# Patient Record
Sex: FEMALE | Race: WHITE | Employment: OTHER | URBAN - METROPOLITAN AREA
[De-identification: names, ages, dates, MRNs, and addresses within clinical notes are randomized per-mention and may not be internally consistent; named-entity substitution may affect disease eponyms.]

---

## 2017-06-19 ENCOUNTER — OFFICE VISIT (OUTPATIENT)
Dept: SURGERY | Age: 77
End: 2017-06-19

## 2017-06-19 VITALS
OXYGEN SATURATION: 97 % | HEIGHT: 61 IN | TEMPERATURE: 97.9 F | WEIGHT: 143.6 LBS | HEART RATE: 64 BPM | SYSTOLIC BLOOD PRESSURE: 145 MMHG | RESPIRATION RATE: 18 BRPM | BODY MASS INDEX: 27.11 KG/M2 | DIASTOLIC BLOOD PRESSURE: 96 MMHG

## 2017-06-19 DIAGNOSIS — K21.9 HIATAL HERNIA WITH GERD: Primary | ICD-10-CM

## 2017-06-19 DIAGNOSIS — K44.9 HIATAL HERNIA WITH GERD: Primary | ICD-10-CM

## 2017-06-19 NOTE — PROGRESS NOTES
Chief Complaint   Patient presents with    Abdominal Pain     hiatal hernia     Pt has hiatal hernia states that it has not given her any issues does have acid reflux. States her PCP wanted her to have it checked out. Pt states that she does experience some abd pain with meals.

## 2017-06-19 NOTE — MR AVS SNAPSHOT
Visit Information Date & Time Provider Department Dept. Phone Encounter #  
 6/19/2017  1:40 PM Vito Stephenson MD [facility] [id number] Upcoming Health Maintenance Date Due DTaP/Tdap/Td series (1 - Tdap) 7/26/1960 ZOSTER VACCINE AGE 60> 7/26/1999 GLAUCOMA SCREENING Q2Y 7/26/2004 OSTEOPOROSIS SCREENING (DEXA) 7/26/2004 Pneumococcal 65+ Low/Medium Risk (1 of 2 - PCV13) 7/26/2004 MEDICARE YEARLY EXAM 7/26/2004 INFLUENZA AGE 9 TO ADULT 8/1/2017 Allergies as of 6/19/2017  Review Complete On: 6/19/2017 By: Yancy Larry LPN No Known Allergies Current Immunizations  Never Reviewed No immunizations on file. Not reviewed this visit Vitals BP Pulse Temp Resp Height(growth percentile) Weight(growth percentile) (!) 145/96 64 97.9 °F (36.6 °C) (Oral) 18 5' 1\" (1.549 m) 143 lb 9.6 oz (65.1 kg) SpO2 BMI OB Status 97% 27.13 kg/m2 Postmenopausal     
  
BMI and BSA Data Body Mass Index Body Surface Area  
 27.13 kg/m 2 1.67 m 2 Preferred Pharmacy Pharmacy Name Phone [address], [address] [phone] Your Updated Medication List  
  
   
This list is accurate as of: 6/19/17  5:04 PM.  Always use your most recent med list.  
  
  
  
  
 aspirin 81 mg tablet Take  by mouth daily. atenolol-chlorthalidone 50-25 mg per tablet Commonly known as:  Olive Tom Take 1 Tab by mouth daily. benzocaine 20 % Kit  
by Mucous Membrane route. BIOTIN  
by Does Not Apply route daily. CITRACAL PO Take  by mouth two (2) times a day. EVISTA 60 mg tablet Generic drug:  raloxifene Take  by mouth daily. FIBERCON PO Take  by mouth two (2) times a day. FLUCONAZOLE (BULK)  
by Does Not Apply route. KLOR-CON M20 20 mEq tablet Generic drug:  potassium chloride Take 20 mEq by mouth two (2) times a day. LODRANE PO Take  by mouth daily. Indications: 2 pills once a day MAGTRATE PO Take  by mouth daily. metFORMIN 500 mg tablet Commonly known as:  GLUCOPHAGE Take 500 mg by mouth daily (with breakfast). NexIUM 40 mg capsule Generic drug:  esomeprazole Take 40 mg by mouth two (2) times a day. OTHER  
daily. Indications: fluticasone nasal spray OTHER(NON-FORMULARY)  
two (2) times a day. Indications: benzonotate VITAMIN D2 PO Take  by mouth two (2) times a day. Introducing [facility]! [facility] introduces [employer] patient portal. Now you can access parts of your medical record, email your doctor's office, and request medication refills online. 1. In your internet browser, go to [email or web address] 2. Click on the First Time User? Click Here link in the Sign In box. You will see the New Member Sign Up page. 3. Enter your [employer] Access Code exactly as it appears below. You will not need to use this code after youve completed the sign-up process. If you do not sign up before the expiration date, you must request a new code. · [employer] Access Code: [email or web address] Expires: 9/17/2017  1:48 PM 
 
4. Enter the last four digits of your Social Security Number (xxxx) and Date of Birth (mm/dd/yyyy) as indicated and click Submit. You will be taken to the next sign-up page. 5. Create a [employer] ID. This will be your [employer] login ID and cannot be changed, so think of one that is secure and easy to remember. 6. Create a [employer] password. You can change your password at any time. 7. Enter your Password Reset Question and Answer. This can be used at a later time if you forget your password. 8. Enter your e-mail address. You will receive e-mail notification when new information is available in [address] 9. Click Sign Up. You can now view and download portions of your medical record. 10. Click the Download Summary menu link to download a portable copy of your medical information. If you have questions, please visit the Frequently Asked Questions section of the [employer] website. Remember, [employer] is NOT to be used for urgent needs. For medical emergencies, dial 911. Now available from your iPhone and Android! Please provide this summary of care documentation to your next provider. Your primary care clinician is listed as Venancio Reynolds. If you have any questions after today's visit, please call [phone].

## 2017-06-20 NOTE — PROGRESS NOTES
Surgery Consult:  Hiatal hernia  Requesting physician:  Dr. Isabella Ozuna    Subjective:   Patient 67 y.o.  female presents for surgical evaluation of hiatal hernia. Patient has been having intermittent post-prandial bloating and epigastric pain with dyspepsia. Patient also complains of intermittent dyspnea with activity and sometimes post-prandial.  Patient also reports occasional solid dysphagia. No nausea or vomiting. No change in bowel habits. No diarrhea or constipation. No chronic cough. No history of aspiration pneumonia or hoarseness. Patient with chronic history of GERD and has been on Nexium for 20 years. Symptoms are well controlled with Nexium. Patient had UGI on 7/31/13 and it showed large sliding type hiatal hernia with proximal half of the stomach inverted above the diaphragm; no reflux. Last EGD was on 6/18/13 and patient noted to have large hiatal hernia with erosions Dusty Sandhoff lesion) and esophageal stricture which was dilated. Esophageal manometry has not been done yet. Past Medical & Surgical History:  Past Medical History:   Diagnosis Date    Anemia 6/18/2013    Cancer ([address]     CLL    Epigastric pain 6/18/2013    Family history of colon cancer 6/18/2013    sister    GERD (gastroesophageal reflux disease)     Hypertension       Past Surgical History:   Procedure Laterality Date    HX CHOLECYSTECTOMY      HX KNEE ARTHROSCOPY      LEFT    HX VASCULAR ACCESS      SERGE CATH LL CHEST       Social History:  Social History     Social History    Marital status:      Spouse name: N/A    Number of children: N/A    Years of education: N/A     Occupational History    Not on file.      Social History Main Topics    Smoking status: Never Smoker    Smokeless tobacco: Not on file    Alcohol use No    Drug use: No    Sexual activity: Not on file     Other Topics Concern    Not on file     Social History Narrative        Family History:  Family History   Problem Relation Age of Onset    Heart Disease Mother     Arthritis-rheumatoid Mother         Medications:  Current Outpatient Prescriptions   Medication Sig    metFORMIN (GLUCOPHAGE) 500 mg tablet Take 500 mg by mouth daily (with breakfast).  potassium chloride (KLOR-CON M20) 20 mEq tablet Take 20 mEq by mouth two (2) times a day.  atenolol-chlorthalidone (TENORETIC) 50-25 mg per tablet Take 1 Tab by mouth daily.  esomeprazole (NEXIUM) 40 mg capsule Take 40 mg by mouth two (2) times a day.  CALCIUM POLYCARBOPHIL (FIBERCON PO) Take  by mouth two (2) times a day.  CALCIUM CITRATE (CITRACAL PO) Take  by mouth two (2) times a day.  ERGOCALCIFEROL (VITAMIN D PO) Take  by mouth two (2) times a day.  benzocaine 20 % Kit by Mucous Membrane route.  OTHER,NON-FORMULARY, two (2) times a day. Indications: benzonotate    FLUCONAZOLE, BULK, by Does Not Apply route.  OTHER daily. Indications: fluticasone nasal spray    MAGNESIUM GLUCONATE (MAGTRATE PO) Take  by mouth daily.  P-EPHED HCL/BROMPHENIRAMIN (LODRANE PO) Take  by mouth daily. Indications: 2 pills once a day    aspirin 81 mg tablet Take  by mouth daily.  raloxifene (EVISTA) 60 mg tablet Take  by mouth daily.  BIOTIN by Does Not Apply route daily. No current facility-administered medications for this visit. Allergies:  No Known Allergies    Review of Systems  A comprehensive review of systems was negative except for that written in the HPI. Objective:     Exam:    Visit Vitals    BP (!) 145/96    Pulse 64    Temp 97.9 °F (36.6 °C) (Oral)    Resp 18    Ht 5' 1\" (1.549 m)    Wt 65.1 kg (143 lb 9.6 oz)    SpO2 97%    BMI 27.13 kg/m2     General appearance: alert, cooperative, no distress, appears stated age  Eyes: negative  Lungs: clear to auscultation bilaterally  Heart: regular rate and rhythm  Abdomen: soft, non-distended. Mild epigastric tenderness. No rebound or guarding.   Extremities: extremities normal, atraumatic, no cyanosis or edema. CATINA. Skin: Skin color, texture, turgor normal. No rashes or lesions. Neurologic: Grossly normal      Assessment:     Symptomatic hiatal hernia  Hx of Baljinder ulcer  GERD    Plan:     Recommend Davinci hiatal hernia repair with fundoplication   Risks, benefit, and alternative to surgery was discussed with the patient. The risks of surgery include but not limited to infection, bleeding, intraabdominal organ injury, vague nerve injury, recurrence, dysphagia following surgery, possible conversion to open, and the risks of general anesthetic. At this time, patient would like to hold off on surgery. All questions answered. Instructed to continue PPI for now. If patient decides to go ahead with surgery at later time, will need esophageal manometry.

## 2018-07-21 ENCOUNTER — HOSPITAL ENCOUNTER (EMERGENCY)
Age: 78
Discharge: HOME | End: 2018-07-21
Attending: EMERGENCY MEDICINE
Payer: MEDICARE

## 2018-07-21 ENCOUNTER — APPOINTMENT (OUTPATIENT)
Dept: CT IMAGING | Age: 78
End: 2018-07-21
Attending: EMERGENCY MEDICINE
Payer: MEDICARE

## 2018-07-21 VITALS
RESPIRATION RATE: 16 BRPM | WEIGHT: 138.45 LBS | HEART RATE: 53 BPM | OXYGEN SATURATION: 95 % | SYSTOLIC BLOOD PRESSURE: 202 MMHG | TEMPERATURE: 98.2 F | HEIGHT: 61 IN | DIASTOLIC BLOOD PRESSURE: 85 MMHG | BODY MASS INDEX: 26.14 KG/M2

## 2018-07-21 DIAGNOSIS — R10.13 ABDOMINAL PAIN, EPIGASTRIC: Primary | ICD-10-CM

## 2018-07-21 DIAGNOSIS — R10.13 EPIGASTRIC PAIN: ICD-10-CM

## 2018-07-21 DIAGNOSIS — K44.9 HIATAL HERNIA: ICD-10-CM

## 2018-07-21 PROCEDURE — 96375 TX/PRO/DX INJ NEW DRUG ADDON: CPT

## 2018-07-21 PROCEDURE — 96374 THER/PROPH/DIAG INJ IV PUSH: CPT

## 2018-07-21 PROCEDURE — 99284 EMERGENCY DEPT VISIT MOD MDM: CPT

## 2018-07-21 PROCEDURE — 74011636320 HC RX REV CODE- 636/320: Performed by: EMERGENCY MEDICINE

## 2018-07-21 PROCEDURE — 74011250636 HC RX REV CODE- 250/636: Performed by: EMERGENCY MEDICINE

## 2018-07-21 PROCEDURE — 74177 CT ABD & PELVIS W/CONTRAST: CPT

## 2018-07-21 RX ORDER — SODIUM CHLORIDE 9 MG/ML
50 INJECTION, SOLUTION INTRAVENOUS
Status: COMPLETED | OUTPATIENT
Start: 2018-07-21 | End: 2018-07-21

## 2018-07-21 RX ORDER — ONDANSETRON 2 MG/ML
4 INJECTION INTRAMUSCULAR; INTRAVENOUS
Status: COMPLETED | OUTPATIENT
Start: 2018-07-21 | End: 2018-07-21

## 2018-07-21 RX ORDER — MORPHINE SULFATE 4 MG/ML
4 INJECTION INTRAVENOUS
Status: COMPLETED | OUTPATIENT
Start: 2018-07-21 | End: 2018-07-21

## 2018-07-21 RX ORDER — METRONIDAZOLE 500 MG/1
500 TABLET ORAL 3 TIMES DAILY
Qty: 30 TAB | Refills: 0 | Status: ON HOLD | OUTPATIENT
Start: 2018-07-21 | End: 2018-07-26

## 2018-07-21 RX ORDER — SODIUM CHLORIDE 0.9 % (FLUSH) 0.9 %
10 SYRINGE (ML) INJECTION
Status: COMPLETED | OUTPATIENT
Start: 2018-07-21 | End: 2018-07-21

## 2018-07-21 RX ORDER — CIPROFLOXACIN 500 MG/1
500 TABLET ORAL 2 TIMES DAILY
Qty: 20 TAB | Refills: 0 | Status: ON HOLD | OUTPATIENT
Start: 2018-07-21 | End: 2018-07-26

## 2018-07-21 RX ORDER — HYDROCODONE BITARTRATE AND ACETAMINOPHEN 7.5; 325 MG/1; MG/1
1 TABLET ORAL
Qty: 10 TAB | Refills: 0 | Status: ON HOLD | OUTPATIENT
Start: 2018-07-21 | End: 2018-07-26

## 2018-07-21 RX ADMIN — ONDANSETRON 4 MG: 2 INJECTION INTRAMUSCULAR; INTRAVENOUS at 12:35

## 2018-07-21 RX ADMIN — Medication 10 ML: at 10:59

## 2018-07-21 RX ADMIN — IOPAMIDOL 100 ML: 755 INJECTION, SOLUTION INTRAVENOUS at 10:59

## 2018-07-21 RX ADMIN — SODIUM CHLORIDE 50 ML/HR: 900 INJECTION, SOLUTION INTRAVENOUS at 10:59

## 2018-07-21 RX ADMIN — MORPHINE SULFATE 4 MG: 4 INJECTION INTRAVENOUS at 12:36

## 2018-07-21 NOTE — ED NOTES
Bedside shift change report given to this RN (oncoming nurse) by Rosalio Schumacher RN (offgoing nurse). Report included the following information SBAR and ED Summary.

## 2018-07-21 NOTE — DISCHARGE INSTRUCTIONS
Hiatal Hernia: Care Instructions  Your Care Instructions  A hiatal hernia occurs when part of the stomach bulges into the chest cavity. A hiatal hernia may allow stomach acid and juices to back up into the esophagus (acid reflux). This can cause a feeling of burning, warmth, heat, or pain behind the breastbone. This feeling may often occur after you eat, soon after you lie down, or when you bend forward, and it may come and go. You also may have a sour taste in your mouth. These symptoms are commonly known as heartburn or reflux. But not all hiatal hernias cause symptoms. Follow-up care is a key part of your treatment and safety. Be sure to make and go to all appointments, and call your doctor if you are having problems. It's also a good idea to know your test results and keep a list of the medicines you take. How can you care for yourself at home? · Take your medicines exactly as prescribed. Call your doctor if you think you are having a problem with your medicine. · Do not take aspirin or other nonsteroidal anti-inflammatory drugs (NSAIDs), such as ibuprofen (Advil, Motrin) or naproxen (Aleve), unless your doctor says it is okay. Ask your doctor what you can take for pain. · Your doctor may recommend over-the-counter medicine. For mild or occasional indigestion, antacids such as Tums, Gaviscon, Maalox, or Mylanta may help. Your doctor also may recommend over-the-counter acid reducers, such as famotidine (Pepcid AC), cimetidine (Tagamet HB), ranitidine (Zantac 75 and Zantac 150), or omeprazole (Prilosec). Read and follow all instructions on the label. If you use these medicines often, talk with your doctor. · Change your eating habits. ¨ It's best to eat several small meals instead of two or three large meals. ¨ After you eat, wait 2 to 3 hours before you lie down. Late-night snacks aren't a good idea. ¨ Chocolate, mint, and alcohol can make heartburn worse.  They relax the valve between the esophagus and the stomach. ¨ Spicy foods, foods that have a lot of acid (like tomatoes and oranges), and coffee can make heartburn symptoms worse in some people. If your symptoms are worse after you eat a certain food, you may want to stop eating that food to see if your symptoms get better. · Do not smoke or chew tobacco.  · If you get heartburn at night, raise the head of your bed 6 to 8 inches by putting the frame on blocks or placing a foam wedge under the head of your mattress. (Adding extra pillows does not work.)  · Do not wear tight clothing around your middle. · Lose weight if you need to. Losing just 5 to 10 pounds can help. When should you call for help? Call your doctor now or seek immediate medical care if:    · You have new or worse belly pain.     · You are vomiting.    Watch closely for changes in your health, and be sure to contact your doctor if:    · You have new or worse symptoms of indigestion.     · You have trouble or pain swallowing.     · You are losing weight.     · You do not get better as expected. Where can you learn more? Go to http://kiarra-wendy.info/. Enter Z090 in the search box to learn more about \"Hiatal Hernia: Care Instructions. \"  Current as of: May 12, 2017  Content Version: 11.7  © 2390-7306 [employer]. Care instructions adapted under license by [employer] (which disclaims liability or warranty for this information). If you have questions about a medical condition or this instruction, always ask your healthcare professional. [place] [address] any warranty or liability for your use of this information.

## 2018-07-21 NOTE — ED NOTES
Dr. Katelynn Mi has reviewed discharge instructions with the patient. The patient verbalized understanding. Pt discharged home via wheelchair with family, discharge papers/prescriptions in hand.

## 2018-07-21 NOTE — CONSULTS
Surgery        Asked to see patient regarding epigastric pain and a CT scan showing: \"IMPRESSION:     1. Extremely large hiatal hernia, containing the entire fundus and part of the  gastric body. The size of this hernia has increased since the prior study in  2009. This degree of gastric herniation introduces risk of gastric volvulus. Correlate with clinical symptoms. 2. Mild hepatic steatosis. 3. Severe diverticulosis without diverticulitis confirmed, although there is  minimal fluid around the sigmoid colon. Correlate with clinical symptoms and  follow-up as appropriate. \"    WBC 9.6    VSS in fact patient is hypertensive with Sys BP greater than 200    Reports epigastric pain radiating around abd and into chest.  Has now been medicated for this    PE A&Ox3 NAD        Nl resp effort        RRR hypertensive        Abd soft,+BS, tender upper abdomen, no guarding or rebound    A/P no acute process of etiology for complaints identified. ? Related to large hiatal hernia which have corrected  Suggest F/U with Surgery: Dr Kimberley Peterson or Lula Kerr for [facility] hernia evaluation and possible robotic assisted repair. On Nexium    Diverticulosis, ?low grade diverticulitis  May be reasonable to treat with antibx    HTN. (has not taken meds today.)    Pain control and PO challenge before discharge. John Palacios MD, [facility]

## 2018-07-21 NOTE — ED PROVIDER NOTES
EMERGENCY DEPARTMENT HISTORY AND PHYSICAL EXAM 
 
 
Date: 7/21/2018 Patient Name: Kevin Dwyer History of Presenting Illness Chief Complaint Patient presents with  Abdominal Pain Patient states that she was referred from [facility] for a possible SBO History Provided By: Patient HPI: Kevin Dwyer, 65 y.o. female with PMHx significant for hiatal hernia, HTN, GERD, anemia, and s/p cholecystectomy presents ambulatory to the ED with cc of constant mid to upper abdominal pain that is now radiating to her back since 5 AM today that she rates a 7/10. Pt also reports some mild associated nausea. Her last BM was yesterday. Pt was seen at [facility] and referred to the ED for an XR due to her hiatal hernia. There are no other complaints, changes, or physical findings at this time. PCP: Keyana Cleveland MD 
 
Current Outpatient Prescriptions Medication Sig Dispense Refill  
 HYDROcodone-acetaminophen (LORTAB 7.5-325) 7.5-325 mg per tablet Take 1 Tab by mouth every eight (8) hours as needed for Pain. Max Daily Amount: 3 Tabs. Indications: Pain 10 Tab 0  
 metFORMIN (GLUCOPHAGE) 500 mg tablet Take 500 mg by mouth daily (with breakfast).  potassium chloride (KLOR-CON M20) 20 mEq tablet Take 20 mEq by mouth two (2) times a day.  atenolol-chlorthalidone (TENORETIC) 50-25 mg per tablet Take 1 Tab by mouth daily.  esomeprazole (NEXIUM) 40 mg capsule Take 40 mg by mouth two (2) times a day.  CALCIUM POLYCARBOPHIL (FIBERCON PO) Take  by mouth two (2) times a day.  CALCIUM CITRATE (CITRACAL PO) Take  by mouth two (2) times a day.  ERGOCALCIFEROL (VITAMIN D PO) Take  by mouth two (2) times a day.  benzocaine 20 % Kit by Mucous Membrane route.  OTHER,NON-FORMULARY, two (2) times a day. Indications: benzonotate  FLUCONAZOLE, BULK, by Does Not Apply route.  OTHER daily. Indications: fluticasone nasal spray  MAGNESIUM GLUCONATE (MAGTRATE PO) Take  by mouth daily.  P-EPHED HCL/BROMPHENIRAMIN (LODRANE PO) Take  by mouth daily. Indications: 2 pills once a day  aspirin 81 mg tablet Take  by mouth daily.  raloxifene (EVISTA) 60 mg tablet Take  by mouth daily.  BIOTIN by Does Not Apply route daily. Past History Past Medical History: 
Past Medical History:  
Diagnosis Date  Anemia 6/18/2013  Cancer ([address] CLL  Epigastric pain 6/18/2013  Family history of colon cancer 6/18/2013  
 sister  GERD (gastroesophageal reflux disease)  Hypertension Past Surgical History: 
Past Surgical History:  
Procedure Laterality Date  HX CHOLECYSTECTOMY  HX KNEE ARTHROSCOPY    
 LEFT  
 HX VASCULAR ACCESS SERGE CATH LL CHEST Family History: 
Family History Problem Relation Age of Onset  Heart Disease Mother  Arthritis-rheumatoid Mother Social History: 
Social History Substance Use Topics  Smoking status: Never Smoker  Smokeless tobacco: None  Alcohol use No  
 
 
Allergies: 
No Known Allergies Review of Systems Review of Systems Constitutional: Negative. Negative for activity change, appetite change, chills, fatigue, fever and unexpected weight change. HENT: Negative. Negative for congestion, hearing loss, rhinorrhea, sneezing and voice change. Eyes: Negative. Negative for pain and visual disturbance. Respiratory: Negative. Negative for apnea, cough, choking, chest tightness and shortness of breath. Cardiovascular: Negative. Negative for chest pain and palpitations. Gastrointestinal: Positive for abdominal pain and nausea. Negative for abdominal distention, blood in stool, diarrhea and vomiting. Genitourinary: Negative. Negative for difficulty urinating, flank pain, frequency and urgency. No discharge Musculoskeletal: Negative. Negative for arthralgias, back pain, myalgias and neck stiffness. Skin: Negative.   Negative for color change and rash. Neurological: Negative. Negative for dizziness, seizures, syncope, speech difficulty, weakness, numbness and headaches. Hematological: Negative for adenopathy. Psychiatric/Behavioral: Negative. Negative for agitation, behavioral problems, dysphoric mood and suicidal ideas. The patient is not nervous/anxious. Physical Exam  
Physical Exam  
Constitutional: She is oriented to person, place, and time. She appears well-developed and well-nourished. No distress. HENT:  
Head: Normocephalic and atraumatic. Mouth/Throat: Oropharynx is clear and moist. No oropharyngeal exudate. Eyes: Conjunctivae and EOM are normal. Pupils are equal, round, and reactive to light. Right eye exhibits no discharge. Left eye exhibits no discharge. Neck: Normal range of motion. Neck supple. Cardiovascular: Normal rate, regular rhythm and intact distal pulses. Exam reveals no gallop and no friction rub. No murmur heard. Pulmonary/Chest: Effort normal and breath sounds normal. No respiratory distress. She has no wheezes. She has no rales. She exhibits no tenderness. Abdominal: Soft. Bowel sounds are normal. She exhibits no distension and no mass. There is tenderness (mild) in the epigastric area. There is no rebound and no guarding. Musculoskeletal: Normal range of motion. She exhibits no edema. Lymphadenopathy:  
  She has no cervical adenopathy. Neurological: She is alert and oriented to person, place, and time. No cranial nerve deficit. Coordination normal.  
Skin: Skin is warm and dry. No rash noted. No erythema. Psychiatric: She has a normal mood and affect. Nursing note and vitals reviewed. Diagnostic Study Results Radiologic Studies -  
 
CT Results  (Last 48 hours) 07/21/18 1117  CT ABD PELV W CONT Final result Impression:  IMPRESSION:  
   
1. Extremely large hiatal hernia, containing the entire fundus and part of the  
gastric body.  The size of this hernia has increased since the prior study in  
2009. This degree of gastric herniation introduces risk of gastric volvulus. Correlate with clinical symptoms. 2. Mild hepatic steatosis. 3. Severe diverticulosis without diverticulitis confirmed, although there is  
minimal fluid around the sigmoid colon. Correlate with clinical symptoms and  
follow-up as appropriate. 4. Other incidental and postoperative changes. Narrative:  EXAM:  CT ABD PELV W CONT INDICATION: Abdominal pain  , extending across the upper abdomen and radiating  
to the back. History of cholecystectomy in the past. History of chronic  
lymphocytic leukemia. COMPARISON: 8/21/2009. CONTRAST:  100 mL of Isovue-370. TECHNIQUE:   
Following the uneventful intravenous administration of contrast, thin axial  
images were obtained through the abdomen and pelvis. Coronal and sagittal  
reconstructions were generated. Oral contrast was not administered. CT dose  
reduction was achieved through use of a standardized protocol tailored for this  
examination and automatic exposure control for dose modulation. FINDINGS:   
LUNG BASES: Large hiatal hernia has increased in size since the prior study,  
containing the entire fundus and part of the gastric body. Redundancy of the  
gastric wall is thought to be on that basis. INCIDENTALLY IMAGED HEART AND MEDIASTINUM: Unremarkable. LIVER: No mass or biliary dilatation. The liver is mildly hypodense. GALLBLADDER: Surgically absent. SPLEEN: No mass. PANCREAS: No mass or ductal dilatation. ADRENALS: Unremarkable. KIDNEYS: No mass, calculus, or hydronephrosis. Tiny hypodensity in the left  
renal cortex too small to characterize but likely representing a cyst. Prominent  
right extrarenal pelvis without obstruction confirmed. STOMACH: Unremarkable. SMALL BOWEL: No dilatation or wall thickening. COLON: No dilatation or wall thickening.  Innumerable diverticula most severe in  
the sigmoid colon, where there is pericolonic fluid but no definite wall  
thickening or inflammation of the colon is confirmed. APPENDIX: Not seen, but no acute appendiceal abnormality is suspected. PERITONEUM: Minimal free fluid in the pelvis surrounding the sigmoid colon RETROPERITONEUM: No lymphadenopathy or aortic aneurysm. REPRODUCTIVE ORGANS: The uterus contains calcifications suggesting degenerating  
leiomyoma. URINARY BLADDER: No mass or calculus. BONES: No destructive bone lesion. Lumbosacral degenerative disc disease with 6  
mm anterolisthesis at L4-5. Generalized mild osteopenia. ADDITIONAL COMMENTS: N/A Medical Decision Making I am the first provider for this patient. I reviewed the vital signs, available nursing notes, past medical history, past surgical history, family history and social history. Vital Signs-Reviewed the patient's vital signs. Patient Vitals for the past 12 hrs: 
 Temp Pulse Resp BP SpO2  
07/21/18 1315 - - - (!) 202/85 95 %  
07/21/18 1300 - - - 190/90 96 %  
07/21/18 1245 - - - 172/87 (!) 89 %  
07/21/18 1230 - - - (!) 218/167 96 %  
07/21/18 1215 - - - (!) 212/101 95 %  
07/21/18 1200 - - - (!) 202/112 96 %  
07/21/18 1154 - - - (!) 228/82 -  
07/21/18 1130 - - - (!) 223/86 95 %  
07/21/18 1115 - - - (!) 222/74 -  
07/21/18 1045 - - - (!) 213/84 98 %  
07/21/18 1030 - - - (!) 218/85 98 %  
07/21/18 1024 - - - (!) 210/114 98 %  
07/21/18 1017 - (!) 53 16 (!) 228/89 98 %  
07/21/18 1009 98.2 °F (36.8 °C) - - - -  
 
 
Pulse Oximetry Analysis - 99% on room air Cardiac Monitor:  
Rate: 52 bpm 
Rhythm: Sinus Bradycardia 228/89 Records Reviewed: Nursing Notes, Old Medical Records, Previous Radiology Studies and Previous Laboratory Studies Provider Notes (Medical Decision Making): DDx: hiatal hernia, bowel obstruction, gastritis ED Course:  
Initial assessment performed.  The patients presenting problems have been discussed, and they are in agreement with the care plan formulated and outlined with them. I have encouraged them to ask questions as they arise throughout their visit. 10:30 AM 
Reviewed pt's labs and imaging from [facility].  
 
CONSULT NOTE: 
12:30 PM 
Chirag Mercedes MD spoke with Jose M Su MD 
Specialty: General Surgery Discussed patient's hx, disposition, and available diagnostic and imaging results. Reviewed care plans. Consultant agrees with plans as outlined. He will evaluate the pt. Disposition: 
DISCHARGE NOTE 
2:00 PM 
The patient has been re-evaluated and is ready for discharge. Reviewed available results with patient. Counseled patient on diagnosis and care plan. Patient has expressed understanding, and all questions have been answered. Patient agrees with plan and agrees to follow up as recommended, or return to the ED if their symptoms worsen. Discharge instructions have been provided and explained to the patient, along with reasons to return to the ED. PLAN: 
1. Current Discharge Medication List  
  
START taking these medications Details HYDROcodone-acetaminophen (LORTAB 7.5-325) 7.5-325 mg per tablet Take 1 Tab by mouth every eight (8) hours as needed for Pain. Max Daily Amount: 3 Tabs. Indications: Pain 
Qty: 10 Tab, Refills: 0 Associated Diagnoses: Hiatal hernia 2. Follow-up Information Follow up With Details Comments Contact Info Andrés Olvera MD Call in 2 days As needed, If symptoms worsen [address] [facility] 
[phone] Marquis Kincaid MD Call in 2 days  [address] 
[phone] Return to ED if worse Diagnosis Clinical Impression: 1. Abdominal pain, epigastric 2. Hiatal hernia 3. Epigastric pain Attestations: This note is prepared by Satinder Solo, acting as Sc[facility]. Mary Cruz 78 Darlene Shaikh MD: The scribe's documentation has been prepared under my direction and personally reviewed by me in its entirety. I confirm that the note above accurately reflects all work, treatment, procedures, and medical decision making performed by me.

## 2018-07-24 ENCOUNTER — OFFICE VISIT (OUTPATIENT)
Dept: SURGERY | Age: 78
End: 2018-07-24

## 2018-07-24 ENCOUNTER — TELEPHONE (OUTPATIENT)
Dept: SURGERY | Age: 78
End: 2018-07-24

## 2018-07-24 VITALS
TEMPERATURE: 97.9 F | HEIGHT: 61 IN | DIASTOLIC BLOOD PRESSURE: 82 MMHG | BODY MASS INDEX: 25.86 KG/M2 | HEART RATE: 78 BPM | OXYGEN SATURATION: 96 % | SYSTOLIC BLOOD PRESSURE: 155 MMHG | WEIGHT: 137 LBS

## 2018-07-24 DIAGNOSIS — K21.9 HIATAL HERNIA WITH GERD: Primary | ICD-10-CM

## 2018-07-24 DIAGNOSIS — K44.9 HIATAL HERNIA WITH GERD: Primary | ICD-10-CM

## 2018-07-24 NOTE — MR AVS SNAPSHOT
[address], [address] [facility], [address] 
[phone] Patient: Cosme Rey MRN: [id number] Visit Information Date & Time Provider Department Dept. Phone Encounter #  
 2018  9:40 AM Lisa Estrada MD [facility] [address] [id number] Your Appointments 2018  2:20 PM  
POST OP with Lisa Estrada MD  
[facility] Dr. Laci Rebolledo [facility] ([address]) Appt Note: po-Paul paraesophageal hernia  18  
 [address], [address] [facility], [address] [phone]  
[address] [facility], [address] [facility] [address] [phone] Upcoming Health Maintenance Date Due DTaP/Tdap/Td series (1 - Tdap) 1960 ZOSTER VACCINE AGE 60> 1999 GLAUCOMA SCREENING Q2Y 2004 Bone Densitometry (Dexa) Screening 2004 Pneumococcal 65+ Low/Medium Risk (1 of 2 - PCV13) 2004 MEDICARE YEARLY EXAM 3/14/2018 Influenza Age 5 to Adult 2018 Allergies as of 2018  Review Complete On: 2018 By: Leila Rooney No Known Allergies Current Immunizations  Never Reviewed No immunizations on file. Not reviewed this visit Vitals BP Pulse Temp Height(growth percentile) Weight(growth percentile) SpO2  
 155/82 (BP 1 Location: Left arm, BP Patient Position: Sitting) 78 97.9 °F (36.6 °C) (Oral) 5' 1\" (1.549 m) 137 lb (62.1 kg) 96% BMI OB Status Smoking Status 25.89 kg/m2 Postmenopausal Never Smoker BMI and BSA Data Body Mass Index Body Surface Area  
 25.89 kg/m 2 1.63 m 2 Preferred Pharmacy Pharmacy Name Phone [address], [address] [phone] Your Updated Medication List  
  
   
This list is accurate as of 18 11:14 AM.  Always use your most recent med list.  
  
  
  
  
 aspirin 81 mg tablet Take  by mouth daily. atenolol-chlorthalidone 50-25 mg per tablet Commonly known as:  Morales Peg Take 1 Tab by mouth daily. benzocaine 20 % Kit  
by Mucous Membrane route. BIOTIN  
by Does Not Apply route daily. ciprofloxacin HCl 500 mg tablet Commonly known as:  CIPRO Take 1 Tab by mouth two (2) times a day for 10 days. CITRACAL PO Take  by mouth two (2) times a day. EVISTA 60 mg tablet Generic drug:  raloxifene Take  by mouth daily. FIBERCON PO Take  by mouth two (2) times a day. FLUCONAZOLE (BULK)  
by Does Not Apply route. HYDROcodone-acetaminophen 7.5-325 mg per tablet Commonly known as:  LORTAB 7.5-325 Take 1 Tab by mouth every eight (8) hours as needed for Pain. Max Daily Amount: 3 Tabs. Indications: Pain KLOR-CON M20 20 mEq tablet Generic drug:  potassium chloride Take 20 mEq by mouth two (2) times a day. LODRANE PO Take  by mouth daily. Indications: 2 pills once a day MAGTRATE PO Take  by mouth daily. metFORMIN 500 mg tablet Commonly known as:  GLUCOPHAGE Take 500 mg by mouth daily (with breakfast). metroNIDAZOLE 500 mg tablet Commonly known as:  FLAGYL Take 1 Tab by mouth three (3) times daily for 10 days. NexIUM 40 mg capsule Generic drug:  esomeprazole Take 40 mg by mouth two (2) times a day. OTHER  
daily. Indications: fluticasone nasal spray OTHER(NON-FORMULARY)  
two (2) times a day. Indications: benzonotate VITAMIN D2 PO Take  by mouth two (2) times a day. To-Do List   
 07/31/2018 10:00 AM  
  Appointment with DEANNA LYON ROOM P3 at [address] ([phone]) Introducing [facility]! [employer] introduces Board[employer] patient portal. Now you can access parts of your medical record, email your doctor's office, and request medication refills online.    
 
1. In your internet browser, go to [email or web address] 2. Click on the First Time User? Click Here link in the Sign In box. You will see the New Member Sign Up page. 3. Enter your Fairlay Access Code exactly as it appears below. You will not need to use this code after youve completed the sign-up process. If you do not sign up before the expiration date, you must request a new code. · Fairlay Access Code: [email or web address] Expires: 10/19/2018 10:10 AM 
 
4. Enter the last four digits of your Social Security Number (xxxx) and Date of Birth (mm/dd/yyyy) as indicated and click Submit. You will be taken to the next sign-up page. 5. Create a Ferevot ID. This will be your Fairlay login ID and cannot be changed, so think of one that is secure and easy to remember. 6. Create a Fairlay password. You can change your password at any time. 7. Enter your Password Reset Question and Answer. This can be used at a later time if you forget your password. 8. Enter your e-mail address. You will receive e-mail notification when new information is available in [address] 9. Click Sign Up. You can now view and download portions of your medical record. 10. Click the Download Summary menu link to download a portable copy of your medical information. If you have questions, please visit the Frequently Asked Questions section of the Fairlay website. Remember, Fairlay is NOT to be used for urgent needs. For medical emergencies, dial 911. Now available from your iPhone and Android! Please provide this summary of care documentation to your next provider. Your primary care clinician is listed as Galen Martines. If you have any questions after today's visit, please call [phone].

## 2018-07-24 NOTE — TELEPHONE ENCOUNTER
Returned call to daughter of patient Instructed for patient to continue the antibiotics until complete.

## 2018-07-24 NOTE — PROGRESS NOTES
Richa Oh is a 65 y.o. female     Chief Complaint   Patient presents with    Hiatal Hernia     eval of hernia ref by ED [facility]       Visit Vitals    /82 (BP 1 Location: Left arm, BP Patient Position: Sitting)    Pulse 78    Temp 97.9 °F (36.6 °C) (Oral)    Ht 5' 1\" (1.549 m)    Wt 137 lb (62.1 kg)    SpO2 96%    BMI 25.89 kg/m2       Health Maintenance Due   Topic Date Due    DTaP/Tdap/Td series (1 - Tdap) 07/26/1960    ZOSTER VACCINE AGE 60>  05/26/1999    GLAUCOMA SCREENING Q2Y  07/26/2004    Bone Densitometry (Dexa) Screening  07/26/2004    Pneumococcal 65+ Low/Medium Risk (1 of 2 - PCV13) 07/26/2004    MEDICARE YEARLY EXAM  03/14/2018       1. Have you been to the ER, urgent care clinic since your last visit? Hospitalized since your last visit? No    2. Have you seen or consulted any other health care providers outside of the [address] [place] [address] since your last visit? Include any pap smears or colon screening.  No

## 2018-07-24 NOTE — TELEPHONE ENCOUNTER
Patient's daughter would like to know if her mother needs to continue the antibiotic that was prescribed by the er.

## 2018-07-25 NOTE — PROGRESS NOTES
Surgery H&P    Subjective:   Patient 65 y.o.  female was seen in my office on 6/19/17 for symptomatic hiatal hernia. Possible Davinci hiatal hernia repair was discussed but decided to hold off. However, patient's symptoms are getting worse. Patient was seen in ER on 7/21/18 with upper abdominal pain radiating to the back with nausea. No emesis. CT scan in the ER showed extremely large hiatal hernia containing entire fundus and part of the gastric body. The size of the hernia has increased since the last study. This degree of gastric herniation introduces risk of gastric volvulus. Mild hepatic steatosis. Severe diverticulosis without diverticulitis. Patient has been having intermittent post-prandial bloating and epigastric pain with dyspepsia. Patient also complains of intermittent dyspnea with activity and sometimes post-prandial.  Patient also reports occasional solid dysphagia. No nausea or vomiting. No change in bowel habits. No diarrhea or constipation. No chronic cough. No history of aspiration pneumonia or hoarseness. Patient with chronic history of GERD and has been on Nexium for 20 years. Symptoms are well controlled with Nexium. Esophageal manometry has not been done yet. Past Medical & Surgical History:  Past Medical History:   Diagnosis Date    Anemia 6/18/2013    Cancer ([address]     CLL    Epigastric pain 6/18/2013    Family history of colon cancer 6/18/2013    sister    GERD (gastroesophageal reflux disease)     Hypertension       Past Surgical History:   Procedure Laterality Date    HX CHOLECYSTECTOMY      HX KNEE ARTHROSCOPY      LEFT    HX VASCULAR ACCESS      SERGE CATH LL CHEST       Social History:  Social History     Social History    Marital status:      Spouse name: N/A    Number of children: N/A    Years of education: N/A     Occupational History    Not on file.      Social History Main Topics    Smoking status: Never Smoker    Smokeless tobacco: Never Used    Alcohol use No    Drug use: No    Sexual activity: Not on file     Other Topics Concern    Not on file     Social History Narrative        Family History:  Family History   Problem Relation Age of Onset    Heart Disease Mother     Arthritis-rheumatoid Mother         Medications:  Current Outpatient Prescriptions   Medication Sig    HYDROcodone-acetaminophen (LORTAB 7.5-325) 7.5-325 mg per tablet Take 1 Tab by mouth every eight (8) hours as needed for Pain. Max Daily Amount: 3 Tabs. Indications: Pain    ciprofloxacin HCl (CIPRO) 500 mg tablet Take 1 Tab by mouth two (2) times a day for 10 days.  metroNIDAZOLE (FLAGYL) 500 mg tablet Take 1 Tab by mouth three (3) times daily for 10 days.  metFORMIN (GLUCOPHAGE) 500 mg tablet Take 500 mg by mouth daily (with breakfast).  potassium chloride (KLOR-CON M20) 20 mEq tablet Take 20 mEq by mouth two (2) times a day.  atenolol-chlorthalidone (TENORETIC) 50-25 mg per tablet Take 1 Tab by mouth daily.  esomeprazole (NEXIUM) 40 mg capsule Take 40 mg by mouth two (2) times a day.  CALCIUM POLYCARBOPHIL (FIBERCON PO) Take  by mouth two (2) times a day.  CALCIUM CITRATE (CITRACAL PO) Take  by mouth two (2) times a day.  ERGOCALCIFEROL (VITAMIN D PO) Take  by mouth two (2) times a day.  benzocaine 20 % Kit by Mucous Membrane route.  OTHER,NON-FORMULARY, two (2) times a day. Indications: benzonotate    FLUCONAZOLE, BULK, by Does Not Apply route.  OTHER daily. Indications: fluticasone nasal spray    MAGNESIUM GLUCONATE (MAGTRATE PO) Take  by mouth daily.  P-EPHED HCL/BROMPHENIRAMIN (LODRANE PO) Take  by mouth daily. Indications: 2 pills once a day    aspirin 81 mg tablet Take  by mouth daily.  raloxifene (EVISTA) 60 mg tablet Take  by mouth daily.  BIOTIN by Does Not Apply route daily. No current facility-administered medications for this visit.         Allergies:  No Known Allergies    Review of Systems  A comprehensive review of systems was negative except for that written in the HPI. Objective:     Exam:    Visit Vitals    /82 (BP 1 Location: Left arm, BP Patient Position: Sitting)    Pulse 78    Temp 97.9 °F (36.6 °C) (Oral)    Ht 5' 1\" (1.549 m)    Wt 62.1 kg (137 lb)    SpO2 96%    BMI 25.89 kg/m2     General appearance: alert, cooperative, no distress, appears stated age  Eyes: negative  Lungs: clear to auscultation bilaterally  Heart: regular rate and rhythm  Abdomen: soft, non-distended. Mild epigastric tenderness. No rebound or guarding. Extremities: extremities normal, atraumatic, no cyanosis or edema. CATINA. Skin: Skin color, texture, turgor normal. No rashes or lesions. Neurologic: Grossly normal      Assessment:     Symptomatic paraesophageal hernia  Hx of Baljinder ulcer  GERD    Plan:     Esophageal manometry. Davinci paraesophageal hernia repair with fundoplication   Risks, benefit, and alternative to surgery was discussed with the patient. The risks of surgery include but not limited to infection, bleeding, intraabdominal organ injury, vague nerve injury, recurrence, dysphagia following surgery, possible conversion to open, and the risks of general anesthetic. Patient agreeable to surgery. All questions answered.

## 2018-07-26 ENCOUNTER — HOSPITAL ENCOUNTER (OUTPATIENT)
Age: 78
Setting detail: OUTPATIENT SURGERY
Discharge: HOME | End: 2018-07-26
Attending: SPECIALIST | Admitting: SPECIALIST
Payer: MEDICARE

## 2018-07-26 VITALS
SYSTOLIC BLOOD PRESSURE: 129 MMHG | WEIGHT: 137 LBS | HEIGHT: 61 IN | HEART RATE: 65 BPM | RESPIRATION RATE: 14 BRPM | OXYGEN SATURATION: 98 % | DIASTOLIC BLOOD PRESSURE: 89 MMHG | BODY MASS INDEX: 25.86 KG/M2

## 2018-07-26 PROCEDURE — 74011000250 HC RX REV CODE- 250: Performed by: SPECIALIST

## 2018-07-26 PROCEDURE — 76040000007: Performed by: SPECIALIST

## 2018-07-26 RX ORDER — LIDOCAINE HYDROCHLORIDE 20 MG/ML
JELLY TOPICAL ONCE
Status: COMPLETED | OUTPATIENT
Start: 2018-07-26 | End: 2018-07-26

## 2018-07-26 RX ADMIN — LIDOCAINE HYDROCHLORIDE 5 ML: 20 JELLY TOPICAL at 10:07

## 2018-07-26 NOTE — IP AVS SNAPSHOT
[address] [facility] 
[phone] Patient: Patsy Dixon MRN: [id number] :1940 A check denis indicates which time of day the medication should be taken. My Medications ASK your doctor about these medications Instructions Each Dose to Equal  
 Morning Noon Evening Bedtime  
 aspirin 81 mg tablet Your last dose was: Your next dose is: Take  by mouth daily. atenolol-chlorthalidone 50-25 mg per tablet Commonly known as:  Dora Joseph Your last dose was: Your next dose is: Take 1 Tab by mouth daily. 1 Tab  
    
   
   
   
  
 benzocaine 20 % Kit Your last dose was: Your next dose is:    
   
   
 by Mucous Membrane route. BIOTIN Your last dose was: Your next dose is:    
   
   
 by Does Not Apply route daily. CITRACAL PO Your last dose was: Your next dose is: Take  by mouth two (2) times a day. EVISTA 60 mg tablet Generic drug:  raloxifene Your last dose was: Your next dose is: Take  by mouth daily. FIBERCON PO Your last dose was: Your next dose is: Take  by mouth two (2) times a day. FLUCONAZOLE (BULK) Your last dose was: Your next dose is:    
   
   
 by Does Not Apply route. KLOR-CON M20 20 mEq tablet Generic drug:  potassium chloride Your last dose was: Your next dose is: Take 20 mEq by mouth two (2) times a day. 20 mEq LODRANE PO Your last dose was: Your next dose is: Take  by mouth daily. Indications: 2 pills once a day  MAGTRATE PO  
   
 Your last dose was: Your next dose is: Take  by mouth daily. metFORMIN 500 mg tablet Commonly known as:  GLUCOPHAGE Your last dose was: Your next dose is: Take 500 mg by mouth daily (with breakfast). 500 mg NexIUM 40 mg capsule Generic drug:  esomeprazole Your last dose was: Your next dose is: Take 40 mg by mouth two (2) times a day. 40 mg  
    
   
   
   
  
 OTHER Your last dose was: Your next dose is:    
   
   
 daily. Indications: fluticasone nasal spray OTHER(NON-FORMULARY) Your last dose was: Your next dose is:    
   
   
 two (2) times a day. Indications: benzonotate VITAMIN D2 PO Your last dose was: Your next dose is: Take  by mouth two (2) times a day.

## 2018-07-26 NOTE — IP AVS SNAPSHOT
[address] [place] [address] 
[phone] Patient: Merlin Sarks MRN: [id number] :1940 About your hospitalization You were admitted on:  2018 You last received care in the:  [facility] ENDOSCOPY You were discharged on:  2018 Why you were hospitalized Your primary diagnosis was:  Not on File Follow-up Information None Your Scheduled Appointments 2018 10:00 AM EDT  
PREADMISSION TEST with [facility] PAT ROOM P3 [facility] Preadmit Testing (RI OR PRE ASSESSMENT) [address] [facility]  
[phone]  NISSEN FUNDOPLICATION ROBOTIC ASSISTED with Antione Fofana MD  
[facility] SURGERY (RI OR PRE ASSESSMENT) [address] [facility]  
[phone] 2018  2:20 PM EDT  
POST OP with Antione Fofana MD  
[facility] Dr. Laci Rebolledo [facility] ([facility] [place] [address], [address] [facility]  
[phone] Discharge Orders None A check denis indicates which time of day the medication should be taken. My Medications ASK your doctor about these medications Instructions Each Dose to Equal  
 Morning Noon Evening Bedtime  
 aspirin 81 mg tablet Your last dose was: Your next dose is: Take  by mouth daily. atenolol-chlorthalidone 50-25 mg per tablet Commonly known as:  [place] Second Your last dose was: Your next dose is: Take 1 Tab by mouth daily. 1 Tab  
    
   
   
   
  
 benzocaine 20 % Kit Your last dose was: Your next dose is:    
   
   
 by Mucous Membrane route. BIOTIN Your last dose was: Your next dose is:    
   
   
 by Does Not Apply route daily. CITRACAL PO Your last dose was: Your next dose is: Take  by mouth two (2) times a day. EVISTA 60 mg tablet Generic drug:  raloxifene Your last dose was: Your next dose is: Take  by mouth daily. FIBERCON PO Your last dose was: Your next dose is: Take  by mouth two (2) times a day. FLUCONAZOLE (BULK) Your last dose was: Your next dose is:    
   
   
 by Does Not Apply route. KLOR-CON M20 20 mEq tablet Generic drug:  potassium chloride Your last dose was: Your next dose is: Take 20 mEq by mouth two (2) times a day. 20 mEq LODRANE PO Your last dose was: Your next dose is: Take  by mouth daily. Indications: 2 pills once a day MAGTRATE PO Your last dose was: Your next dose is: Take  by mouth daily. metFORMIN 500 mg tablet Commonly known as:  GLUCOPHAGE Your last dose was: Your next dose is: Take 500 mg by mouth daily (with breakfast). 500 mg NexIUM 40 mg capsule Generic drug:  esomeprazole Your last dose was: Your next dose is: Take 40 mg by mouth two (2) times a day. 40 mg  
    
   
   
   
  
 OTHER Your last dose was: Your next dose is:    
   
   
 daily. Indications: fluticasone nasal spray OTHER(NON-FORMULARY) Your last dose was: Your next dose is:    
   
   
 two (2) times a day. Indications: benzonotate VITAMIN D2 PO Your last dose was: Your next dose is: Take  by mouth two (2) times a day. Discharge Instructions Claudene Montane [id number] 1940 MANOMETRY DISCHARGE INSTRUCTION You may resume your regular diet as tolerated. You may resume your normal daily activities. If you develop a sore throat- throat lozenges or warm salt water gargles will help. Call your Physician if you have any complications or questions. Petra [employer] Activation Thank you for requesting access to Buzzoo. Please follow the instructions below to securely access and download your online medical record. Buzzoo allows you to send messages to your doctor, view your test results, renew your prescriptions, schedule appointments, and more. How Do I Sign Up? 1. In your internet browser, go to www."[email or web address] 
2. Click on the First Time User? Click Here link in the Sign In box. You will be redirect to the New Member Sign Up page. 3. Enter your Buzzoo Access Code exactly as it appears below. You will not need to use this code after youve completed the sign-up process. If you do not sign up before the expiration date, you must request a new code. Buzzoo Access Code: [email or web address] Expires: 10/19/2018 10:10 AM (This is the date your Buzzoo access code will ) 4. Enter the last four digits of your Social Security Number (xxxx) and Date of Birth (mm/dd/yyyy) as indicated and click Submit. You will be taken to the next sign-up page. 5. Create a Buzzoo ID. This will be your Buzzoo login ID and cannot be changed, so think of one that is secure and easy to remember. 6. Create a Buzzoo password. You can change your password at any time. 7. Enter your Password Reset Question and Answer. This can be used at a later time if you forget your password. 8. Enter your e-mail address. You will receive e-mail notification when new information is available in [address] 9. Click Sign Up. You can now view and download portions of your medical record. 10. Click the Download Summary menu link to download a portable copy of your medical information. Additional Information If you have questions, please visit the Frequently Asked Questions section of the [employer] website at [email or web address] Remember, [employer] is NOT to be used for urgent needs. For medical emergencies, dial 911. Introducing [facility]! [employer] introduces Partschannel patient portal. Now you can access parts of your medical record, email your doctor's office, and request medication refills online. 1. In your internet browser, go to [email or web address] 2. Click on the First Time User? Click Here link in the Sign In box. You will see the New Member Sign Up page. 3. Enter your Partschannel Access Code exactly as it appears below. You will not need to use this code after youve completed the sign-up process. If you do not sign up before the expiration date, you must request a new code. · Partschannel Access Code: [email or web address] Expires: 10/19/2018 10:10 AM 
 
4. Enter the last four digits of your Social Security Number (xxxx) and Date of Birth (mm/dd/yyyy) as indicated and click Submit. You will be taken to the next sign-up page. 5. Create a Partschannel ID. This will be your Partschannel login ID and cannot be changed, so think of one that is secure and easy to remember. 6. Create a Partschannel password. You can change your password at any time. 7. Enter your Password Reset Question and Answer. This can be used at a later time if you forget your password. 8. Enter your e-mail address. You will receive e-mail notification when new information is available in [address] 9. Click Sign Up. You can now view and download portions of your medical record. 10. Click the Download Summary menu link to download a portable copy of your medical information. If you have questions, please visit the Frequently Asked Questions section of the Part[employer] website. Remember, GeoMet[employer]t is NOT to be used for urgent needs. For medical emergencies, dial 911. Now available from your iPhone and Android! Introducing Hany Valle As a Carlota Corteraer patient, I wanted to make you aware of our electronic visit tool called Hany Valle. [facility] [employer] allows you to connect within minutes with a medical provider 24 hours a day, seven days a week via a mobile device or tablet or logging into a secure website from your computer. You can access Hany Valle from anywhere in the [place]. A virtual visit might be right for you when you have a simple condition and feel like you just dont want to get out of bed, or cant get away from work for an appointment, when your regular Carlota Bolster provider is not available (evenings, weekends or holidays), or when youre out of town and need minor care. Electronic visits cost only $49 and if the [facility] provider determines a prescription is needed to treat your condition, one can be electronically transmitted to a nearby pharmacy*. Please take a moment to enroll today if you have not already done so. The enrollment process is free and takes just a few minutes. To enroll, please download the [facility] mary to your tablet or phone, or visit [email or web address] to enroll on your computer. And, as an [address] [place] [address] patient with a [employer] account, the results of your visits will be scanned into your electronic medical record and your primary care provider will be able to view the scanned results. We urge you to continue to see your regular [facility] provider for your ongoing medical care. And while your primary care provider may not be the one available when you seek a Hany Valle virtual visit, the peace of mind you get from getting a real diagnosis real time can be priceless. For more information on Hany Valle, view our Frequently Asked Questions (FAQs) at [email or web address]. Sincerely, 
 
Valentino Milks, MD 
Chief Medical Officer [facility] Nehal Pantoja *:  certain medications cannot be prescribed via Hany Valle Providers Seen During Your Hospitalization Provider Specialty Primary office phone Didi Stone MD Gastroenterology [phone] Your Primary Care Physician (PCP) Primary Care Physician Office Phone Office Hoseax Ulises Ge [phone] You are allergic to the following Allergen Reactions Penicillins Rash Unsure of reaction, has been 20yrs since given and cannot remember extent of allergy, but has been avoiding this drug class Recent Documentation Height Weight Breastfeeding? BMI OB Status Smoking Status 1.549 m 62.1 kg No 25.89 kg/m2 Postmenopausal Never Smoker Emergency Contacts Name Discharge Info Relation Home Work Mobile [address] [5] Child [2] [phone] Patient Belongings The following personal items are in your possession at time of discharge: 
  Dental Appliances: Lowers, At home  Visual Aid: Glasses (reading) Please provide this summary of care documentation to your next provider. Signatures-by signing, you are acknowledging that this After Visit Summary has been reviewed with you and you have received a copy. Patient Signature:  ____________________________________________________________ Date:  ____________________________________________________________  
  
Dori Delcid Provider Signature:  ____________________________________________________________ Date:  ____________________________________________________________

## 2018-07-26 NOTE — PROGRESS NOTES
5cc viscous lidocaine inhaled into left nare per MD orders. Probe inserted into  left nare with moderate difficulty. Pt tolerated procedure well.

## 2018-07-26 NOTE — DISCHARGE INSTRUCTIONS
Ela Sanchez  [id number]  1940      MANOMETRY DISCHARGE INSTRUCTION    You may resume your regular diet as tolerated. You may resume your normal daily activities. If you develop a sore throat- throat lozenges or warm salt water gargles will help. Call your Physician if you have any complications or questions. [employer] Activation    Thank you for requesting access to [employer]. Please follow the instructions below to securely access and download your online medical record. [employer] allows you to send messages to your doctor, view your test results, renew your prescriptions, schedule appointments, and more. How Do I Sign Up? 1. In your internet browser, go to [email or web address]  2. Click on the First Time User? Click Here link in the Sign In box. You will be redirect to the New Member Sign Up page. 3. Enter your Black Ocean Access Code exactly as it appears below. You will not need to use this code after youve completed the sign-up process. If you do not sign up before the expiration date, you must request a new code. [employer] Access Code: [email or web address]  Expires: 10/19/2018 10:10 AM (This is the date your Black [employer] access code will )    4. Enter the last four digits of your Social Security Number (xxxx) and Date of Birth (mm/dd/yyyy) as indicated and click Submit. You will be taken to the next sign-up page. 5. Create a Black Ocean ID. This will be your [employer] Ocean login ID and cannot be changed, so think of one that is secure and easy to remember. 6. Create a Black Ocean password. You can change your password at any time. 7. Enter your Password Reset Question and Answer. This can be used at a later time if you forget your password. 8. Enter your e-mail address. You will receive e-mail notification when new information is available in [address] 9. Click Sign Up. You can now view and download portions of your medical record.   10. Click the Download Summary menu link to download a portable copy of your medical information. Additional Information    If you have questions, please visit the Frequently Asked Questions section of the [facility] website at [email or web address] Remember, [facility] is NOT to be used for urgent needs. For medical emergencies, dial 911.

## 2018-07-30 NOTE — OP NOTES
[address]  OPERATIVE REPORT    Marci Lin  MR#: [id number]  : 1940  ACCOUNT #: [de-identified]   DATE OF SERVICE: 2018    SPECIMENS REMOVED:  None. ANESTHESIA:  Topical.    ESTIMATED BLOOD LOSS:  None. COMPLICATIONS: None. IMPLANTS: None. PREOPERATIVE DIAGNOSIS:  dysphagia    POSTOPERATIVE DIAGNOSES:  1. Abnormal study. 2.  All impedance boluses failed to clear completely. 3.  Type 3 achalasia. 4.  Large hiatal hernia. PROCEDURE PERFORMED: esophageal manometry, impedance manometry    SURGEON:  emerald Wallis    ASSISTANT:  Monty YANCEY    DESCRIPTION OF PROCEDURE:  High-resolution esophageal manometry was performed by the nursing staff with subsequent interpretation by Dr. Kayla Hodge. The lower esophageal sphincter is at 34 cm and for a distance of 2.3 cm distally. A large 7.1 cm hiatal hernia is present. Lower esophageal sphincter pressures are as follows:  Respiratory minimum 12,  respiratory mean 21.1, residual after swallowing 16.5 (normal less than 15). Upper esophageal sphincter pressures are not reported here. There is not a reliable test for measurement of or interpretation of clinical upper esophageal sphincter disorders. Wet swallows were administered. By standard criteria, all are peristaltic. The mean wave amplitude is 108.1 mmHg, which is normal.  The wave duration is normal at 4.3 seconds. The velocity is normal at 4.2 cm per second. High resolution scoring is as follows:  DCI normal 1859.1, contractile front velocity normal at 4.3, intrabolus pressure at LES normal -1.2, intrabolus pressure average maximum body of esophagus elevated at 19.5 (normal less than 17). [place] scoring is as follows:  Distal latency 3.3, percent failed 0, percent pan esophageal pressurization 0, percent premature 100, percent rapid 0, percent large break 0, percent small break 0.     Impedance manometry was performed with a flavored electrolyte solution. All impedance boluses failed to clear completely. This is largely due to retention in the mid esophagus. COMMENT:  Type 3 achalasia is a preliminary diagnosis on this patient. This manometry needs further review. Stanley scoring is:  1. 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 This is borderline EGJ outflow obstruction. 2.  No normal peristalsis in the body, and spastic esophagus present with premature contractions of 100% (distal latency less than 4.5). These two together make achalasia subtype 3, even in the presence of what appears to be a peristaltic contraction by normal manometry. Further review is pending.       Otoniel Miramontes MD       RFK / HN  D: 07/30/2018 18:13     T: 07/30/2018 18:43  JOB #: [id number]  CC: Ana Tran MD  CC: Aissatou Mata MD

## 2018-07-31 ENCOUNTER — HOSPITAL ENCOUNTER (OUTPATIENT)
Dept: PREADMISSION TESTING | Age: 78
Discharge: HOME | End: 2018-07-31
Attending: SURGERY
Payer: MEDICARE

## 2018-07-31 VITALS
HEART RATE: 68 BPM | WEIGHT: 138.89 LBS | TEMPERATURE: 97.8 F | DIASTOLIC BLOOD PRESSURE: 79 MMHG | BODY MASS INDEX: 26.22 KG/M2 | OXYGEN SATURATION: 100 % | SYSTOLIC BLOOD PRESSURE: 157 MMHG | HEIGHT: 61 IN | RESPIRATION RATE: 20 BRPM

## 2018-07-31 LAB
ANION GAP SERPL CALC-SCNC: 7 MMOL/L (ref 5–15)
APPEARANCE UR: CLEAR
ATRIAL RATE: 69 BPM
BACTERIA URNS QL MICRO: NEGATIVE /HPF
BASOPHILS # BLD: 0 K/UL (ref 0–0.1)
BASOPHILS NFR BLD: 1 % (ref 0–1)
BILIRUB UR QL: NEGATIVE
BUN SERPL-MCNC: 6 MG/DL (ref 6–20)
BUN/CREAT SERPL: 6 (ref 12–20)
CALCIUM SERPL-MCNC: 9.7 MG/DL (ref 8.5–10.1)
CALCULATED P AXIS, ECG09: 73 DEGREES
CALCULATED R AXIS, ECG10: -46 DEGREES
CALCULATED T AXIS, ECG11: 61 DEGREES
CHLORIDE SERPL-SCNC: 103 MMOL/L (ref 97–108)
CO2 SERPL-SCNC: 28 MMOL/L (ref 21–32)
COLOR UR: NORMAL
CREAT SERPL-MCNC: 1.07 MG/DL (ref 0.55–1.02)
DIAGNOSIS, 93000: NORMAL
DIFFERENTIAL METHOD BLD: ABNORMAL
EOSINOPHIL # BLD: 0.2 K/UL (ref 0–0.4)
EOSINOPHIL NFR BLD: 4 % (ref 0–7)
EPITH CASTS URNS QL MICRO: NORMAL /LPF
ERYTHROCYTE [DISTWIDTH] IN BLOOD BY AUTOMATED COUNT: 13.2 % (ref 11.5–14.5)
GLUCOSE SERPL-MCNC: 120 MG/DL (ref 65–100)
GLUCOSE UR STRIP.AUTO-MCNC: NEGATIVE MG/DL
HCT VFR BLD AUTO: 41.7 % (ref 35–47)
HGB BLD-MCNC: 14.3 G/DL (ref 11.5–16)
HGB UR QL STRIP: NEGATIVE
IMM GRANULOCYTES # BLD: 0.1 K/UL (ref 0–0.04)
IMM GRANULOCYTES NFR BLD AUTO: 1 % (ref 0–0.5)
KETONES UR QL STRIP.AUTO: NEGATIVE MG/DL
LEUKOCYTE ESTERASE UR QL STRIP.AUTO: NEGATIVE
LYMPHOCYTES # BLD: 1.4 K/UL (ref 0.8–3.5)
LYMPHOCYTES NFR BLD: 27 % (ref 12–49)
MCH RBC QN AUTO: 30.5 PG (ref 26–34)
MCHC RBC AUTO-ENTMCNC: 34.3 G/DL (ref 30–36.5)
MCV RBC AUTO: 88.9 FL (ref 80–99)
MONOCYTES # BLD: 0.3 K/UL (ref 0–1)
MONOCYTES NFR BLD: 6 % (ref 5–13)
NEUTS SEG # BLD: 3.1 K/UL (ref 1.8–8)
NEUTS SEG NFR BLD: 61 % (ref 32–75)
NITRITE UR QL STRIP.AUTO: NEGATIVE
NRBC # BLD: 0 K/UL (ref 0–0.01)
NRBC BLD-RTO: 0 PER 100 WBC
P-R INTERVAL, ECG05: 198 MS
PH UR STRIP: 7 [PH] (ref 5–8)
PLATELET # BLD AUTO: 172 K/UL (ref 150–400)
PMV BLD AUTO: 9.6 FL (ref 8.9–12.9)
POTASSIUM SERPL-SCNC: 4.2 MMOL/L (ref 3.5–5.1)
PROT UR STRIP-MCNC: NEGATIVE MG/DL
Q-T INTERVAL, ECG07: 414 MS
QRS DURATION, ECG06: 86 MS
QTC CALCULATION (BEZET), ECG08: 443 MS
RBC # BLD AUTO: 4.69 M/UL (ref 3.8–5.2)
RBC #/AREA URNS HPF: NORMAL /HPF (ref 0–5)
SODIUM SERPL-SCNC: 138 MMOL/L (ref 136–145)
SP GR UR REFRACTOMETRY: 1.01 (ref 1–1.03)
UA: UC IF INDICATED,UAUC: NORMAL
UROBILINOGEN UR QL STRIP.AUTO: 0.2 EU/DL (ref 0.2–1)
VENTRICULAR RATE, ECG03: 69 BPM
WBC # BLD AUTO: 5 K/UL (ref 3.6–11)
WBC URNS QL MICRO: NORMAL /HPF (ref 0–4)

## 2018-07-31 PROCEDURE — 85025 COMPLETE CBC W/AUTO DIFF WBC: CPT | Performed by: SURGERY

## 2018-07-31 PROCEDURE — 81001 URINALYSIS AUTO W/SCOPE: CPT | Performed by: SURGERY

## 2018-07-31 PROCEDURE — 36415 COLL VENOUS BLD VENIPUNCTURE: CPT | Performed by: SURGERY

## 2018-07-31 PROCEDURE — 80048 BASIC METABOLIC PNL TOTAL CA: CPT | Performed by: SURGERY

## 2018-07-31 PROCEDURE — 93005 ELECTROCARDIOGRAM TRACING: CPT

## 2018-07-31 RX ORDER — OLMESARTAN MEDOXOMIL 20 MG/1
20 TABLET ORAL
COMMUNITY
End: 2021-05-10

## 2018-07-31 RX ORDER — RISEDRONATE SODIUM 35 MG/1
35 TABLET, FILM COATED ORAL
COMMUNITY

## 2018-07-31 RX ORDER — LANOLIN ALCOHOL/MO/W.PET/CERES
500 CREAM (GRAM) TOPICAL
COMMUNITY

## 2018-07-31 RX ORDER — MINERAL OIL
180 ENEMA (ML) RECTAL
COMMUNITY

## 2018-07-31 RX ORDER — ATORVASTATIN CALCIUM 10 MG/1
10 TABLET, FILM COATED ORAL
COMMUNITY

## 2018-07-31 RX ORDER — DOXEPIN HYDROCHLORIDE 25 MG/1
25 CAPSULE ORAL
COMMUNITY
End: 2021-06-25

## 2018-07-31 RX ORDER — ATENOLOL 50 MG/1
50 TABLET ORAL
COMMUNITY
End: 2021-06-27

## 2018-07-31 NOTE — PERIOP NOTES
[facility]  Preoperative Instructions        Surgery Date 08/09/18          Time of Arrival 0700  Contact # [phone] cell    1. On the day of your surgery, please report to the Surgical Services Registration Desk and sign in at your designated time. The Surgery Center is located to the right of the Emergency Room. 2. You must have someone with you to drive you home. You should not drive a car for 24 hours following surgery. Please make arrangements for a friend or family member to stay with you for the first 24 hours after your surgery. 3. Do not have anything to eat or drink (including water, gum, mints, coffee, juice) after midnight ?08/08/18   . ? This may not apply to medications prescribed by your physician. ?(Please note below the special instructions with medications to take the morning of your procedure.)    4. We recommend you do not drink any alcoholic beverages for 24 hours before and after your surgery. 5. Contact your surgeons office for instructions on the following medications: non-steroidal anti-inflammatory drugs (i.e. Advil, Aleve), vitamins, and supplements. (Some surgeons will want you to stop these medications prior to surgery and others may allow you to take them)  **If you are currently taking Plavix, Coumadin, Aspirin and/or other blood-thinning agents, contact your surgeon for instructions. ** Your surgeon will partner with the physician prescribing these medications to determine if it is safe to stop or if you need to continue taking. Please do not stop taking these medications without instructions from your surgeon    6. Wear comfortable clothes. Wear glasses instead of contacts. Do not bring any money or jewelry. Please bring picture ID, insurance card, and any prearranged co-payment or hospital payment. Do not wear make-up, particularly mascara the morning of your surgery.   Do not wear nail polish, particularly if you are having foot /hand surgery. Wear your hair loose or down, no ponytails, buns, dez pins or clips. All body piercings must be removed. Please shower with antibacterial soap for three consecutive days before and on the morning of surgery, but do not apply any lotions, powders or deodorants after the shower on the day of surgery. Please use a fresh towels after each shower. Please sleep in clean clothes and change bed linens the night before surgery. Please do not shave for 48 hours prior to surgery. Shaving of the face is acceptable. 7. You should understand that if you do not follow these instructions your surgery may be cancelled. If your physical condition changes (I.e. fever, cold or flu) please contact your surgeon as soon as possible. 8. It is important that you be on time. If a situation occurs where you may be late, please call [phone] (OR Holding Area). 9. If you have any questions and or problems, please call [phone] (Pre-admission Testing). 10. Your surgery time may be subject to change. You will receive a phone call the evening prior if your time changes. 11.  If having outpatient surgery, you must have someone to drive you here, stay with you during the duration of your stay, and to drive you home at time of discharge. 12.   In an effort to improve the efficiency, privacy, and safety for all of our Pre-op patients visitors are not allowed in the Holding area. Once you arrive and are registered your family/visitors will be asked to remain in the waiting room. The Pre-op staff will get you from the Surgical Waiting Area and will explain to you and your family/visitors that the Pre-op phase is beginning. The staff will answer any questions and provide instructions for tracking of the patient, by use of the existing tracking number and color-coded status board in the waiting room.   At this time the staff will also ask for your designated spokesperson information in the event that the physician or staff need to provide an update or obtain any pertinent information. The designated spokesperson will be notified if the physician needs to speak to family during the pre-operative phase. If at any time your family/visitors has questions or concerns they may approach the volunteer desk in the waiting area for assistance. Special Instructions:Practice with incentive spirometry/please bring incentive spirometry back to the hospital for use    MEDICATIONS  [address] S[place] Setting      I understand a pre-operative phone call will be made to verify my surgery time. In the event that I am not available, I give permission for a message to be left on my answering service and/or with another person?   yes          ___________________      __________   _________    (Signature of Patient)             (Witness)                (Date and Time)

## 2018-07-31 NOTE — PERIOP NOTES
Incentive Spirometer        Using the incentive spirometer helps expand the small air sacs of your lungs, helps you breathe deeply, and helps improve your lung function. Use your incentive spirometer twice a day (10 breaths each time) prior to surgery. How to Use Your Incentive Spirometer:  1. Hold the incentive spirometer in an upright position. 2. Breathe out as usual.   3. Place the mouthpiece in your mouth and seal your lips tightly around it. 4. Take a deep breath. Breathe in slowly and as deeply as possible. Keep the blue flow rate guide between the arrows. 5. Hold your breath as long as possible. Then exhale slowly and allow the piston to fall to the bottom of the column. 6. Rest for a few seconds and repeat steps one through five at least 10 times. PAT Tidal Volume____1500______________  x___2_____________  Date____0731/18___________________    Leonard Tyson THE INCENTIVE SPIROMETER WITH YOU TO THE HOSPITAL ON THE DAY OF YOUR SURGERY. Opportunity given to ask and answer questions as well as to observe return demonstration.     Patient signature_____________________________    Witness____________________________

## 2018-08-02 RX ORDER — SODIUM CHLORIDE, SODIUM LACTATE, POTASSIUM CHLORIDE, CALCIUM CHLORIDE 600; 310; 30; 20 MG/100ML; MG/100ML; MG/100ML; MG/100ML
25 INJECTION, SOLUTION INTRAVENOUS CONTINUOUS
Status: CANCELLED | OUTPATIENT
Start: 2018-08-09

## 2018-08-09 ENCOUNTER — ANESTHESIA (OUTPATIENT)
Dept: SURGERY | Age: 78
End: 2018-08-09
Payer: MEDICARE

## 2018-08-09 ENCOUNTER — HOSPITAL ENCOUNTER (OUTPATIENT)
Age: 78
Setting detail: OBSERVATION
Discharge: HOME | End: 2018-08-11
Attending: SURGERY | Admitting: SURGERY
Payer: MEDICARE

## 2018-08-09 ENCOUNTER — ANESTHESIA EVENT (OUTPATIENT)
Dept: SURGERY | Age: 78
End: 2018-08-09
Payer: MEDICARE

## 2018-08-09 DIAGNOSIS — K21.9 HIATAL HERNIA WITH GERD: Primary | ICD-10-CM

## 2018-08-09 DIAGNOSIS — K44.9 HIATAL HERNIA WITH GERD: Primary | ICD-10-CM

## 2018-08-09 LAB
GLUCOSE BLD STRIP.AUTO-MCNC: 136 MG/DL (ref 65–100)
GLUCOSE BLD STRIP.AUTO-MCNC: 147 MG/DL (ref 65–100)
GLUCOSE BLD STRIP.AUTO-MCNC: 161 MG/DL (ref 65–100)
GLUCOSE BLD STRIP.AUTO-MCNC: 81 MG/DL (ref 65–100)
SERVICE CMNT-IMP: ABNORMAL
SERVICE CMNT-IMP: NORMAL

## 2018-08-09 PROCEDURE — 77030011640 HC PAD GRND REM COVD -A: Performed by: SURGERY

## 2018-08-09 PROCEDURE — 77030018846 HC SOL IRR STRL H20 ICUM -A: Performed by: SURGERY

## 2018-08-09 PROCEDURE — 77030016151 HC PROTCTR LNS DFOG COVD -B: Performed by: SURGERY

## 2018-08-09 PROCEDURE — 77030026438 HC STYL ET INTUB CARD -A: Performed by: ANESTHESIOLOGY

## 2018-08-09 PROCEDURE — 99218 HC RM OBSERVATION: CPT

## 2018-08-09 PROCEDURE — 77030020782 HC GWN BAIR PAWS FLX 3M -B

## 2018-08-09 PROCEDURE — C1781 MESH (IMPLANTABLE): HCPCS | Performed by: SURGERY

## 2018-08-09 PROCEDURE — 74011250637 HC RX REV CODE- 250/637: Performed by: SURGERY

## 2018-08-09 PROCEDURE — 77030035279 HC SEAL VSL ENDOWR XI INTU -I2: Performed by: SURGERY

## 2018-08-09 PROCEDURE — 74011000250 HC RX REV CODE- 250

## 2018-08-09 PROCEDURE — 77030008684 HC TU ET CUF COVD -B: Performed by: ANESTHESIOLOGY

## 2018-08-09 PROCEDURE — 77030010351 HC TRCR ENDOSC VSTP COVD -B: Performed by: SURGERY

## 2018-08-09 PROCEDURE — 74011250636 HC RX REV CODE- 250/636: Performed by: ANESTHESIOLOGY

## 2018-08-09 PROCEDURE — 76010000877 HC OR TIME 2.5 TO 3HR INTENSV - TIER 2: Performed by: SURGERY

## 2018-08-09 PROCEDURE — 74011000250 HC RX REV CODE- 250: Performed by: SURGERY

## 2018-08-09 PROCEDURE — 77030003581 HC NDL INSUF VERES COVD -B: Performed by: SURGERY

## 2018-08-09 PROCEDURE — 77030008771 HC TU NG SALEM SUMP -A: Performed by: ANESTHESIOLOGY

## 2018-08-09 PROCEDURE — 74011250636 HC RX REV CODE- 250/636: Performed by: SURGERY

## 2018-08-09 PROCEDURE — 77030018390 HC SPNG HEMSTAT2 J&J -B: Performed by: SURGERY

## 2018-08-09 PROCEDURE — 77030002912 HC SUT ETHBND J&J -A: Performed by: SURGERY

## 2018-08-09 PROCEDURE — 77030039266 HC ADH SKN EXOFIN S2SG -A: Performed by: SURGERY

## 2018-08-09 PROCEDURE — 76060000036 HC ANESTHESIA 2.5 TO 3 HR: Performed by: SURGERY

## 2018-08-09 PROCEDURE — 77030032497 HC WRP SHLDR WO BGS SOLM -B

## 2018-08-09 PROCEDURE — 77030002933 HC SUT MCRYL J&J -A: Performed by: SURGERY

## 2018-08-09 PROCEDURE — 77030035277 HC OBTRTR BLDELSS DISP INTU -B: Performed by: SURGERY

## 2018-08-09 PROCEDURE — 77030035048 HC TRCR ENDOSC OPTCL COVD -B: Performed by: SURGERY

## 2018-08-09 PROCEDURE — 74011250636 HC RX REV CODE- 250/636

## 2018-08-09 PROCEDURE — 77030020703 HC SEAL CANN DISP INTU -B: Performed by: SURGERY

## 2018-08-09 PROCEDURE — 77030032490 HC SLV COMPR SCD KNE COVD -B: Performed by: SURGERY

## 2018-08-09 PROCEDURE — 77030013079 HC BLNKT BAIR HGGR 3M -A: Performed by: ANESTHESIOLOGY

## 2018-08-09 PROCEDURE — 76210000000 HC OR PH I REC 2 TO 2.5 HR: Performed by: SURGERY

## 2018-08-09 PROCEDURE — 82962 GLUCOSE BLOOD TEST: CPT

## 2018-08-09 PROCEDURE — 77030002996 HC SUT SLK J&J -A: Performed by: SURGERY

## 2018-08-09 DEVICE — BIO-A TISSUE REINFORCEMENT 7CMX10CM
Type: IMPLANTABLE DEVICE | Site: ESOPHAGUS | Status: FUNCTIONAL
Brand: GORE BIO-A TISSUE REINFORCEMENT

## 2018-08-09 RX ORDER — INSULIN LISPRO 100 [IU]/ML
INJECTION, SOLUTION INTRAVENOUS; SUBCUTANEOUS
Status: DISCONTINUED | OUTPATIENT
Start: 2018-08-09 | End: 2018-08-11 | Stop reason: HOSPADM

## 2018-08-09 RX ORDER — MIDAZOLAM HYDROCHLORIDE 1 MG/ML
0.5 INJECTION, SOLUTION INTRAMUSCULAR; INTRAVENOUS
Status: DISCONTINUED | OUTPATIENT
Start: 2018-08-09 | End: 2018-08-09 | Stop reason: HOSPADM

## 2018-08-09 RX ORDER — HYDROCODONE BITARTRATE AND ACETAMINOPHEN 5; 325 MG/1; MG/1
1 TABLET ORAL
Qty: 40 TAB | Refills: 0 | Status: SHIPPED | OUTPATIENT
Start: 2018-08-09 | End: 2021-06-25

## 2018-08-09 RX ORDER — SODIUM CHLORIDE 0.9 % (FLUSH) 0.9 %
5-10 SYRINGE (ML) INJECTION AS NEEDED
Status: DISCONTINUED | OUTPATIENT
Start: 2018-08-09 | End: 2018-08-11 | Stop reason: HOSPADM

## 2018-08-09 RX ORDER — ACETAMINOPHEN 10 MG/ML
1000 INJECTION, SOLUTION INTRAVENOUS EVERY 6 HOURS
Status: COMPLETED | OUTPATIENT
Start: 2018-08-09 | End: 2018-08-10

## 2018-08-09 RX ORDER — BUPIVACAINE HYDROCHLORIDE AND EPINEPHRINE 2.5; 5 MG/ML; UG/ML
INJECTION, SOLUTION EPIDURAL; INFILTRATION; INTRACAUDAL; PERINEURAL AS NEEDED
Status: DISCONTINUED | OUTPATIENT
Start: 2018-08-09 | End: 2018-08-09 | Stop reason: HOSPADM

## 2018-08-09 RX ORDER — HYDROMORPHONE HYDROCHLORIDE 1 MG/ML
0.5 INJECTION, SOLUTION INTRAMUSCULAR; INTRAVENOUS; SUBCUTANEOUS
Status: DISCONTINUED | OUTPATIENT
Start: 2018-08-09 | End: 2018-08-09 | Stop reason: HOSPADM

## 2018-08-09 RX ORDER — SODIUM CHLORIDE, SODIUM LACTATE, POTASSIUM CHLORIDE, CALCIUM CHLORIDE 600; 310; 30; 20 MG/100ML; MG/100ML; MG/100ML; MG/100ML
100 INJECTION, SOLUTION INTRAVENOUS CONTINUOUS
Status: DISCONTINUED | OUTPATIENT
Start: 2018-08-09 | End: 2018-08-09 | Stop reason: HOSPADM

## 2018-08-09 RX ORDER — SODIUM CHLORIDE 0.9 % (FLUSH) 0.9 %
5-10 SYRINGE (ML) INJECTION EVERY 8 HOURS
Status: DISCONTINUED | OUTPATIENT
Start: 2018-08-09 | End: 2018-08-11 | Stop reason: HOSPADM

## 2018-08-09 RX ORDER — HYDRALAZINE HYDROCHLORIDE 20 MG/ML
20 INJECTION INTRAMUSCULAR; INTRAVENOUS
Status: DISCONTINUED | OUTPATIENT
Start: 2018-08-09 | End: 2018-08-11 | Stop reason: HOSPADM

## 2018-08-09 RX ORDER — OXYCODONE HYDROCHLORIDE 5 MG/1
5 TABLET ORAL
Status: DISCONTINUED | OUTPATIENT
Start: 2018-08-09 | End: 2018-08-11 | Stop reason: HOSPADM

## 2018-08-09 RX ORDER — GLYCOPYRROLATE 0.2 MG/ML
INJECTION INTRAMUSCULAR; INTRAVENOUS AS NEEDED
Status: DISCONTINUED | OUTPATIENT
Start: 2018-08-09 | End: 2018-08-09 | Stop reason: HOSPADM

## 2018-08-09 RX ORDER — LOSARTAN POTASSIUM 50 MG/1
50 TABLET ORAL
Status: DISCONTINUED | OUTPATIENT
Start: 2018-08-09 | End: 2018-08-11 | Stop reason: HOSPADM

## 2018-08-09 RX ORDER — FENTANYL CITRATE 50 UG/ML
25 INJECTION, SOLUTION INTRAMUSCULAR; INTRAVENOUS
Status: DISCONTINUED | OUTPATIENT
Start: 2018-08-09 | End: 2018-08-09 | Stop reason: HOSPADM

## 2018-08-09 RX ORDER — POTASSIUM CHLORIDE AND SODIUM CHLORIDE 450; 150 MG/100ML; MG/100ML
INJECTION, SOLUTION INTRAVENOUS CONTINUOUS
Status: DISCONTINUED | OUTPATIENT
Start: 2018-08-09 | End: 2018-08-11 | Stop reason: HOSPADM

## 2018-08-09 RX ORDER — ATENOLOL 50 MG/1
50 TABLET ORAL
Status: DISCONTINUED | OUTPATIENT
Start: 2018-08-10 | End: 2018-08-11 | Stop reason: HOSPADM

## 2018-08-09 RX ORDER — DIPHENHYDRAMINE HYDROCHLORIDE 50 MG/ML
12.5 INJECTION, SOLUTION INTRAMUSCULAR; INTRAVENOUS AS NEEDED
Status: DISCONTINUED | OUTPATIENT
Start: 2018-08-09 | End: 2018-08-09 | Stop reason: HOSPADM

## 2018-08-09 RX ORDER — LIDOCAINE HYDROCHLORIDE 10 MG/ML
0.1 INJECTION, SOLUTION EPIDURAL; INFILTRATION; INTRACAUDAL; PERINEURAL AS NEEDED
Status: DISCONTINUED | OUTPATIENT
Start: 2018-08-09 | End: 2018-08-09 | Stop reason: HOSPADM

## 2018-08-09 RX ORDER — NEOSTIGMINE METHYLSULFATE 1 MG/ML
INJECTION INTRAVENOUS AS NEEDED
Status: DISCONTINUED | OUTPATIENT
Start: 2018-08-09 | End: 2018-08-09 | Stop reason: HOSPADM

## 2018-08-09 RX ORDER — ROCURONIUM BROMIDE 10 MG/ML
INJECTION, SOLUTION INTRAVENOUS AS NEEDED
Status: DISCONTINUED | OUTPATIENT
Start: 2018-08-09 | End: 2018-08-09 | Stop reason: HOSPADM

## 2018-08-09 RX ORDER — ONDANSETRON 2 MG/ML
4 INJECTION INTRAMUSCULAR; INTRAVENOUS
Status: DISCONTINUED | OUTPATIENT
Start: 2018-08-09 | End: 2018-08-11 | Stop reason: HOSPADM

## 2018-08-09 RX ORDER — DEXAMETHASONE SODIUM PHOSPHATE 4 MG/ML
INJECTION, SOLUTION INTRA-ARTICULAR; INTRALESIONAL; INTRAMUSCULAR; INTRAVENOUS; SOFT TISSUE AS NEEDED
Status: DISCONTINUED | OUTPATIENT
Start: 2018-08-09 | End: 2018-08-09 | Stop reason: HOSPADM

## 2018-08-09 RX ORDER — ATENOLOL 50 MG/1
50 TABLET ORAL
Status: COMPLETED | OUTPATIENT
Start: 2018-08-09 | End: 2018-08-09

## 2018-08-09 RX ORDER — PROPOFOL 10 MG/ML
INJECTION, EMULSION INTRAVENOUS AS NEEDED
Status: DISCONTINUED | OUTPATIENT
Start: 2018-08-09 | End: 2018-08-09 | Stop reason: HOSPADM

## 2018-08-09 RX ORDER — LIDOCAINE HYDROCHLORIDE 20 MG/ML
INJECTION, SOLUTION EPIDURAL; INFILTRATION; INTRACAUDAL; PERINEURAL AS NEEDED
Status: DISCONTINUED | OUTPATIENT
Start: 2018-08-09 | End: 2018-08-09 | Stop reason: HOSPADM

## 2018-08-09 RX ORDER — METFORMIN HYDROCHLORIDE 500 MG/1
500 TABLET ORAL
Status: DISCONTINUED | OUTPATIENT
Start: 2018-08-10 | End: 2018-08-11 | Stop reason: HOSPADM

## 2018-08-09 RX ORDER — SODIUM CHLORIDE, SODIUM LACTATE, POTASSIUM CHLORIDE, CALCIUM CHLORIDE 600; 310; 30; 20 MG/100ML; MG/100ML; MG/100ML; MG/100ML
25 INJECTION, SOLUTION INTRAVENOUS CONTINUOUS
Status: DISCONTINUED | OUTPATIENT
Start: 2018-08-09 | End: 2018-08-09 | Stop reason: HOSPADM

## 2018-08-09 RX ORDER — ONDANSETRON 2 MG/ML
INJECTION INTRAMUSCULAR; INTRAVENOUS AS NEEDED
Status: DISCONTINUED | OUTPATIENT
Start: 2018-08-09 | End: 2018-08-09 | Stop reason: HOSPADM

## 2018-08-09 RX ORDER — MAGNESIUM SULFATE 100 %
4 CRYSTALS MISCELLANEOUS AS NEEDED
Status: DISCONTINUED | OUTPATIENT
Start: 2018-08-09 | End: 2018-08-11 | Stop reason: HOSPADM

## 2018-08-09 RX ORDER — DIPHENHYDRAMINE HYDROCHLORIDE 50 MG/ML
12.5 INJECTION, SOLUTION INTRAMUSCULAR; INTRAVENOUS
Status: ACTIVE | OUTPATIENT
Start: 2018-08-09 | End: 2018-08-10

## 2018-08-09 RX ORDER — MIDAZOLAM HYDROCHLORIDE 1 MG/ML
1 INJECTION, SOLUTION INTRAMUSCULAR; INTRAVENOUS AS NEEDED
Status: DISCONTINUED | OUTPATIENT
Start: 2018-08-09 | End: 2018-08-09 | Stop reason: HOSPADM

## 2018-08-09 RX ORDER — ACETAMINOPHEN 10 MG/ML
INJECTION, SOLUTION INTRAVENOUS AS NEEDED
Status: DISCONTINUED | OUTPATIENT
Start: 2018-08-09 | End: 2018-08-09 | Stop reason: HOSPADM

## 2018-08-09 RX ORDER — PANTOPRAZOLE SODIUM 40 MG/1
40 TABLET, DELAYED RELEASE ORAL
Status: DISCONTINUED | OUTPATIENT
Start: 2018-08-10 | End: 2018-08-11 | Stop reason: HOSPADM

## 2018-08-09 RX ORDER — HYDROMORPHONE HYDROCHLORIDE 2 MG/ML
INJECTION, SOLUTION INTRAMUSCULAR; INTRAVENOUS; SUBCUTANEOUS AS NEEDED
Status: DISCONTINUED | OUTPATIENT
Start: 2018-08-09 | End: 2018-08-09 | Stop reason: HOSPADM

## 2018-08-09 RX ORDER — ONDANSETRON 2 MG/ML
4 INJECTION INTRAMUSCULAR; INTRAVENOUS AS NEEDED
Status: DISCONTINUED | OUTPATIENT
Start: 2018-08-09 | End: 2018-08-09 | Stop reason: HOSPADM

## 2018-08-09 RX ORDER — DEXTROSE 50 % IN WATER (D50W) INTRAVENOUS SYRINGE
12.5-25 AS NEEDED
Status: DISCONTINUED | OUTPATIENT
Start: 2018-08-09 | End: 2018-08-11 | Stop reason: HOSPADM

## 2018-08-09 RX ORDER — PHENYLEPHRINE HCL IN 0.9% NACL 0.4MG/10ML
SYRINGE (ML) INTRAVENOUS AS NEEDED
Status: DISCONTINUED | OUTPATIENT
Start: 2018-08-09 | End: 2018-08-09 | Stop reason: HOSPADM

## 2018-08-09 RX ORDER — HYDROCODONE BITARTRATE AND ACETAMINOPHEN 5; 325 MG/1; MG/1
1 TABLET ORAL AS NEEDED
Status: DISCONTINUED | OUTPATIENT
Start: 2018-08-09 | End: 2018-08-09 | Stop reason: HOSPADM

## 2018-08-09 RX ORDER — CLINDAMYCIN PHOSPHATE 600 MG/50ML
600 INJECTION INTRAVENOUS EVERY 8 HOURS
Status: COMPLETED | OUTPATIENT
Start: 2018-08-09 | End: 2018-08-10

## 2018-08-09 RX ORDER — FENTANYL CITRATE 50 UG/ML
INJECTION, SOLUTION INTRAMUSCULAR; INTRAVENOUS AS NEEDED
Status: DISCONTINUED | OUTPATIENT
Start: 2018-08-09 | End: 2018-08-09 | Stop reason: HOSPADM

## 2018-08-09 RX ORDER — MORPHINE SULFATE 2 MG/ML
2 INJECTION, SOLUTION INTRAMUSCULAR; INTRAVENOUS
Status: DISCONTINUED | OUTPATIENT
Start: 2018-08-09 | End: 2018-08-11 | Stop reason: HOSPADM

## 2018-08-09 RX ORDER — FENTANYL CITRATE 50 UG/ML
50 INJECTION, SOLUTION INTRAMUSCULAR; INTRAVENOUS AS NEEDED
Status: DISCONTINUED | OUTPATIENT
Start: 2018-08-09 | End: 2018-08-09 | Stop reason: HOSPADM

## 2018-08-09 RX ORDER — CLINDAMYCIN PHOSPHATE 900 MG/50ML
900 INJECTION INTRAVENOUS
Status: COMPLETED | OUTPATIENT
Start: 2018-08-09 | End: 2018-08-09

## 2018-08-09 RX ADMIN — PROPOFOL 100 MG: 10 INJECTION, EMULSION INTRAVENOUS at 10:24

## 2018-08-09 RX ADMIN — FENTANYL CITRATE 25 MCG: 50 INJECTION, SOLUTION INTRAMUSCULAR; INTRAVENOUS at 14:50

## 2018-08-09 RX ADMIN — ACETAMINOPHEN 1000 MG: 10 INJECTION, SOLUTION INTRAVENOUS at 22:57

## 2018-08-09 RX ADMIN — NEOSTIGMINE METHYLSULFATE 3 MG: 1 INJECTION INTRAVENOUS at 12:39

## 2018-08-09 RX ADMIN — ROCURONIUM BROMIDE 10 MG: 10 INJECTION, SOLUTION INTRAVENOUS at 10:37

## 2018-08-09 RX ADMIN — Medication 80 MCG: at 11:15

## 2018-08-09 RX ADMIN — Medication 10 ML: at 22:57

## 2018-08-09 RX ADMIN — Medication 40 MCG: at 12:19

## 2018-08-09 RX ADMIN — ROCURONIUM BROMIDE 10 MG: 10 INJECTION, SOLUTION INTRAVENOUS at 11:47

## 2018-08-09 RX ADMIN — LIDOCAINE HYDROCHLORIDE 80 MG: 20 INJECTION, SOLUTION EPIDURAL; INFILTRATION; INTRACAUDAL; PERINEURAL at 10:24

## 2018-08-09 RX ADMIN — OXYCODONE HYDROCHLORIDE 5 MG: 5 TABLET ORAL at 16:38

## 2018-08-09 RX ADMIN — DEXAMETHASONE SODIUM PHOSPHATE 6 MG: 4 INJECTION, SOLUTION INTRA-ARTICULAR; INTRALESIONAL; INTRAMUSCULAR; INTRAVENOUS; SOFT TISSUE at 10:43

## 2018-08-09 RX ADMIN — ATENOLOL 50 MG: 50 TABLET ORAL at 18:43

## 2018-08-09 RX ADMIN — HYDROMORPHONE HYDROCHLORIDE 0.2 MG: 2 INJECTION, SOLUTION INTRAMUSCULAR; INTRAVENOUS; SUBCUTANEOUS at 11:51

## 2018-08-09 RX ADMIN — Medication 40 MCG: at 12:03

## 2018-08-09 RX ADMIN — ACETAMINOPHEN 1000 MG: 10 INJECTION, SOLUTION INTRAVENOUS at 16:38

## 2018-08-09 RX ADMIN — LOSARTAN POTASSIUM 50 MG: 50 TABLET ORAL at 20:20

## 2018-08-09 RX ADMIN — CLINDAMYCIN PHOSPHATE 600 MG: 12 INJECTION, SOLUTION INTRAVENOUS at 18:43

## 2018-08-09 RX ADMIN — FENTANYL CITRATE 50 MCG: 50 INJECTION, SOLUTION INTRAMUSCULAR; INTRAVENOUS at 10:15

## 2018-08-09 RX ADMIN — HYDRALAZINE HYDROCHLORIDE 20 MG: 20 INJECTION INTRAMUSCULAR; INTRAVENOUS at 20:02

## 2018-08-09 RX ADMIN — CLINDAMYCIN PHOSPHATE 900 MG: 18 INJECTION, SOLUTION INTRAVENOUS at 10:36

## 2018-08-09 RX ADMIN — ROCURONIUM BROMIDE 40 MG: 10 INJECTION, SOLUTION INTRAVENOUS at 10:24

## 2018-08-09 RX ADMIN — SODIUM CHLORIDE, SODIUM LACTATE, POTASSIUM CHLORIDE, AND CALCIUM CHLORIDE 25 ML/HR: 600; 310; 30; 20 INJECTION, SOLUTION INTRAVENOUS at 08:42

## 2018-08-09 RX ADMIN — Medication 10 ML: at 16:39

## 2018-08-09 RX ADMIN — SODIUM CHLORIDE AND POTASSIUM CHLORIDE: 4.5; 1.49 INJECTION, SOLUTION INTRAVENOUS at 16:38

## 2018-08-09 RX ADMIN — ACETAMINOPHEN 1000 MG: 10 INJECTION, SOLUTION INTRAVENOUS at 10:50

## 2018-08-09 RX ADMIN — ONDANSETRON 4 MG: 2 INJECTION INTRAMUSCULAR; INTRAVENOUS at 10:43

## 2018-08-09 RX ADMIN — FENTANYL CITRATE 50 MCG: 50 INJECTION, SOLUTION INTRAMUSCULAR; INTRAVENOUS at 10:20

## 2018-08-09 RX ADMIN — FENTANYL CITRATE 25 MCG: 50 INJECTION, SOLUTION INTRAMUSCULAR; INTRAVENOUS at 13:33

## 2018-08-09 RX ADMIN — SODIUM CHLORIDE, SODIUM LACTATE, POTASSIUM CHLORIDE, AND CALCIUM CHLORIDE: 600; 310; 30; 20 INJECTION, SOLUTION INTRAVENOUS at 11:23

## 2018-08-09 RX ADMIN — GLYCOPYRROLATE 0.4 MG: 0.2 INJECTION INTRAMUSCULAR; INTRAVENOUS at 12:39

## 2018-08-09 RX ADMIN — FENTANYL CITRATE 25 MCG: 50 INJECTION, SOLUTION INTRAMUSCULAR; INTRAVENOUS at 14:01

## 2018-08-09 NOTE — ANESTHESIA POSTPROCEDURE EVALUATION
Post-Anesthesia Evaluation and Assessment    Patient: Raúl Law MRN: [id number]  SSN: [id number]    YOB: 1940  Age: 77 y.o. Sex: female       Cardiovascular Function/Vital Signs  Visit Vitals    /80    Pulse (!) 48    Temp 36.6 °C (97.8 °F)    Resp 10    Ht 5' 1\" (1.549 m)    Wt 62.3 kg (137 lb 5.6 oz)    SpO2 96%    BMI 25.95 kg/m2       Patient is status post general anesthesia for Procedure(s):  DAVINCI MARISABEL-ESOPHAGEAL HERNIA REPAIR WITH FUNDOPLICATION. Nausea/Vomiting: None    Postoperative hydration reviewed and adequate. Pain:  Pain Scale 1: Numeric (0 - 10) (08/09/18 1401)  Pain Intensity 1: 4 (08/09/18 1401)   Managed    Neurological Status:   Neuro (WDL): Within Defined Limits (08/09/18 1305)   At baseline    Mental Status and Level of Consciousness: Arousable    Pulmonary Status:   O2 Device: Nasal cannula (08/09/18 1330)   Adequate oxygenation and airway patent    Complications related to anesthesia: None    Post-anesthesia assessment completed.  No concerns    Signed By: Carin Heard MD     August 9, 2018

## 2018-08-09 NOTE — H&P
Surgery H&P     Subjective:   Patient 65 y.o.  female was seen in my office on 6/19/17 for symptomatic hiatal hernia. Possible Davinci hiatal hernia repair was discussed but decided to hold off. However, patient's symptoms are getting worse. Patient was seen in ER on 7/21/18 with upper abdominal pain radiating to the back with nausea. No emesis. CT scan in the ER showed extremely large hiatal hernia containing entire fundus and part of the gastric body. The size of the hernia has increased since the last study. This degree of gastric herniation introduces risk of gastric volvulus. Mild hepatic steatosis. Severe diverticulosis without diverticulitis. Patient has been having intermittent post-prandial bloating and epigastric pain with dyspepsia. Patient also complains of intermittent dyspnea with activity and sometimes post-prandial.  Patient also reports occasional solid dysphagia. No nausea or vomiting. No change in bowel habits. No diarrhea or constipation. No chronic cough. No history of aspiration pneumonia or hoarseness. Patient with chronic history of GERD and has been on Nexium for 20 years. Symptoms are well controlled with Nexium.  Esophageal manometry has not been done yet.     Past Medical & Surgical History:       Past Medical History:   Diagnosis Date    Anemia 6/18/2013    Cancer ([address]       CLL    Epigastric pain 6/18/2013    Family history of colon cancer 6/18/2013     sister    GERD (gastroesophageal reflux disease)      Hypertension              Past Surgical History:   Procedure Laterality Date    HX CHOLECYSTECTOMY        HX KNEE ARTHROSCOPY         LEFT    HX VASCULAR ACCESS         SERGE CATH LL CHEST         Social History:  Social History            Social History    Marital status:        Spouse name: N/A    Number of children: N/A    Years of education: N/A          Occupational History    Not on file.           Social History Main Topics    Smoking status: Never Smoker    Smokeless tobacco: Never Used    Alcohol use No    Drug use: No    Sexual activity: Not on file           Other Topics Concern    Not on file      Social History Narrative         Family History:        Family History   Problem Relation Age of Onset    Heart Disease Mother      Arthritis-rheumatoid Mother           Medications:       Current Outpatient Prescriptions   Medication Sig    HYDROcodone-acetaminophen (LORTAB 7.5-325) 7.5-325 mg per tablet Take 1 Tab by mouth every eight (8) hours as needed for Pain. Max Daily Amount: 3 Tabs. Indications: Pain    ciprofloxacin HCl (CIPRO) 500 mg tablet Take 1 Tab by mouth two (2) times a day for 10 days.  metroNIDAZOLE (FLAGYL) 500 mg tablet Take 1 Tab by mouth three (3) times daily for 10 days.  metFORMIN (GLUCOPHAGE) 500 mg tablet Take 500 mg by mouth daily (with breakfast).  potassium chloride (KLOR-CON M20) 20 mEq tablet Take 20 mEq by mouth two (2) times a day.  atenolol-chlorthalidone (TENORETIC) 50-25 mg per tablet Take 1 Tab by mouth daily.  esomeprazole (NEXIUM) 40 mg capsule Take 40 mg by mouth two (2) times a day.  CALCIUM POLYCARBOPHIL (FIBERCON PO) Take  by mouth two (2) times a day.  CALCIUM CITRATE (CITRACAL PO) Take  by mouth two (2) times a day.  ERGOCALCIFEROL (VITAMIN D PO) Take  by mouth two (2) times a day.  benzocaine 20 % Kit by Mucous Membrane route.  OTHER,NON-FORMULARY, two (2) times a day. Indications: benzonotate    FLUCONAZOLE, BULK, by Does Not Apply route.  OTHER daily. Indications: fluticasone nasal spray    MAGNESIUM GLUCONATE (MAGTRATE PO) Take  by mouth daily.  P-EPHED HCL/BROMPHENIRAMIN (LODRANE PO) Take  by mouth daily. Indications: 2 pills once a day    aspirin 81 mg tablet Take  by mouth daily.  raloxifene (EVISTA) 60 mg tablet Take  by mouth daily.     BIOTIN by Does Not Apply route daily.      No current facility-administered medications for this visit.          Allergies:  No Known Allergies     Review of Systems  A comprehensive review of systems was negative except for that written in the HPI.     Objective:      Exam:          Visit Vitals    /82 (BP 1 Location: Left arm, BP Patient Position: Sitting)    Pulse 78    Temp 97.9 °F (36.6 °C) (Oral)    Ht 5' 1\" (1.549 m)    Wt 62.1 kg (137 lb)    SpO2 96%    BMI 25.89 kg/m2      General appearance: alert, cooperative, no distress, appears stated age  Eyes: negative  Lungs: clear to auscultation bilaterally  Heart: regular rate and rhythm  Abdomen: soft, non-distended. Mild epigastric tenderness. No rebound or guarding. Extremities: extremities normal, atraumatic, no cyanosis or edema. CATINA. Skin: Skin color, texture, turgor normal. No rashes or lesions. Neurologic: Grossly normal        Assessment:      Symptomatic paraesophageal hernia  Hx of Baljinder ulcer  GERD     Plan:      Esophageal manometry. Davinci paraesophageal hernia repair with fundoplication   Risks, benefit, and alternative to surgery was discussed with the patient. The risks of surgery include but not limited to infection, bleeding, intraabdominal organ injury, vague nerve injury, recurrence, dysphagia following surgery, possible conversion to open, and the risks of general anesthetic. Patient agreeable to surgery. All questions answered.     Addendum:  Esophageal manometry result reviewed. EGJ outflow obstruction maybe due to her large paraesophageal hernia. Given manometry findings, plan for paraesophageal hernia repair without fundoplication.

## 2018-08-09 NOTE — DISCHARGE INSTRUCTIONS
Patient Discharge Instructions    Tesfaye Mas / [id number] : 1940    Admitted 2018 Discharged: 2018         What to do at [address]: full liquid diet x 2 days, then soft diet x 1 week, then regular diet. Recommended activity: no heavy lifting > 20 lbs x 2 weeks; no driving while taking narcotics for pain. May take shower, but no bath x 2 weeks. Please take over the counter stool softener or miralax while taking narcotics for pain. If you experience trouble swallowing, a lot of drainage, develop redness around the wound, or a fever over 101 F occurs please call the office. Narcotics and anesthesia sometimes cause nausea and vomiting. If persistent please call the office. Do not hesitate to call with questions or concerns. Follow-up with Dr. Andrey Phoenix in 2 weeks. Please call [phone] for an appointment. Information obtained by :  I understand that if any problems occur once I am at home I am to contact my physician. I understand and acknowledge receipt of the instructions indicated above.                                                                                                                                                Physician's or R.N.'s Signature                                                                  Date/Time                                                                                                                                              Patient or Representative Signature                                                          Date/Time        DISCHARGE SUMMARY from Nurse    PATIENT INSTRUCTIONS:    After general anesthesia or intravenous sedation, for 24 hours or while taking prescription Narcotics:  · Limit your activities  · Do not drive and operate hazardous machinery  · Do not make important personal or business decisions  · Do  not drink alcoholic beverages  · If you have not urinated within 8 hours after discharge, please contact your surgeon on call. Report the following to your surgeon:  · Excessive pain, swelling, redness or odor of or around the surgical area  · Temperature over 100.5  · Nausea and vomiting lasting longer than 4 hours or if unable to take medications  · Any signs of decreased circulation or nerve impairment to extremity: change in color, persistent  numbness, tingling, coldness or increase pain  · Any questions    What to do at Home:    *  Please give a list of your current medications to your Primary Care Provider. *  Please update this list whenever your medications are discontinued, doses are      changed, or new medications (including over-the-counter products) are added. *  Please carry medication information at all times in case of emergency situations. These are general instructions for a healthy lifestyle:    No smoking/ No tobacco products/ Avoid exposure to second hand smoke  Surgeon General's Warning:  Quitting smoking now greatly reduces serious risk to your health. Obesity, smoking, and sedentary lifestyle greatly increases your risk for illness    A healthy diet, regular physical exercise & weight monitoring are important for maintaining a healthy lifestyle    You may be retaining fluid if you have a history of heart failure or if you experience any of the following symptoms:  Weight gain of 3 pounds or more overnight or 5 pounds in a week, increased swelling in our hands or feet or shortness of breath while lying flat in bed. Please call your doctor as soon as you notice any of these symptoms; do not wait until your next office visit. Recognize signs and symptoms of STROKE:    F-face looks uneven    A-arms unable to move or move unevenly    S-speech slurred or non-existent    T-time-call 911 as soon as signs and symptoms begin-DO NOT go       Back to bed or wait to see if you get better-TIME IS BRAIN.     Warning Signs of HEART ATTACK     Call 911 if you have these symptoms:   Chest discomfort. Most heart attacks involve discomfort in the center of the chest that lasts more than a few minutes, or that goes away and comes back. It can feel like uncomfortable pressure, squeezing, fullness, or pain.  Discomfort in other areas of the upper body. Symptoms can include pain or discomfort in one or both arms, the back, neck, jaw, or stomach.  Shortness of breath with or without chest discomfort.  Other signs may include breaking out in a cold sweat, nausea, or lightheadedness. Don't wait more than five minutes to call 911 - MINUTES MATTER! Fast action can save your life. Calling 911 is almost always the fastest way to get lifesaving treatment. Emergency Medical Services staff can begin treatment when they arrive -- up to an hour sooner than if someone gets to the hospital by car. The discharge information has been reviewed with the patient and caregiver. The patient and caregiver verbalized understanding. Discharge medications reviewed with the patient and caregiver and appropriate educational materials and side effects teaching were provided. ___________________________________________________________________________________________________________________________________      A common side effect of anesthesia following surgery is nausea and/or vomiting. In order to decrease symptoms, it is wise to avoid foods that are high in fat, greasy foods, milk products, and spicy foods for the first 24 hours. Acceptable foods for the first 24 hours following surgery include but are not limited to:     soup   broth    toast    crackers    applesauce    bananas    mashed potatoes,   soft or scrambled eggs   oatmeal    jello    It is important to eat when taking your pain medication. This will help to prevent nausea. If possible, please try to time your meals with your medications. It is very important to stay hydrated following surgery.  Sip fluids frequently while awake. Avoid acidic drinks such as citrus juices and soda for 24 hours. Carbonated beverages may cause bloating and gas. Acceptable fluids include:    - water (flavor packets may add variety)  - coffee or tea (in moderation)  - Gatorade  - Janes-aid  - apple juice  - cranberry juice    You are encouraged to cough and deep breathe every hour when awake. This will help to prevent respiratory complications following anesthesia. You may want to hug a pillow when coughing and sneezing to add additional support to the surgical area and to decrease discomfort if you had abdominal or chest surgery. If you are discharged home with support stockings, you may remove them after 24 hours. Support stockings are used to help prevent blood clots in the legs following surgery. Please take time to review all of your Home Care Instructions and Medication Information sheets provided in your discharge packet. If you have any questions, please contact your surgeons office. Thank you. How to Care for Your Wound After Its Treated With  DERMABOND* Topical Skin Adhesive  DERMABOND* Topical Skin Adhesive (2-octyl cyanoacrylate) is a sterile, liquid skin adhesive  that holds wound edges together. The film will usually remain in place for 5 to 10 days, then  naturally fall off your skin. The following will answer some of your questions and provide instructions for proper care for your  wound while it is healing:    CHECK WOUND APPEARANCE   Some swelling, redness, and pain are common with all wounds and normally will go away as the  wound heals. If swelling, redness, or pain increases or if the wound feels warm to the touch,  contact a doctor. Also contact a doctor if the wound edges reopen or separate. REPLACE BANDAGES   If your wound is bandaged, keep the bandage dry.    Replace the dressing daily until the adhesive film has fallen off or if the  bandage should become wet, unless otherwise instructed by your  physician.  When changing the dressing, do not place tape directly over the  DERMABOND adhesive film, because removing the tape later may also  remove the film. AVOID TOPICAL MEDICATIONS   Do not apply liquid or ointment medications or any other product to your wound while the  DERMABOND adhesive film is in place. These may loosen the film before your wound is healed. KEEP WOUND DRY AND PROTECTED   You may occasionally and briefly wet your wound in the shower or bath. Do not soak or scrub  your wound, do not swim, and avoid periods of heavy perspiration until the DERMABOND  adhesive has naturally fallen off. After showering or bathing, gently blot your wound dry with a  soft towel. If a protective dressing is being used, apply a fresh, dry bandage, being sure to keep  the tape off the DERMABOND adhesive film.  Apply a clean, dry bandage over the wound if necessary to protect it.  Protect your wound from injury until the skin has had sufficient time to heal.   Do not scratch, rub, or pick at the DERMABOND adhesive film. This may loosen the film before  your wound is healed.  Protect the wound from prolonged exposure to sunlight or tanning lamps while the film is in  place. If you have any questions or concerns about this product, please consult your doctor. *Trademark ©[employer] 2002    TO PREVENT AN INFECTION      1. [address] YOUR HANDS     To prevent infection, good handwashing is the most important thing you or your caregiver can do.  Wash your hands with soap and water or use the hand  we gave you before you touch any wounds. 2. SHOWER     Use the antibacterial soap we gave you when you take a shower.  Shower with this soap until your wounds are healed.  To reach all areas of your body, you may need someone to help you.  Dont forget to clean your belly button with every shower.     3.  USE CLEAN SHEETS     Use freshly cleaned sheets on your bed after surgery.  To keep the surgery site clean, do not allow pets to sleep with you while your wound is still healing. 4. STOP SMOKING     Stop smoking, or at least cut back on smoking     Smoking slows your healing. 5.  CONTROL YOUR BLOOD SUGAR     High blood sugars slow wound healing.  If you are diabetic, control your blood sugar levels before and after your surgery. Narcotic-Analgesic/Acetaminophen (Percocet, Norco, Lorcet HD, Lortab 10/325) - (By mouth)   Why this medicine is used:   Relieves pain. Contact a nurse or doctor right away if you have:  · Extreme weakness, shallow breathing, slow heartbeat  · Severe confusion, lightheadedness, dizziness, fainting  · Yellow skin or eyes, dark urine or pale stools  · Severe constipation, severe stomach pain, nausea, vomiting, loss of appetite  · Sweating or cold, clammy skin     Common side effects:  · Mild constipation, nausea, vomiting  · Sleepiness, tiredness  · Itching, rash  © 2017 [address] Information is for End User's use only and may not be sold, redistributed or otherwise used for commercial purposes.

## 2018-08-09 NOTE — PERIOP NOTES
TRANSFER - OUT REPORT:    Verbal report given to Miguel Ybarra (name) on Jhonny Means  being transferred to 2135(unit) for routine post - op       Report consisted of patients Situation, Background, Assessment and   Recommendations(SBAR). Information from the following report(s) SBAR, Kardex, OR Summary, Intake/Output, MAR and Recent Results was reviewed with the receiving nurse. Opportunity for questions and clarification was provided.       Patient transported with:   O2 @ 2 liters  Tech

## 2018-08-09 NOTE — OP NOTES
Patient ID:   Name: Nivia Bloomer Record Number: [id number]   YOB: 1940    Date of Surgery: 8/9/2018      Preoperative Diagnosis:     1. Paraesophageal hernia  2. GERD  3. Esophageal dysmotility    Postoperative Diagnosis:    Same as above    Procedures:  Davinci paraesophageal hernia repair with Bio-A    Surgeon: Clement Ivey MD      Anesthesia: General    Estimated Blood Loss: 20cc    Indications: Patient 65 y.o.  female was seen in ER on 7/21/18 with upper abdominal pain radiating to the back with nausea. CT scan in the ER showed extremely large hiatal hernia containing entire fundus and part of the gastric body.  The size of the hernia has increased since the last study.  This degree of gastric herniation introduces risk of gastric volvulus.  Mild hepatic steatosis.  Severe diverticulosis without diverticulitis.  Patient has been having intermittent post-prandial bloating and epigastric pain with dyspepsia.  Patient with chronic history of GERD and good symptomatic control with Nexium. Esophageal manometry showed all impedance bolus failure and borderline EGJ outflow obstruction. Given abnormal manometry and GERD symptoms well controlled on PPI, patient presents today for paraesophageal hernia repair without fundoplication. Procedure Details:  with the patient. The patient was taken to o perating room and placed in the supine position. After adequate general anesthesia was provided, the abdomen was then prepped and draped in the standard surgical fashion. After local anesthetics, small right upper quadrant incision was made and a 8 mm blunt trocar was placed under direct vision. CO2 pneumoperitoneum was then created. A 8 mm blunt trocar was placed supraumbilical and 12 mm Versastep trocar was placed in the right lower quadrant. Additional two 8 mm trocars were placed in the left upper quadrant.  Additional stab incision was made in the epigastric area and [facility] liver retractor was placed to expose the hiatus. The Venkat Cower was docked properly and instruments were introduced under direct vision. CO2 pneumoperitoneum was then created. Patient was found to have large size hiatal hernia. Phrenoesophageal membrane was divided and dissection continued toward the right adrienne of the diaphragm, reducing associated hiatal hernia. With the stomach retracted inferiorly and to the left, peritoneum off the abdominal surface and hiatal border of the right adrienne was dissected. Similarly, the left adrienne was dissected. Next, attention was then moved to the greater curvature of the stomach. The short gastric vessels were ligated to make room to bring up the mesh. Left aspect of the phrenoesophageal membrane was divided. The esophagus was now circumferentially mobilized at the hiatus. Once the stomach was was reduced and esophagus mobilized with exposure of the left and right adrienne, the crura was approximated posteriorly with running non-absorbable 0-0 VLoc suture x 2. The hernia repair with reinforced with 7 x 10 cm Bio-A. This was secured to the hiatus using interrupted 2-0 Vicryl. Patient noted to have small gastric serosal tear near the greater curvature and this was oversewn with interrupted 3-0 Silk Lambert stitches. Hemostasis was good. Trocars were removed and CO2 pneumoperitoneum was released. The 12 mm trocar site fascial defect was closed with figure of eight 0-0 Vicryl. Skin was reapproximated using 4-0 Monocryl subcuticular followed by Dermabond. Instrument, sponge, and needle counts were correct prior to closure and at the conclusion of the case. The patient was taken to recovery room in good condition having tolerated the procedure well.      CC:  Rekha Lira MD

## 2018-08-09 NOTE — IP AVS SNAPSHOT
[address] [facility] 
[phone] Patient: Merlin Sarks MRN: [id number] :1940 About your hospitalization You were admitted on:  2018 You last received care in the:  [facility] 2 GENERAL SURGERY You were discharged on:  2018 Why you were hospitalized Your primary diagnosis was:  Not on File Your diagnoses also included:  Hiatal Hernia With Chemo [place] Follow-up Information Follow up With Details Comments Contact Info MD Felipe Kilgoreilla [address] [facility] 
[phone] Your Scheduled Appointments 2018  2:20 PM EDT  
POST OP with Antione Fofana MD  
[facility] Dr. Laci Rebolledo [place] [address], [address] [place] [address]  
[phone] Discharge Orders None A check denis indicates which time of day the medication should be taken. My Medications START taking these medications Instructions Each Dose to Equal  
 Morning Noon Evening Bedtime HYDROcodone-acetaminophen 5-325 mg per tablet Commonly known as:  Gunjan Punt Take 1 Tab by mouth every four (4) hours as needed for Pain. Max Daily Amount: 6 Tabs. 1 Tab CONTINUE taking these medications Instructions Each Dose to Equal  
 Morning Noon Evening Bedtime  
 aspirin 81 mg tablet Take  by mouth daily. atenolol 50 mg tablet Commonly known as:  TENORMIN Your last dose was:  50 mg on 2018  6:49 AM  
   
 Take 50 mg by mouth every morning. 50 mg  
    
   
   
   
  
 atorvastatin 10 mg tablet Commonly known as:  LIPITOR Take 10 mg by mouth nightly. 10 mg  
    
   
   
   
  
 CITRACAL PO Take  by mouth two (2) times a day. doxepin 25 mg capsule Commonly known as:  SINEquan Take 25 mg by mouth nightly. 25 mg  
    
   
   
   
  
 fexofenadine 180 mg tablet Commonly known as:  Wandalee White Take 180 mg by mouth nightly. 180 mg  
    
   
   
   
  
 FIBERCON PO Take  by mouth two (2) times a day. KLOR-CON M20 20 mEq tablet Generic drug:  potassium chloride Take 20 mEq by mouth two (2) times a day. 20 mEq  
    
   
   
   
  
 metFORMIN 500 mg tablet Commonly known as:  GLUCOPHAGE Your last dose was:  500 mg on 8/11/2018  9:34 AM  
   
 Take 500 mg by mouth daily (with breakfast). 500 mg NexIUM 40 mg capsule Generic drug:  esomeprazole Take 40 mg by mouth every morning. 40 mg  
    
   
   
   
  
 olmesartan 20 mg tablet Commonly known as:  [employer] Take 20 mg by mouth nightly. 20 mg  
    
   
   
   
  
 risedronate 35 mg tablet Commonly known as:  ACTONEL Take 35 mg by mouth every Tuesday. 35 mg  
    
   
   
   
  
 VITAMIN B-12 500 mcg tablet Generic drug:  cyanocobalamin Take 500 mcg by mouth every morning. 500 mcg VITAMIN D2 PO Take 1,000 Units by mouth nightly. 1000 Units Where to Get Your Medications Information on where to get these meds will be given to you by the nurse or doctor. ! Ask your nurse or doctor about these medications HYDROcodone-acetaminophen 5-325 mg per tablet Opioid Education Prescription Opioids: What You Need to Know: 
 
Prescription opioids can be used to help relieve moderate-to-severe pain and are often prescribed following a surgery or injury, or for certain health conditions. These medications can be an important part of treatment but also come with serious risks. Opioids are strong pain medicines.  Examples include hydrocodone, oxycodone, fentanyl, and morphine. Heroin is an example of an illegal opioid. It is important to work with your health care provider to make sure you are getting the safest, most effective care. WHAT ARE THE RISKS AND SIDE EFFECTS OF OPIOID USE? Prescription opioids carry serious risks of addiction and overdose, especially with prolonged use. An opioid overdose, often marked by slow breathing, can cause sudden death. The use of prescription opioids can have a number of side effects as well, even when taken as directed. · Tolerance-meaning you might need to take more of a medication for the same pain relief · Physical dependence-meaning you have symptoms of withdrawal when the medication is stopped. Withdrawal symptoms can include nausea, sweating, chills, diarrhea, stomach cramps, and muscle aches. Withdrawal can last up to several weeks, depending on which drug you took and how long you took it. · Increased sensitivity to pain · Constipation · Nausea, vomiting, and dry mouth · Sleepiness and dizziness · Confusion · Depression · Low levels of testosterone that can result in lower sex drive, energy, and strength · Itching and sweating RISKS ARE GREATER WITH:      
· History of drug misuse, substance use disorder, or overdose · Mental health conditions (such as depression or anxiety) · Sleep apnea · Older age (72 years or older) · Pregnancy Avoid alcohol while taking prescription opioids. Also, unless specifically advised by your health care provider, medications to avoid include: · Benzodiazepines (such as Xanax or Valium) · Muscle relaxants (such as Soma or Flexeril) · Hypnotics (such as Ambien or Lunesta) · Other prescription opioids KNOW YOUR OPTIONS Talk to your health care provider about ways to manage your pain that don't involve prescription opioids. Some of these options may actually work better and have fewer risks and side effects. Options may include: · Pain relievers such as acetaminophen, ibuprofen, and naproxen · Some medications that are also used for depression or seizures · Physical therapy and exercise · Counseling to help patients learn how to cope better with triggers of pain and stress. · Application of heat or cold compress · Massage therapy · Relaxation techniques Be Informed Make sure you know the name of your medication, how much and how often to take it, and its potential risks & side effects. IF YOU ARE PRESCRIBED OPIOIDS FOR PAIN: 
· Never take opioids in greater amounts or more often than prescribed. Remember the goal is not to be pain-free but to manage your pain at a tolerable level. · Follow up with your primary care provider to: · Work together to create a plan on how to manage your pain. · Talk about ways to help manage your pain that don't involve prescription opioids. · Talk about any and all concerns and side effects. · Help prevent misuse and abuse. · Never sell or share prescription opioids · Help prevent misuse and abuse. · Store prescription opioids in a secure place and out of reach of others (this may include visitors, children, friends, and family). · Safely dispose of unused/unwanted prescription opioids: Find your community drug take-back program or your pharmacy mail-back program, or flush them down the toilet, following guidance from the Food and Drug Administration (www.fda.gov/Drugs/ResourcesForYou). · Visit www.cdc.gov/drugoverdose to learn about the risks of opioid abuse and overdose. · If you believe you may be struggling with addiction, tell your health care provider and ask for guidance or call [address] [place], [place] [address] [facility] at 2-162-127-TXER. Discharge Instructions Patient Discharge Instructions Kevin Dwyer / [id number] : 1940 Admitted 2018 Discharged: 2018 [facility] to do at [facility] Recommended diet: full liquid diet x 2 days, then soft diet x 1 week, then regular diet. Recommended activity: no heavy lifting > 20 lbs x 2 weeks; no driving while taking narcotics for pain. May take shower, but no bath x 2 weeks. Please take over the counter stool softener or miralax while taking narcotics for pain. If you experience trouble swallowing, a lot of drainage, develop redness around the wound, or a fever over 101 F occurs please call the office. Narcotics and anesthesia sometimes cause nausea and vomiting. If persistent please call the office. Do not hesitate to call with questions or concerns. Follow-up with Dr. Ericka Knight in 2 weeks. Please call [phone] for an appointment. Information obtained by : 
I understand that if any problems occur once I am at home I am to contact my physician. I understand and acknowledge receipt of the instructions indicated above. Physician's or R.N.'s Signature                                                                  Date/Time Patient or Representative Signature                                                          Date/Time DISCHARGE SUMMARY from Nurse PATIENT INSTRUCTIONS: 
 
After general anesthesia or intravenous sedation, for 24 hours or while taking prescription Narcotics: · Limit your activities · Do not drive and operate hazardous machinery · Do not make important personal or business decisions · Do  not drink alcoholic beverages · If you have not urinated within 8 hours after discharge, please contact your surgeon on call. Report the following to your surgeon: · Excessive pain, swelling, redness or odor of or around the surgical area · Temperature over 100.5 · Nausea and vomiting lasting longer than 4 hours or if unable to take medications · Any signs of decreased circulation or nerve impairment to extremity: change in color, persistent  numbness, tingling, coldness or increase pain · Any questions What to do at Home: *  Please give a list of your current medications to your Primary Care Provider. *  Please update this list whenever your medications are discontinued, doses are 
    changed, or new medications (including over-the-counter products) are added. *  Please carry medication information at all times in case of emergency situations. These are general instructions for a healthy lifestyle: No smoking/ No tobacco products/ Avoid exposure to second hand smoke Surgeon General's Warning:  Quitting smoking now greatly reduces serious risk to your health. Obesity, smoking, and sedentary lifestyle greatly increases your risk for illness A healthy diet, regular physical exercise & weight monitoring are important for maintaining a healthy lifestyle You may be retaining fluid if you have a history of heart failure or if you experience any of the following symptoms:  Weight gain of 3 pounds or more overnight or 5 pounds in a week, increased swelling in our hands or feet or shortness of breath while lying flat in bed. Please call your doctor as soon as you notice any of these symptoms; do not wait until your next office visit. Recognize signs and symptoms of STROKE: 
 
 
? soup 
? broth 
?  toast  
? crackers ? applesauce 
? bananas  
? mashed potatoes, 
? soft or scrambled eggs 
? oatmeal 
?  jello It is important to eat when taking your pain medication. This will help to prevent nausea. If possible, please try to time your meals with your medications. It is very important to stay hydrated following surgery. Sip fluids frequently while awake.  Avoid acidic drinks such as citrus juices and soda for 24 hours. Carbonated beverages may cause bloating and gas. Acceptable fluids include: 
 
? water (flavor packets may add variety) ? coffee or tea (in moderation) ? Gatorade ? Wolf Diones ? apple juice 
? cranberry juice You are encouraged to cough and deep breathe every hour when awake. This will help to prevent respiratory complications following anesthesia. You may want to hug a pillow when coughing and sneezing to add additional support to the surgical area and to decrease discomfort if you had abdominal or chest surgery. If you are discharged home with support stockings, you may remove them after 24 hours. Support stockings are used to help prevent blood clots in the legs following surgery. Please take time to review all of your Home Care Instructions and Medication Information sheets provided in your discharge packet. If you have any questions, please contact your surgeons office. Thank you. How to Care for Your Wound After Its Treated With DERMABOND* Topical Skin Adhesive DERMABOND* Topical Skin Adhesive (2-octyl cyanoacrylate) is a sterile, liquid skin adhesive 
that holds wound edges together. The film will usually remain in place for 5 to 10 days, then 
naturally fall off your skin. The following will answer some of your questions and provide instructions for proper care for your 
wound while it is healing: CHECK WOUND APPEARANCE 
 Some swelling, redness, and pain are common with all wounds and normally will go away as the 
wound heals. If swelling, redness, or pain increases or if the wound feels warm to the touch, 
contact a doctor. Also contact a doctor if the wound edges reopen or separate. REPLACE BANDAGES 
 If your wound is bandaged, keep the bandage dry.  Replace the dressing daily until the adhesive film has fallen off or if the 
bandage should become wet, unless otherwise instructed by your 
physician.  When changing the dressing, do not place tape directly over the DERMABOND adhesive film, because removing the tape later may also 
remove the film. AVOID TOPICAL MEDICATIONS  Do not apply liquid or ointment medications or any other product to your wound while the DERMABOND adhesive film is in place. These may loosen the film before your wound is healed. KEEP WOUND DRY AND PROTECTED  You may occasionally and briefly wet your wound in the shower or bath. Do not soak or scrub 
your wound, do not swim, and avoid periods of heavy perspiration until the DERMABOND 
adhesive has naturally fallen off. After showering or bathing, gently blot your wound dry with a 
soft towel. If a protective dressing is being used, apply a fresh, dry bandage, being sure to keep 
the tape off the DERMABOND adhesive film.  Apply a clean, dry bandage over the wound if necessary to protect it.  Protect your wound from injury until the skin has had sufficient time to heal. 
 Do not scratch, rub, or pick at the DERMABOND adhesive film. This may loosen the film before 
your wound is healed.  Protect the wound from prolonged exposure to sunlight or tanning lamps while the film is in 
place. If you have any questions or concerns about this product, please consult your doctor. *Trademark ©[employer], [employer] 2002 TO PREVENT AN INFECTION 1. [address] YOUR HANDS 
 
? To prevent infection, good handwashing is the most important thing you or your caregiver can do.   
 
? Wash your hands with soap and water or use the hand  we gave you before you touch any wounds. 2. SHOWER ? Use the antibacterial soap we gave you when you take a shower. ? Shower with this soap until your wounds are healed. ? To reach all areas of your body, you may need someone to help you. ? Dont forget to clean your belly button with every shower. 3.  USE CLEAN SHEETS 
 
? Use freshly cleaned sheets on your bed after surgery. ? To keep the surgery site clean, do not allow pets to sleep with you while your wound is still healing. 4. STOP SMOKING ? Stop smoking, or at least cut back on smoking ? Smoking slows your healing. 5.  CONTROL YOUR BLOOD SUGAR 
 
? High blood sugars slow wound healing. ? If you are diabetic, control your blood sugar levels before and after your surgery. Narcotic-Analgesic/Acetaminophen (Percocet, Norco, Lorcet HD, Lortab 10/325) - (By mouth) Why this medicine is used:  
Relieves pain. Contact a nurse or doctor right away if you have: 
· Extreme weakness, shallow breathing, slow heartbeat · Severe confusion, lightheadedness, dizziness, fainting · Yellow skin or eyes, dark urine or pale stools · Severe constipation, severe stomach pain, nausea, vomiting, loss of appetite · Sweating or cold, clammy skin Common side effects: · Mild constipation, nausea, vomiting · Sleepiness, tiredness · Itching, rash © 2017 [facility] Information is for End User's use only and may not be sold, redistributed or otherwise used for commercial purposes. SinCola Announcement We are excited to announce that we are making your provider's discharge notes available to you in SinCola. You will see these notes when they are completed and signed by the physician that discharged you from your recent hospital stay. If you have any questions or concerns about any information you see in SinCola, please call the Health Information Department where you were seen or reach out to your Primary Care Provider for more information about your plan of care. Introducing [facility]! [facility] introduces SinCola patient portal. Now you can access parts of your medical record, email your doctor's office, and request medication refills online. 1. In your internet browser, go to [email or web address] 2. Click on the First Time User? Click Here link in the Sign In box. You will see the New Member Sign Up page. 3. Enter your ImpactFlo Access Code exactly as it appears below. You will not need to use this code after youve completed the sign-up process. If you do not sign up before the expiration date, you must request a new code. · ImpactFlo Access Code: [email or web address] Expires: 10/19/2018 10:10 AM 
 
4. Enter the last four digits of your Social Security Number (xxxx) and Date of Birth (mm/dd/yyyy) as indicated and click Submit. You will be taken to the next sign-up page. 5. Create a ImpactFlo ID. This will be your ImpactFlo login ID and cannot be changed, so think of one that is secure and easy to remember. 6. Create a ImpactFlo password. You can change your password at any time. 7. Enter your Password Reset Question and Answer. This can be used at a later time if you forget your password. 8. Enter your e-mail address. You will receive e-mail notification when new information is available in [address] 9. Click Sign Up. You can now view and download portions of your medical record. 10. Click the Download Summary menu link to download a portable copy of your medical information. If you have questions, please visit the Frequently Asked Questions section of the ImpactFlo website. Remember, ImpactFlo is NOT to be used for urgent needs. For medical emergencies, dial 911. Now available from your iPhone and Android! Introducing Hany Valle As a [facility]bert Cabral patient, I wanted to make you aware of our electronic visit tool called Hany Valle. [facility] 24/7 allows you to connect within minutes with a medical provider 24 hours a day, seven days a week via a mobile device or tablet or logging into a secure website from your computer. You can access Hany Valle from anywhere in the [place].  
 
A virtual visit might be right for you when you have a simple condition and feel like you just dont want to get out of bed, or cant get away from work for an appointment, when your regular [employer] provider is not available (evenings, weekends or holidays), or when youre out of town and need minor care. Electronic visits cost only $49 and if the [employer] 24/7 provider determines a prescription is needed to treat your condition, one can be electronically transmitted to a nearby pharmacy*. Please take a moment to enroll today if you have not already done so. The enrollment process is free and takes just a few minutes. To enroll, please download the [employer] 24/7 mary to your tablet or phone, or visit [email or web address] to enroll on your computer. And, as an [address] [place] [address] patient with a Digitwhiz account, the results of your visits will be scanned into your electronic medical record and your primary care provider will be able to view the scanned results. We urge you to continue to see your regular [employer] provider for your ongoing medical care. And while your primary care provider may not be the one available when you seek a [employer] virtual visit, the peace of mind you get from getting a real diagnosis real time can be priceless. For more information on [employer], view our Frequently Asked Questions (FAQs) at [email or web address]. Sincerely, 
 
Romario Miles MD 
Chief Medical Officer Jorden Pantoja *:  certain medications cannot be prescribed via RebelMouse Providers Seen During Your Hospitalization Provider Specialty Primary office phone Maggie Escobar MD General Surgery [phone] Your Primary Care Physician (PCP) Primary Care Physician Office Phone Office Fax Wiley Barnett [phone] You are allergic to the following Allergen Reactions Penicillins Rash  Unsure of reaction, has been 20yrs since given and cannot remember extent of allergy, but has been avoiding this drug class Recent Documentation Height Weight Breastfeeding? BMI OB Status Smoking Status 1.549 m 62.3 kg No 25.95 kg/m2 Postmenopausal Never Smoker Emergency Contacts Name Discharge Info Relation Home Work Mobile Kavya Mcgregor DISCHARGE CAREGIVER [3] Daughter [21]   [phone] [address] [place] [address] CAREGIVER [3] Daughter [21]   [phone] Patient Belongings The following personal items are in your possession at time of discharge: 
  Dental Appliances: None  Visual Aid: Glasses, At bedside      Home Medications: None   Jewelry: None  Clothing: Pants, Shirt, Undergarments, Footwear (no bra )    Other Valuables: Purse (purse with daughter ) Please provide this summary of care documentation to your next provider. Signatures-by signing, you are acknowledging that this After Visit Summary has been reviewed with you and you have received a copy. Patient Signature:  ____________________________________________________________ Date:  ____________________________________________________________  
  
Marichuy Nam Provider Signature:  ____________________________________________________________ Date:  ____________________________________________________________

## 2018-08-09 NOTE — PERIOP NOTES
Handoff Report from Operating Room to PACU    Report received from Christy White RN  and Jack Brasher CRNA  regarding Slim Barnett. Surgeon(s):  Tamy Paul MD  And Procedure(s) (LRB):  DAVINCI MARISABEL-ESOPHAGEAL HERNIA REPAIR WITH FUNDOPLICATION (N/A)  confirmed   with allergies and dressings discussed. Anesthesia type, drugs, patient history, complications, estimated blood loss, vital signs, intake and output, and last pain medication and reversal medications were reviewed.

## 2018-08-09 NOTE — ANESTHESIA PREPROCEDURE EVALUATION
Anesthetic History   No history of anesthetic complications            Review of Systems / Medical History  Patient summary reviewed, nursing notes reviewed and pertinent labs reviewed    Pulmonary  Within defined limits                 Neuro/Psych   Within defined limits           Cardiovascular  Within defined limits  Hypertension              Exercise tolerance: >4 METS     GI/Hepatic/Renal  Within defined limits   GERD           Endo/Other  Within defined limits  Diabetes         Other Findings   Comments: Hx CLL           Physical Exam    Airway  Mallampati: III  TM Distance: 4 - 6 cm  Neck ROM: normal range of motion   Mouth opening: Normal     Cardiovascular  Regular rate and rhythm,  S1 and S2 normal,  no murmur, click, rub, or gallop             Dental  No notable dental hx       Pulmonary  Breath sounds clear to auscultation               Abdominal  GI exam deferred       Other Findings            Anesthetic Plan    ASA: 3  Anesthesia type: general    Monitoring Plan: BIS      Induction: Intravenous  Anesthetic plan and risks discussed with: Patient

## 2018-08-10 LAB
ANION GAP SERPL CALC-SCNC: 11 MMOL/L (ref 5–15)
BUN SERPL-MCNC: 9 MG/DL (ref 6–20)
BUN/CREAT SERPL: 12 (ref 12–20)
CALCIUM SERPL-MCNC: 8.6 MG/DL (ref 8.5–10.1)
CHLORIDE SERPL-SCNC: 98 MMOL/L (ref 97–108)
CO2 SERPL-SCNC: 22 MMOL/L (ref 21–32)
CREAT SERPL-MCNC: 0.74 MG/DL (ref 0.55–1.02)
ERYTHROCYTE [DISTWIDTH] IN BLOOD BY AUTOMATED COUNT: 13.2 % (ref 11.5–14.5)
GLUCOSE BLD STRIP.AUTO-MCNC: 108 MG/DL (ref 65–100)
GLUCOSE BLD STRIP.AUTO-MCNC: 108 MG/DL (ref 65–100)
GLUCOSE BLD STRIP.AUTO-MCNC: 118 MG/DL (ref 65–100)
GLUCOSE BLD STRIP.AUTO-MCNC: 138 MG/DL (ref 65–100)
GLUCOSE SERPL-MCNC: 129 MG/DL (ref 65–100)
HCT VFR BLD AUTO: 40.3 % (ref 35–47)
HGB BLD-MCNC: 14.1 G/DL (ref 11.5–16)
MCH RBC QN AUTO: 30.3 PG (ref 26–34)
MCHC RBC AUTO-ENTMCNC: 35 G/DL (ref 30–36.5)
MCV RBC AUTO: 86.5 FL (ref 80–99)
NRBC # BLD: 0 K/UL (ref 0–0.01)
NRBC BLD-RTO: 0 PER 100 WBC
PLATELET # BLD AUTO: 151 K/UL (ref 150–400)
PMV BLD AUTO: 10.6 FL (ref 8.9–12.9)
POTASSIUM SERPL-SCNC: 4.1 MMOL/L (ref 3.5–5.1)
RBC # BLD AUTO: 4.66 M/UL (ref 3.8–5.2)
SERVICE CMNT-IMP: ABNORMAL
SODIUM SERPL-SCNC: 131 MMOL/L (ref 136–145)
WBC # BLD AUTO: 13.2 K/UL (ref 3.6–11)

## 2018-08-10 PROCEDURE — 36415 COLL VENOUS BLD VENIPUNCTURE: CPT | Performed by: SURGERY

## 2018-08-10 PROCEDURE — 74011250636 HC RX REV CODE- 250/636: Performed by: SURGERY

## 2018-08-10 PROCEDURE — 85027 COMPLETE CBC AUTOMATED: CPT | Performed by: SURGERY

## 2018-08-10 PROCEDURE — 82962 GLUCOSE BLOOD TEST: CPT

## 2018-08-10 PROCEDURE — 74011250637 HC RX REV CODE- 250/637: Performed by: SURGERY

## 2018-08-10 PROCEDURE — 77010033678 HC OXYGEN DAILY

## 2018-08-10 PROCEDURE — 99218 HC RM OBSERVATION: CPT

## 2018-08-10 PROCEDURE — 80048 BASIC METABOLIC PNL TOTAL CA: CPT | Performed by: SURGERY

## 2018-08-10 RX ORDER — HEPARIN SODIUM 5000 [USP'U]/ML
5000 INJECTION, SOLUTION INTRAVENOUS; SUBCUTANEOUS EVERY 12 HOURS
Status: DISCONTINUED | OUTPATIENT
Start: 2018-08-10 | End: 2018-08-11 | Stop reason: HOSPADM

## 2018-08-10 RX ORDER — ACETAMINOPHEN 325 MG/1
650 TABLET ORAL EVERY 6 HOURS
Status: DISCONTINUED | OUTPATIENT
Start: 2018-08-10 | End: 2018-08-11 | Stop reason: HOSPADM

## 2018-08-10 RX ADMIN — Medication 10 ML: at 22:00

## 2018-08-10 RX ADMIN — CLINDAMYCIN PHOSPHATE 600 MG: 12 INJECTION, SOLUTION INTRAVENOUS at 05:30

## 2018-08-10 RX ADMIN — ACETAMINOPHEN 1000 MG: 10 INJECTION, SOLUTION INTRAVENOUS at 11:25

## 2018-08-10 RX ADMIN — ONDANSETRON 4 MG: 2 INJECTION INTRAMUSCULAR; INTRAVENOUS at 18:34

## 2018-08-10 RX ADMIN — HEPARIN SODIUM 5000 UNITS: 5000 INJECTION, SOLUTION INTRAVENOUS; SUBCUTANEOUS at 21:59

## 2018-08-10 RX ADMIN — HYDRALAZINE HYDROCHLORIDE 20 MG: 20 INJECTION INTRAMUSCULAR; INTRAVENOUS at 03:57

## 2018-08-10 RX ADMIN — Medication 10 ML: at 05:30

## 2018-08-10 RX ADMIN — ACETAMINOPHEN 1000 MG: 10 INJECTION, SOLUTION INTRAVENOUS at 04:07

## 2018-08-10 RX ADMIN — OXYCODONE HYDROCHLORIDE 5 MG: 5 TABLET ORAL at 08:33

## 2018-08-10 RX ADMIN — OXYCODONE HYDROCHLORIDE 5 MG: 5 TABLET ORAL at 15:24

## 2018-08-10 RX ADMIN — OXYCODONE HYDROCHLORIDE 5 MG: 5 TABLET ORAL at 20:02

## 2018-08-10 RX ADMIN — ACETAMINOPHEN 650 MG: 325 TABLET ORAL at 17:05

## 2018-08-10 RX ADMIN — LOSARTAN POTASSIUM 50 MG: 50 TABLET ORAL at 21:59

## 2018-08-10 RX ADMIN — SODIUM CHLORIDE AND POTASSIUM CHLORIDE: 4.5; 1.49 INJECTION, SOLUTION INTRAVENOUS at 08:46

## 2018-08-10 RX ADMIN — ONDANSETRON 4 MG: 2 INJECTION INTRAMUSCULAR; INTRAVENOUS at 04:50

## 2018-08-10 RX ADMIN — METFORMIN HYDROCHLORIDE 500 MG: 500 TABLET, FILM COATED ORAL at 08:33

## 2018-08-10 RX ADMIN — MORPHINE SULFATE 2 MG: 2 INJECTION, SOLUTION INTRAMUSCULAR; INTRAVENOUS at 06:20

## 2018-08-10 RX ADMIN — ATENOLOL 50 MG: 50 TABLET ORAL at 08:33

## 2018-08-10 RX ADMIN — HYDRALAZINE HYDROCHLORIDE 20 MG: 20 INJECTION INTRAMUSCULAR; INTRAVENOUS at 17:05

## 2018-08-10 RX ADMIN — PANTOPRAZOLE SODIUM 40 MG: 40 TABLET, DELAYED RELEASE ORAL at 08:33

## 2018-08-10 NOTE — PROGRESS NOTES
Problem: Surgical Pathway Post-Op Day 1  Goal: Nutrition/Diet  Outcome: Progressing Towards Goal  Diet advan zurdo to full liquids, pt has decreased appetite but is tolerating otherwise  Goal: Respiratory  Outcome: Progressing Towards Goal  Pt on room air

## 2018-08-10 NOTE — PROGRESS NOTES
Nutrition:  Consult received, chart reviewed. Provided written and verbal education on Nissen Fundoplication diet. No questions at this time. Left my contact info in case she should have any questions or concerns after discharge. Thank you!     Kiran, [address] [place]   Pager [phone]

## 2018-08-10 NOTE — PROGRESS NOTES
SURGERY PROGRESS NOTE      Admit Date: 2018    POD 1 Day Post-Op    Procedure: Procedure(s):  DAVINCI MARISABEL-ESOPHAGEAL HERNIA REPAIR WITH FUNDOPLICATION      Subjective:     Complaining of nausea. Tolerating full liquid. No emesis. Also complaining of left shoulder pain. Objective:     Visit Vitals    /74 (BP 1 Location: Right arm, BP Patient Position: At rest)    Pulse 65    Temp 97.5 °F (36.4 °C)    Resp 18    Ht 5' 1\" (1.549 m)    Wt 62.3 kg (137 lb 5.6 oz)    SpO2 97%    Breastfeeding No    BMI 25.95 kg/m2        Temp (24hrs), Av.5 °F (36.4 °C), Min:97.3 °F (36.3 °C), Max:98.1 °F (36.7 °C)      Physical Exam:     Abdomen:  Soft. Non-distended. Incision C/D/I. Lab Results  Component Value Date/Time   WBC 13.2 (H) 08/10/2018 05:34 AM   HGB 14.1 08/10/2018 05:34 AM   Hemoglobin (POC) 13.3 2010 02:51 PM   HCT 40.3 08/10/2018 05:34 AM   Hematocrit (POC) 39 2010 02:51 PM   PLATELET 354  05:34 AM   MCV 86.5 08/10/2018 05:34 AM     Lab Results  Component Value Date/Time   GFR est non-AA >60 08/10/2018 05:34 AM   GFRNA, POC >60 2010 02:51 PM   GFR est AA >60 08/10/2018 05:34 AM   GFRAA, POC >60 2010 02:51 PM   Creatinine 0.74 08/10/2018 05:34 AM   Creatinine (POC) 0.6 2010 02:51 PM   BUN 9 08/10/2018 05:34 AM   BUN (POC) 4 (L) 2010 02:51 PM   Sodium 131 (L) 08/10/2018 05:34 AM   Sodium (POC) 130 (L) 2010 02:51 PM   Potassium 4.1 08/10/2018 05:34 AM   Potassium (POC) 4.4 2010 02:51 PM   Chloride 98 08/10/2018 05:34 AM   Chloride (POC) 94 (L) 2010 02:51 PM   CO2 22 08/10/2018 05:34 AM   Magnesium 1.2 (L) 09/15/2009 03:05 AM   Phosphorus 2.9 09/15/2009 03:05 AM       Assessment:     Active Problems:    Hiatal hernia with GERD (2018)        Plan/Recommendations/Medical Decision Making:     Keep another day. Plan for discharge in AM if nausea better.

## 2018-08-11 VITALS
HEART RATE: 88 BPM | DIASTOLIC BLOOD PRESSURE: 82 MMHG | BODY MASS INDEX: 25.93 KG/M2 | SYSTOLIC BLOOD PRESSURE: 162 MMHG | RESPIRATION RATE: 16 BRPM | HEIGHT: 61 IN | WEIGHT: 137.35 LBS | OXYGEN SATURATION: 95 % | TEMPERATURE: 97.3 F

## 2018-08-11 LAB
GLUCOSE BLD STRIP.AUTO-MCNC: 87 MG/DL (ref 65–100)
SERVICE CMNT-IMP: NORMAL

## 2018-08-11 PROCEDURE — 99218 HC RM OBSERVATION: CPT

## 2018-08-11 PROCEDURE — 82962 GLUCOSE BLOOD TEST: CPT

## 2018-08-11 PROCEDURE — 74011250637 HC RX REV CODE- 250/637: Performed by: SURGERY

## 2018-08-11 PROCEDURE — 74011250636 HC RX REV CODE- 250/636: Performed by: SURGERY

## 2018-08-11 RX ADMIN — HEPARIN SODIUM 5000 UNITS: 5000 INJECTION, SOLUTION INTRAVENOUS; SUBCUTANEOUS at 09:34

## 2018-08-11 RX ADMIN — ATENOLOL 50 MG: 50 TABLET ORAL at 06:49

## 2018-08-11 RX ADMIN — METFORMIN HYDROCHLORIDE 500 MG: 500 TABLET, FILM COATED ORAL at 09:34

## 2018-08-11 RX ADMIN — ACETAMINOPHEN 650 MG: 325 TABLET ORAL at 00:45

## 2018-08-11 RX ADMIN — PANTOPRAZOLE SODIUM 40 MG: 40 TABLET, DELAYED RELEASE ORAL at 06:49

## 2018-08-11 RX ADMIN — ACETAMINOPHEN 650 MG: 325 TABLET ORAL at 06:45

## 2018-08-11 RX ADMIN — Medication 10 ML: at 06:45

## 2018-08-11 NOTE — DISCHARGE SUMMARY
Physician Discharge Summary     Patient ID:  Colton Soliman  [id number]  18 y.o.  1940    Admit Date: 8/9/2018    Discharge Date: 8/11/2018    Admission Diagnoses: HIATAL HERNIA;Hiatal hernia with GERD    Discharge Diagnoses: Active Problems:    Hiatal hernia with GERD (8/9/2018)         Admission Condition: Good    Discharge Condition: Good    Last Procedure: Procedure(s):  [place] Course:   Normal hospital course for this procedure. Consults: None    Disposition: home    Patient Instructions:   Current Discharge Medication List      START taking these medications    Details   HYDROcodone-acetaminophen (NORCO) 5-325 mg per tablet Take 1 Tab by mouth every four (4) hours as needed for Pain. Max Daily Amount: 6 Tabs. Qty: 40 Tab, Refills: 0    Associated Diagnoses: Hiatal hernia with GERD         CONTINUE these medications which have NOT CHANGED    Details   atenolol (TENORMIN) 50 mg tablet Take 50 mg by mouth every morning. cyanocobalamin (VITAMIN B-12) 500 mcg tablet Take 500 mcg by mouth every morning. olmesartan (BENICAR) 20 mg tablet Take 20 mg by mouth nightly. doxepin (SINEQUAN) 25 mg capsule Take 25 mg by mouth nightly. metFORMIN (GLUCOPHAGE) 500 mg tablet Take 500 mg by mouth daily (with breakfast). potassium chloride (KLOR-CON M20) 20 mEq tablet Take 20 mEq by mouth two (2) times a day. esomeprazole (NEXIUM) 40 mg capsule Take 40 mg by mouth every morning. ERGOCALCIFEROL (VITAMIN D PO) Take 1,000 Units by mouth nightly. risedronate (ACTONEL) 35 mg tablet Take 35 mg by mouth every Tuesday. atorvastatin (LIPITOR) 10 mg tablet Take 10 mg by mouth nightly. fexofenadine (ALLEGRA) 180 mg tablet Take 180 mg by mouth nightly. CALCIUM POLYCARBOPHIL (FIBERCON PO) Take  by mouth two (2) times a day. CALCIUM CITRATE (CITRACAL PO) Take  by mouth two (2) times a day.       aspirin 81 mg tablet Take  by mouth daily. Activity: No driving while on analgesics and No heavy lifting for 4 weeks  Diet: full liquids  Wound Care: Keep wound clean and dry    Follow-up with Dr Sheela Owen in 1 week.   Follow-up tests/labs none    Signed:  Asael Watkins MD  [facility]  8/11/2018  9:43 AM

## 2018-08-11 NOTE — PROGRESS NOTES
General Surgery End of Shift Nursing Note    Bedside shift change report given to Dawit Beckett (oncoming nurse) by Jerrica Alvarez (offgoing nurse). Report included the following information SBAR, Kardex, Intake/Output, MAR and Recent Results. Shift worked:   7a-7p   Summary of shift:    appettite decreased and no flatus but good active bowel sounds. Pt states she feels terrible and her face is flushed. Medicated for nausea, and PO pain meds administered. Left shoulder pain persists. Using IS and ambulating frequently in room but has refused hallway ambulation thus far.     Issues for physician to address:   none     [address]

## 2018-08-31 ENCOUNTER — OFFICE VISIT (OUTPATIENT)
Dept: SURGERY | Age: 78
End: 2018-08-31

## 2018-08-31 VITALS
RESPIRATION RATE: 16 BRPM | HEART RATE: 78 BPM | DIASTOLIC BLOOD PRESSURE: 97 MMHG | HEIGHT: 61 IN | TEMPERATURE: 97.6 F | OXYGEN SATURATION: 98 % | SYSTOLIC BLOOD PRESSURE: 167 MMHG | BODY MASS INDEX: 25.57 KG/M2 | WEIGHT: 135.4 LBS

## 2018-08-31 DIAGNOSIS — Z09 POSTOPERATIVE EXAMINATION: Primary | ICD-10-CM

## 2018-08-31 NOTE — PROGRESS NOTES
Patient is here for follow-up. Patient underwent Davinci paraesophageal hernia repair with Bio-A on 8/9/18. Doing well. No complaints. No dysphagia. No GERD symptoms. Currently on Nexium daily. PE:  Visit Vitals    BP (!) 167/97 (BP 1 Location: Left arm, BP Patient Position: Sitting)    Pulse 78    Temp 97.6 °F (36.4 °C)    Resp 16    Ht 5' 1\" (1.549 m)    Wt 61.4 kg (135 lb 6.4 oz)    SpO2 98%    BMI 25.58 kg/m2     Abdomen:  Soft, [employer] C/D/I.     Assessment:  S/p Davinci paraesophageal hernia repair with Bio-A     Plan:  -RTC in 1 month  -Will plan to wean PPI if doing well

## 2018-10-09 ENCOUNTER — OFFICE VISIT (OUTPATIENT)
Dept: SURGERY | Age: 78
End: 2018-10-09

## 2018-10-09 VITALS
HEIGHT: 61 IN | HEART RATE: 69 BPM | BODY MASS INDEX: 25.94 KG/M2 | TEMPERATURE: 97.7 F | DIASTOLIC BLOOD PRESSURE: 92 MMHG | SYSTOLIC BLOOD PRESSURE: 191 MMHG | OXYGEN SATURATION: 99 % | WEIGHT: 137.4 LBS

## 2018-10-09 DIAGNOSIS — Z09 POSTOPERATIVE EXAMINATION: Primary | ICD-10-CM

## 2018-10-09 NOTE — MR AVS SNAPSHOT
[address], [address] [facility], [address] 
[phone] Patient: Rita Do MRN: [id number] :1940 Visit Information Date & Time Provider Department Dept. Phone Encounter #  
 10/9/2018 11:00 AM Ruben Mendoza MD [facility] [place] [id number] Upcoming Health Maintenance Date Due DTaP/Tdap/Td series (1 - Tdap) 1960 Shingrix Vaccine Age 50> (1 of 2) 1989 GLAUCOMA SCREENING Q2Y 2004 Bone Densitometry (Dexa) Screening 2004 Pneumococcal 65+ Low/Medium Risk (1 of 2 - PCV13) 2004 MEDICARE YEARLY EXAM 3/14/2018 Influenza Age 5 to Adult 2018 Allergies as of 10/9/2018  Review Complete On: 10/9/2018 By: Ruben Mendoza MD  
  
 Severity Noted Reaction Type Reactions Penicillins Medium 2018    Rash Unsure of reaction, has been 20yrs since given and cannot remember extent of allergy, but has been avoiding this drug class Current Immunizations  Never Reviewed No immunizations on file. Not reviewed this visit You Were Diagnosed With   
  
 Codes Comments Postoperative examination    -  Primary ICD-10-CM: E80 ICD-9-CM: V67.00 Vitals BP Pulse Temp Height(growth percentile) Weight(growth percentile) SpO2  
 (!) 191/92 (BP 1 Location: Right arm, BP Patient Position: Sitting) 69 97.7 °F (36.5 °C) 5' 1\" (1.549 m) 137 lb 6.4 oz (62.3 kg) 99% BMI OB Status Smoking Status 25.96 kg/m2 Postmenopausal Never Smoker BMI and BSA Data Body Mass Index Body Surface Area  
 25.96 kg/m 2 1.64 m 2 Preferred Pharmacy Pharmacy Name Phone [address], [address] [phone] Your Updated Medication List  
  
   
This list is accurate as of 10/9/18  1:41 PM.  Always use your most recent med list.  
  
  
  
  
 aspirin 81 mg tablet Take  by mouth daily. atenolol 50 mg tablet Commonly known as:  TENORMIN Take 50 mg by mouth every morning. atorvastatin 10 mg tablet Commonly known as:  LIPITOR Take 10 mg by mouth nightly. CITRACAL PO Take  by mouth two (2) times a day. doxepin 25 mg capsule Commonly known as:  SINEquan Take 25 mg by mouth nightly. fexofenadine 180 mg tablet Commonly known as:  Aundra Done Take 180 mg by mouth nightly. FIBERCON PO Take  by mouth two (2) times a day. HYDROcodone-acetaminophen 5-325 mg per tablet Commonly known as:  Sharlotte Eis Take 1 Tab by mouth every four (4) hours as needed for Pain. Max Daily Amount: 6 Tabs. KLOR-CON M20 20 mEq tablet Generic drug:  potassium chloride Take 20 mEq by mouth two (2) times a day. metFORMIN 500 mg tablet Commonly known as:  GLUCOPHAGE Take 500 mg by mouth daily (with breakfast). NexIUM 40 mg capsule Generic drug:  esomeprazole Take 40 mg by mouth every morning. olmesartan 20 mg tablet Commonly known as:  [employer] Take 20 mg by mouth nightly. risedronate 35 mg tablet Commonly known as:  ACTONEL Take 35 mg by mouth every Tuesday. VITAMIN B-12 500 mcg tablet Generic drug:  cyanocobalamin Take 500 mcg by mouth every morning. VITAMIN D2 PO Take 1,000 Units by mouth nightly. Introducing [facility]! [employer] introduces [employer] patient portal. Now you can access parts of your medical record, email your doctor's office, and request medication refills online. 1. In your internet browser, go to [email or web address] 2. Click on the First Time User? Click Here link in the Sign In box. You will see the New Member Sign Up page. 3. Enter your [employer] Access Code exactly as it appears below. You will not need to use this code after youve completed the sign-up process.  If you do not sign up before the expiration date, you must request a new code. · [employer] Access Code: [email or web address] Expires: 10/19/2018 10:10 AM 
 
4. Enter the last four digits of your Social Security Number (xxxx) and Date of Birth (mm/dd/yyyy) as indicated and click Submit. You will be taken to the next sign-up page. 5. Create a [employer] ID. This will be your [employer] login ID and cannot be changed, so think of one that is secure and easy to remember. 6. Create a [employer] password. You can change your password at any time. 7. Enter your Password Reset Question and Answer. This can be used at a later time if you forget your password. 8. Enter your e-mail address. You will receive e-mail notification when new information is available in [address] 9. Click Sign Up. You can now view and download portions of your medical record. 10. Click the Download Summary menu link to download a portable copy of your medical information. If you have questions, please visit the Frequently Asked Questions section of the [employer] website. Remember, [employer] is NOT to be used for urgent needs. For medical emergencies, dial 911. Now available from your iPhone and Android! Please provide this summary of care documentation to your next provider. Your primary care clinician is listed as Clayton Reese. If you have any questions after today's visit, please call [phone].

## 2018-10-09 NOTE — PROGRESS NOTES
Patient is here for follow-up. Patient underwent Davinci paraesophageal hernia repair with Bio-A on 8/9/18. Doing well. No complaints. No dysphagia. No GERD symptoms. Currently on Nexium daily. PE: 
Visit Vitals  BP (!) 191/92 (BP 1 Location: Right arm, BP Patient Position: Sitting)  Pulse 69  Temp 97.7 °F (36.5 °C)  Ht 5' 1\" (1.549 m)  Wt 62.3 kg (137 lb 6.4 oz)  SpO2 99%  BMI 25.96 kg/m2 Abdomen:  Soft[employer] C/D/I. Assessment:  S/p Davinci paraesophageal hernia repair with Bio-A Plan: 
-Wean off Nexium as tolerated -RTC in 2 months

## 2018-10-09 NOTE — PROGRESS NOTES
Chief Complaint Patient presents with  Follow-up S/P  Davinci paraesophageal hernia repair with Bio-A 8/9/18 1. Have you been to the ER, urgent care clinic since your last visit? Hospitalized since your last visit? No 
 
2. Have you seen or consulted any other health care providers outside of the [address] [place] [address] since your last visit? Include any pap smears or colon screening.  No

## 2021-05-07 ENCOUNTER — HOSPITAL ENCOUNTER (INPATIENT)
Age: 81
LOS: 3 days | Discharge: HOME | DRG: 392 | End: 2021-05-10
Attending: STUDENT IN AN ORGANIZED HEALTH CARE EDUCATION/TRAINING PROGRAM | Admitting: INTERNAL MEDICINE
Payer: MEDICARE

## 2021-05-07 ENCOUNTER — APPOINTMENT (OUTPATIENT)
Dept: CT IMAGING | Age: 81
DRG: 392 | End: 2021-05-07
Attending: STUDENT IN AN ORGANIZED HEALTH CARE EDUCATION/TRAINING PROGRAM
Payer: MEDICARE

## 2021-05-07 DIAGNOSIS — E87.1 HYPONATREMIA: ICD-10-CM

## 2021-05-07 DIAGNOSIS — N17.9 AKI (ACUTE KIDNEY INJURY) (HCC): ICD-10-CM

## 2021-05-07 DIAGNOSIS — K57.92 DIVERTICULITIS: Primary | ICD-10-CM

## 2021-05-07 LAB
ALBUMIN SERPL-MCNC: 4.1 G/DL (ref 3.5–5)
ALBUMIN/GLOB SERPL: 1.1 {RATIO} (ref 1.1–2.2)
ALP SERPL-CCNC: 70 U/L (ref 45–117)
ALT SERPL-CCNC: 23 U/L (ref 12–78)
ANION GAP SERPL CALC-SCNC: 6 MMOL/L (ref 5–15)
APPEARANCE UR: CLEAR
AST SERPL-CCNC: 17 U/L (ref 15–37)
BACTERIA URNS QL MICRO: NEGATIVE /HPF
BASOPHILS # BLD: 0.1 K/UL (ref 0–0.1)
BASOPHILS NFR BLD: 0 % (ref 0–1)
BILIRUB SERPL-MCNC: 0.7 MG/DL (ref 0.2–1)
BILIRUB UR QL: NEGATIVE
BUN SERPL-MCNC: 16 MG/DL (ref 6–20)
BUN/CREAT SERPL: 9 (ref 12–20)
CALCIUM SERPL-MCNC: 10.1 MG/DL (ref 8.5–10.1)
CHLORIDE SERPL-SCNC: 94 MMOL/L (ref 97–108)
CO2 SERPL-SCNC: 24 MMOL/L (ref 21–32)
COLOR UR: ABNORMAL
CREAT SERPL-MCNC: 1.75 MG/DL (ref 0.55–1.02)
DIFFERENTIAL METHOD BLD: ABNORMAL
EOSINOPHIL # BLD: 0.2 K/UL (ref 0–0.4)
EOSINOPHIL NFR BLD: 1 % (ref 0–7)
EPITH CASTS URNS QL MICRO: ABNORMAL /LPF
ERYTHROCYTE [DISTWIDTH] IN BLOOD BY AUTOMATED COUNT: 13.2 % (ref 11.5–14.5)
GLOBULIN SER CALC-MCNC: 3.8 G/DL (ref 2–4)
GLUCOSE SERPL-MCNC: 98 MG/DL (ref 65–100)
GLUCOSE UR STRIP.AUTO-MCNC: NEGATIVE MG/DL
HCT VFR BLD AUTO: 42.5 % (ref 35–47)
HGB BLD-MCNC: 14.8 G/DL (ref 11.5–16)
HGB UR QL STRIP: NEGATIVE
HYALINE CASTS URNS QL MICRO: ABNORMAL /LPF (ref 0–5)
IMM GRANULOCYTES # BLD AUTO: 0.1 K/UL (ref 0–0.04)
IMM GRANULOCYTES NFR BLD AUTO: 1 % (ref 0–0.5)
KETONES UR QL STRIP.AUTO: NEGATIVE MG/DL
LEUKOCYTE ESTERASE UR QL STRIP.AUTO: ABNORMAL
LIPASE SERPL-CCNC: 94 U/L (ref 73–393)
LYMPHOCYTES # BLD: 2.3 K/UL (ref 0.8–3.5)
LYMPHOCYTES NFR BLD: 16 % (ref 12–49)
MCH RBC QN AUTO: 31 PG (ref 26–34)
MCHC RBC AUTO-ENTMCNC: 34.8 G/DL (ref 30–36.5)
MCV RBC AUTO: 88.9 FL (ref 80–99)
MONOCYTES # BLD: 0.8 K/UL (ref 0–1)
MONOCYTES NFR BLD: 6 % (ref 5–13)
NEUTS SEG # BLD: 11.3 K/UL (ref 1.8–8)
NEUTS SEG NFR BLD: 76 % (ref 32–75)
NITRITE UR QL STRIP.AUTO: NEGATIVE
NRBC # BLD: 0 K/UL (ref 0–0.01)
NRBC BLD-RTO: 0 PER 100 WBC
PH UR STRIP: 6.5 [PH] (ref 5–8)
PLATELET # BLD AUTO: 181 K/UL (ref 150–400)
PMV BLD AUTO: 10 FL (ref 8.9–12.9)
POTASSIUM SERPL-SCNC: 4.9 MMOL/L (ref 3.5–5.1)
PROT SERPL-MCNC: 7.9 G/DL (ref 6.4–8.2)
PROT UR STRIP-MCNC: NEGATIVE MG/DL
RBC # BLD AUTO: 4.78 M/UL (ref 3.8–5.2)
RBC #/AREA URNS HPF: ABNORMAL /HPF (ref 0–5)
SODIUM SERPL-SCNC: 124 MMOL/L (ref 136–145)
SP GR UR REFRACTOMETRY: 1.01 (ref 1–1.03)
UA: UC IF INDICATED,UAUC: ABNORMAL
UROBILINOGEN UR QL STRIP.AUTO: 1 EU/DL (ref 0.2–1)
WBC # BLD AUTO: 14.8 K/UL (ref 3.6–11)
WBC URNS QL MICRO: ABNORMAL /HPF (ref 0–4)

## 2021-05-07 PROCEDURE — 74011250636 HC RX REV CODE- 250/636: Performed by: STUDENT IN AN ORGANIZED HEALTH CARE EDUCATION/TRAINING PROGRAM

## 2021-05-07 PROCEDURE — 74011250637 HC RX REV CODE- 250/637: Performed by: INTERNAL MEDICINE

## 2021-05-07 PROCEDURE — 85025 COMPLETE CBC W/AUTO DIFF WBC: CPT

## 2021-05-07 PROCEDURE — 74011250636 HC RX REV CODE- 250/636: Performed by: INTERNAL MEDICINE

## 2021-05-07 PROCEDURE — 87086 URINE CULTURE/COLONY COUNT: CPT

## 2021-05-07 PROCEDURE — 96374 THER/PROPH/DIAG INJ IV PUSH: CPT

## 2021-05-07 PROCEDURE — 96361 HYDRATE IV INFUSION ADD-ON: CPT

## 2021-05-07 PROCEDURE — 83690 ASSAY OF LIPASE: CPT

## 2021-05-07 PROCEDURE — 36415 COLL VENOUS BLD VENIPUNCTURE: CPT

## 2021-05-07 PROCEDURE — 99285 EMERGENCY DEPT VISIT HI MDM: CPT

## 2021-05-07 PROCEDURE — 81001 URINALYSIS AUTO W/SCOPE: CPT

## 2021-05-07 PROCEDURE — 80053 COMPREHEN METABOLIC PANEL: CPT

## 2021-05-07 PROCEDURE — 74176 CT ABD & PELVIS W/O CONTRAST: CPT

## 2021-05-07 PROCEDURE — 65660000000 HC RM CCU STEPDOWN

## 2021-05-07 RX ORDER — ONDANSETRON 2 MG/ML
4 INJECTION INTRAMUSCULAR; INTRAVENOUS
Status: DISCONTINUED | OUTPATIENT
Start: 2021-05-07 | End: 2021-05-10 | Stop reason: HOSPADM

## 2021-05-07 RX ORDER — MELATONIN
1000 DAILY
COMMUNITY
End: 2021-05-10

## 2021-05-07 RX ORDER — LOSARTAN POTASSIUM 100 MG/1
100 TABLET ORAL DAILY
COMMUNITY
End: 2021-05-10

## 2021-05-07 RX ORDER — METRONIDAZOLE 500 MG/100ML
500 INJECTION, SOLUTION INTRAVENOUS
Status: COMPLETED | OUTPATIENT
Start: 2021-05-07 | End: 2021-05-07

## 2021-05-07 RX ORDER — SODIUM CHLORIDE 0.9 % (FLUSH) 0.9 %
5-40 SYRINGE (ML) INJECTION EVERY 8 HOURS
Status: DISCONTINUED | OUTPATIENT
Start: 2021-05-07 | End: 2021-05-10 | Stop reason: HOSPADM

## 2021-05-07 RX ORDER — POLYETHYLENE GLYCOL 3350 17 G/17G
17 POWDER, FOR SOLUTION ORAL DAILY PRN
Status: DISCONTINUED | OUTPATIENT
Start: 2021-05-07 | End: 2021-05-10 | Stop reason: HOSPADM

## 2021-05-07 RX ORDER — ENOXAPARIN SODIUM 100 MG/ML
30 INJECTION SUBCUTANEOUS DAILY
Status: DISCONTINUED | OUTPATIENT
Start: 2021-05-08 | End: 2021-05-10 | Stop reason: HOSPADM

## 2021-05-07 RX ORDER — PANTOPRAZOLE SODIUM 40 MG/1
40 TABLET, DELAYED RELEASE ORAL
Status: DISCONTINUED | OUTPATIENT
Start: 2021-05-08 | End: 2021-05-10 | Stop reason: HOSPADM

## 2021-05-07 RX ORDER — SODIUM CHLORIDE 0.9 % (FLUSH) 0.9 %
5-40 SYRINGE (ML) INJECTION AS NEEDED
Status: DISCONTINUED | OUTPATIENT
Start: 2021-05-07 | End: 2021-05-10 | Stop reason: HOSPADM

## 2021-05-07 RX ORDER — METRONIDAZOLE 500 MG/100ML
500 INJECTION, SOLUTION INTRAVENOUS EVERY 8 HOURS
Status: DISCONTINUED | OUTPATIENT
Start: 2021-05-08 | End: 2021-05-08

## 2021-05-07 RX ORDER — SODIUM CHLORIDE 9 MG/ML
100 INJECTION, SOLUTION INTRAVENOUS CONTINUOUS
Status: DISCONTINUED | OUTPATIENT
Start: 2021-05-07 | End: 2021-05-10 | Stop reason: HOSPADM

## 2021-05-07 RX ORDER — ACETAMINOPHEN 325 MG/1
650 TABLET ORAL
Status: DISCONTINUED | OUTPATIENT
Start: 2021-05-07 | End: 2021-05-10 | Stop reason: HOSPADM

## 2021-05-07 RX ORDER — PROMETHAZINE HYDROCHLORIDE 25 MG/1
12.5 TABLET ORAL
Status: DISCONTINUED | OUTPATIENT
Start: 2021-05-07 | End: 2021-05-10 | Stop reason: HOSPADM

## 2021-05-07 RX ORDER — ATORVASTATIN CALCIUM 20 MG/1
20 TABLET, FILM COATED ORAL
Status: DISCONTINUED | OUTPATIENT
Start: 2021-05-07 | End: 2021-05-10 | Stop reason: HOSPADM

## 2021-05-07 RX ORDER — ACETAMINOPHEN 650 MG/1
650 SUPPOSITORY RECTAL
Status: DISCONTINUED | OUTPATIENT
Start: 2021-05-07 | End: 2021-05-10 | Stop reason: HOSPADM

## 2021-05-07 RX ORDER — LEVOFLOXACIN 5 MG/ML
750 INJECTION, SOLUTION INTRAVENOUS
Status: DISCONTINUED | OUTPATIENT
Start: 2021-05-09 | End: 2021-05-10 | Stop reason: HOSPADM

## 2021-05-07 RX ORDER — SPIRONOLACTONE 25 MG/1
25 TABLET ORAL DAILY
COMMUNITY
End: 2021-06-27

## 2021-05-07 RX ORDER — LEVOFLOXACIN 5 MG/ML
750 INJECTION, SOLUTION INTRAVENOUS
Status: COMPLETED | OUTPATIENT
Start: 2021-05-07 | End: 2021-05-08

## 2021-05-07 RX ADMIN — Medication 10 ML: at 22:54

## 2021-05-07 RX ADMIN — METRONIDAZOLE 500 MG: 500 INJECTION, SOLUTION INTRAVENOUS at 22:34

## 2021-05-07 RX ADMIN — SODIUM CHLORIDE 100 ML/HR: 9 INJECTION, SOLUTION INTRAVENOUS at 22:52

## 2021-05-07 RX ADMIN — ATORVASTATIN CALCIUM 20 MG: 20 TABLET, FILM COATED ORAL at 23:15

## 2021-05-07 RX ADMIN — LEVOFLOXACIN 750 MG: 5 INJECTION, SOLUTION INTRAVENOUS at 23:50

## 2021-05-07 RX ADMIN — SODIUM CHLORIDE 1000 ML: 9 INJECTION, SOLUTION INTRAVENOUS at 17:36

## 2021-05-07 RX ADMIN — SODIUM CHLORIDE 500 ML: 9 INJECTION, SOLUTION INTRAVENOUS at 22:10

## 2021-05-07 NOTE — ED PROVIDER NOTES
EMERGENCY DEPARTMENT HISTORY AND PHYSICAL EXAM      Date: 5/7/2021  Patient Name: Sarah Mitchell    History of Presenting Illness     Chief Complaint   Patient presents with    Abdominal Pain     Pt having diarrhea, chills and nausea for the past couple days with decreased appetite with abdominal pain.  Diarrhea    Abnormal Lab Results     [facility] and was told that her kidney function tests were elevated and was told to come to ED for further work up. History Provided By: Patient    HPI: Sarah Mitchell, 79 y.o. female with past medical history of hiatal hernia status post repair, presents to the ED with cc of abdominal pain, nausea, vomiting, diarrhea, decreased appetite x 4 days. Was seen earlier today at urgent care, found to have worsening renal function, elevated WBC count, and was subsequently referred to the ED for further management. She was given antiemetic prior to discharge from the urgent care, which she states has helped her symptoms significantly. States that her abdominal pain is diffuse, but when the physician at urgent care pushed on her, it was mostly localized over the left side of her abdomen. Patient denies any fevers, but states that she has been experiencing chills over the past several days. Denies any urinary symptoms. PCP: Janneth Means MD    No current facility-administered medications on file prior to encounter. Current Outpatient Medications on File Prior to Encounter   Medication Sig Dispense Refill    HYDROcodone-acetaminophen (NORCO) 5-325 mg per tablet Take 1 Tab by mouth every four (4) hours as needed for Pain. Max Daily Amount: 6 Tabs. 40 Tab 0    risedronate (ACTONEL) 35 mg tablet Take 35 mg by mouth every Tuesday.  atenolol (TENORMIN) 50 mg tablet Take 50 mg by mouth every morning.  cyanocobalamin (VITAMIN B-12) 500 mcg tablet Take 500 mcg by mouth every morning.       atorvastatin (LIPITOR) 10 mg tablet Take 10 mg by mouth nightly.  olmesartan (BENICAR) 20 mg tablet Take 20 mg by mouth nightly.  fexofenadine (ALLEGRA) 180 mg tablet Take 180 mg by mouth nightly.  doxepin (SINEQUAN) 25 mg capsule Take 25 mg by mouth nightly.  metFORMIN (GLUCOPHAGE) 500 mg tablet Take 500 mg by mouth daily (with breakfast).  potassium chloride (KLOR-CON M20) 20 mEq tablet Take 20 mEq by mouth two (2) times a day.  esomeprazole (NEXIUM) 40 mg capsule Take 40 mg by mouth every morning.  CALCIUM POLYCARBOPHIL (FIBERCON PO) Take  by mouth two (2) times a day.  CALCIUM CITRATE (CITRACAL PO) Take  by mouth two (2) times a day.  ERGOCALCIFEROL (VITAMIN D PO) Take 1,000 Units by mouth nightly.  aspirin 81 mg tablet Take  by mouth daily. Past History     Past Medical History:  Past Medical History:   Diagnosis Date    Anemia 6/18/2013    Cancer ([facility] [address]     CLL    Diabetes ([facility] [address]     Epigastric pain 6/18/2013    Family history of colon cancer 6/18/2013    sister    GERD (gastroesophageal reflux disease)     Hypertension     Ill-defined condition 2009    blood tranfusion hx       Past Surgical History:  Past Surgical History:   Procedure Laterality Date    HX CATARACT REMOVAL      bilateral    HX CHOLECYSTECTOMY      HX HERNIA REPAIR  08/09/2018    Emilyi hiatal hernia- Sharif Barton MD    HX KNEE ARTHROSCOPY      LEFT    HX OTHER SURGICAL      portacath/removed    HX TUBAL LIGATION      HX VASCULAR ACCESS      SERGE CATH LL CHEST       Family History:  Family History   Problem Relation Age of Onset    Heart Disease Mother    [place] Arthritis-rheumatoid Mother        Social History:  Social History     Tobacco Use    Smoking status: Never Smoker    Smokeless tobacco: Never Used   Substance Use Topics    Alcohol use: No    Drug use: Yes     Types: Prescription, OTC       Allergies:   Allergies   Allergen Reactions    Penicillins Rash     Unsure of reaction, has been 20yrs since given and cannot remember extent of allergy, but has been avoiding this drug class         Review of Systems   Review of Systems   Constitutional: Positive for appetite change and chills. Negative for fever. HENT: Negative for congestion and rhinorrhea. Eyes: Negative for visual disturbance. Respiratory: Negative for chest tightness and shortness of breath. Cardiovascular: Negative for chest pain, palpitations and leg swelling. Gastrointestinal: Positive for abdominal pain, diarrhea, nausea and vomiting. Genitourinary: Negative for dysuria, flank pain and hematuria. Musculoskeletal: Negative for back pain and neck pain. Skin: Negative for rash. Allergic/Immunologic: Negative for immunocompromised state. Neurological: Negative for dizziness, speech difficulty, weakness and headaches. Hematological: Negative for adenopathy. Psychiatric/Behavioral: Negative for dysphoric mood and suicidal ideas. Physical Exam   Physical Exam  Vitals signs and nursing note reviewed. Constitutional:       General: She is not in acute distress. Appearance: She is not ill-appearing or toxic-appearing. HENT:      Head: Normocephalic and atraumatic. Nose: Nose normal.      Mouth/Throat:      Mouth: Mucous membranes are dry. Eyes:      Extraocular Movements: Extraocular movements intact. Pupils: Pupils are equal, round, and reactive to light. Neck:      Musculoskeletal: Normal range of motion and neck supple. Cardiovascular:      Rate and Rhythm: Normal rate and regular rhythm. Pulses: Normal pulses. Pulmonary:      Effort: Pulmonary effort is normal.      Breath sounds: No stridor. No wheezing or rhonchi. Abdominal:      General: Abdomen is flat. There is no distension. Tenderness: There is abdominal tenderness in the left upper quadrant and left lower quadrant. Musculoskeletal: Normal range of motion. Skin:     General: Skin is warm and dry.    Neurological:      General: No focal deficit present. Mental Status: She is alert and oriented to person, place, and time. Psychiatric:         Judgment: Judgment normal.         Diagnostic Study Results     Labs -     Recent Results (from the past 24 hour(s))   CBC WITH AUTOMATED DIFF    Collection Time: 05/07/21  5:08 PM   Result Value Ref Range    WBC 14.8 (H) 3.6 - 11.0 K/uL    RBC 4.78 3.80 - 5.20 M/uL    HGB 14.8 11.5 - 16.0 g/dL    HCT 42.5 35.0 - 47.0 %    MCV 88.9 80.0 - 99.0 FL    MCH 31.0 26.0 - 34.0 PG    MCHC 34.8 30.0 - 36.5 g/dL    RDW 13.2 11.5 - 14.5 %    PLATELET 682 401 - 110 K/uL    MPV 10.0 8.9 - 12.9 FL    NRBC 0.0 0  WBC    ABSOLUTE NRBC 0.00 0.00 - 0.01 K/uL    NEUTROPHILS 76 (H) 32 - 75 %    LYMPHOCYTES 16 12 - 49 %    MONOCYTES 6 5 - 13 %    EOSINOPHILS 1 0 - 7 %    BASOPHILS 0 0 - 1 %    IMMATURE GRANULOCYTES 1 (H) 0.0 - 0.5 %    ABS. NEUTROPHILS 11.3 (H) 1.8 - 8.0 K/UL    ABS. LYMPHOCYTES 2.3 0.8 - 3.5 K/UL    ABS. MONOCYTES 0.8 0.0 - 1.0 K/UL    ABS. EOSINOPHILS 0.2 0.0 - 0.4 K/UL    ABS. BASOPHILS 0.1 0.0 - 0.1 K/UL    ABS. IMM. GRANS. 0.1 (H) 0.00 - 0.04 K/UL    DF AUTOMATED     METABOLIC PANEL, COMPREHENSIVE    Collection Time: 05/07/21  5:08 PM   Result Value Ref Range    Sodium 124 (L) 136 - 145 mmol/L    Potassium 4.9 3.5 - 5.1 mmol/L    Chloride 94 (L) 97 - 108 mmol/L    CO2 24 21 - 32 mmol/L    Anion gap 6 5 - 15 mmol/L    Glucose 98 65 - 100 mg/dL    BUN 16 6 - 20 MG/DL    Creatinine 1.75 (H) 0.55 - 1.02 MG/DL    BUN/Creatinine ratio 9 (L) 12 - 20      GFR est AA 34 (L) >60 ml/min/1.73m2    GFR est non-AA 28 (L) >60 ml/min/1.73m2    Calcium 10.1 8.5 - 10.1 MG/DL    Bilirubin, total 0.7 0.2 - 1.0 MG/DL    ALT (SGPT) 23 12 - 78 U/L    AST (SGOT) 17 15 - 37 U/L    Alk.  phosphatase 70 45 - 117 U/L    Protein, total 7.9 6.4 - 8.2 g/dL    Albumin 4.1 3.5 - 5.0 g/dL    Globulin 3.8 2.0 - 4.0 g/dL    A-G Ratio 1.1 1.1 - 2.2     LIPASE    Collection Time: 05/07/21  5:08 PM   Result Value Ref Range Lipase 94 73 - 393 U/L   URINALYSIS W/ REFLEX CULTURE    Collection Time: 05/07/21  7:42 PM    Specimen: Urine   Result Value Ref Range    Color YELLOW/STRAW      Appearance CLEAR CLEAR      Specific gravity 1.011 1.003 - 1.030      pH (UA) 6.5 5.0 - 8.0      Protein Negative NEG mg/dL    Glucose Negative NEG mg/dL    Ketone Negative NEG mg/dL    Bilirubin Negative NEG      Blood Negative NEG      Urobilinogen 1.0 0.2 - 1.0 EU/dL    Nitrites Negative NEG      Leukocyte Esterase SMALL (A) NEG      WBC 10-20 0 - 4 /hpf    RBC 0-5 0 - 5 /hpf    Epithelial cells FEW FEW /lpf    Bacteria Negative NEG /hpf    UA:UC IF INDICATED URINE CULTURE ORDERED (A) CNI      Hyaline cast 0-2 0 - 5 /lpf       Radiologic Studies -   CT ABD PELV WO CONT   Final Result      1. Colonic diverticulosis. Sigmoid diverticulitis. No pericolonic abscess   demonstrated. 2. Peripheral subpleural reticular densities in right middle lobe and inferior   lingula consistent with interstitial process which should be correlated   clinically. 3. Status post cholecystectomy. 4. Small bilateral inguinal hernias containing fat. CT Results  (Last 48 hours)               05/07/21 2010  CT ABD PELV WO CONT Final result    Impression:      1. Colonic diverticulosis. Sigmoid diverticulitis. No pericolonic abscess   demonstrated. 2. Peripheral subpleural reticular densities in right middle lobe and inferior   lingula consistent with interstitial process which should be correlated   clinically. 3. Status post cholecystectomy. 4. Small bilateral inguinal hernias containing fat. Narrative:  EXAM: CT ABD PELV WO CONT       INDICATION: abd pain, N/V/D       COMPARISON: None       CONTRAST:  None. TECHNIQUE:    Thin axial images were obtained through the abdomen and pelvis. Coronal and   sagittal reformats were generated. Oral contrast was not administered.  CT dose   reduction was achieved through use of a standardized protocol tailored for this   examination and automatic exposure control for dose modulation. The absence of intravenous and oral contrast material reduces the capacity of CT   to evaluate the bowel, vasculature and solid organs. FINDINGS:    LOWER THORAX: Reticular densities shown peripherally in the right middle lobe   and inferior lingula which are nonspecific though consistent with an   interstitial process which should be correlated clinically. LIVER: No mass. BILIARY TREE: Status post cholecystectomy. CBD is not dilated. SPLEEN: within normal limits. PANCREAS: No focal abnormality. ADRENALS: Unremarkable. KIDNEYS/URETERS: No calculus or hydronephrosis. STOMACH: Unremarkable. SMALL BOWEL: No dilatation or wall thickening. COLON: Diffuse diverticulosis. There is a thickened diverticulum in the proximal   sigmoid colon with moderately abundant adjacent fat stranding consistent with   diverticulitis. A pericolonic collection is not shown. APPENDIX: Not demonstrated. PERITONEUM: No ascites or pneumoperitoneum. RETROPERITONEUM: Abundant atherosclerotic calcifications. No aneurysm or   retroperitoneal mass-adenopathy demonstrated. REPRODUCTIVE ORGANS: 12 mm calcified leiomyoma in uterine fundus. URINARY BLADDER: No mass or calculus. BONES: Moderate diffuse osteopenia. Lumbar spine and lower thoracic spine   degenerative changes which is severe at L4-5 and L5-S1. Grade 1 anterolisthesis   at L4-5. ABDOMINAL WALL: Small bilateral inguinal hernias containing fat. ADDITIONAL COMMENTS: N/A               CXR Results  (Last 48 hours)    None          Medical Decision Making   I am the first provider for this patient. I reviewed the vital signs, available nursing notes, past medical history, past surgical history, family history and social history. Vital Signs-Reviewed the patient's vital signs.   Patient Vitals for the past 24 hrs:   Temp Pulse Resp BP SpO2   05/07/21 2030  (!) 56 10 (!) 107/59 100 %   05/07/21 2026    94/63    05/07/21 1930  (!) 53 13 (!) 99/56 97 %   05/07/21 1900  (!) 54 14 (!) 100/54 98 %   05/07/21 1830  (!) 53 12 (!) 106/55 100 %   05/07/21 1800  (!) 55 10 (!) 105/54    05/07/21 1730  (!) 56 11 (!) 96/58 100 %   05/07/21 1714    (!) 97/59 98 %   05/07/21 1649 97.5 °F (36.4 °C) (!) 55 15 125/64 100 %       Records Reviewed: Nursing Notes and Old Medical Records    Provider Notes (Medical Decision Making):   60-year-old female presenting for evaluation of abdominal pain associated with decreased p.o. intake, nausea, diarrhea, chills. Patient has soft normal blood pressures on arrival to the ED, not tachycardic, afebrile. On exam, she is nontoxic in appearance. Mentating at her baseline. Her abdomen is soft, nondistended, tender to palpation throughout the left quadrants. Patient is given IV fluids, antiemetics. Cup is remarkable for leukocytosis of 14.8. She has hyponatremia of 124, likely secondary to dehydration. KATI with creatinine of 1.75, BUN of 16. Baseline creatinine is less than 1.  CT abdomen pelvis reveal colonic diverticulosis with sigmoid diverticulitis. Peripheral subpleural reticular densities in right middle lobe inferior lingular consistent with interstitial process which should be correlated clinically. Patient has no cough, no complaint of chest pain, satting well on room air, unlikely to be acute pneumonia at this time. I did discuss these findings with the patient as well as her daughter at bedside. UA reveals small leukocyte esterase, 10-20 WBCs, no bacteria. Urine culture is automatically ordered. Per patient and daughtershe is prone to UTIs, and has 1 every 2 months or so. Patient has been having some urinary frequency. Possible early or mild UTI? Results of today's work-up discussed with the patient as well as daughter. Initially, patient was very adamant about being discharged to go home.   However, considering she is still with low normal blood pressures with systolic in the low 148Z/HAQL 90s despite IV hydration in the ED, as well as ongoing inability to p.o. well in the ED, I feel that she would benefit from admission. Strongly recommended admission to patient as well as daughter. After some deliberation, patient was agreeable to admission. IV Flagyl and Levaquin ordered, which should cover for diverticulitis as well as for possible early UTI. Patient was discussed with hospitalist, who has accepted her for admission in stable condition at this time. Amarilys Brady MD      Disposition:  Admit      Diagnosis     Clinical Impression:   1. Diverticulitis    2. KATI (acute kidney injury) ([facility] [address]    3. Hyponatremia        Attestations:    Amarilys Brady MD    Please note that this dictation was completed with [employer], the computer voice recognition software. Quite often unanticipated grammatical, syntax, homophones, and other interpretive errors are inadvertently transcribed by the computer software. Please disregard these errors. Please excuse any errors that have escaped final proofreading. Thank you.

## 2021-05-08 ENCOUNTER — APPOINTMENT (OUTPATIENT)
Dept: GENERAL RADIOLOGY | Age: 81
DRG: 392 | End: 2021-05-08
Attending: NURSE PRACTITIONER
Payer: MEDICARE

## 2021-05-08 LAB
ANION GAP SERPL CALC-SCNC: 7 MMOL/L (ref 5–15)
BUN SERPL-MCNC: 14 MG/DL (ref 6–20)
BUN/CREAT SERPL: 9 (ref 12–20)
CALCIUM SERPL-MCNC: 9.1 MG/DL (ref 8.5–10.1)
CHLORIDE SERPL-SCNC: 103 MMOL/L (ref 97–108)
CO2 SERPL-SCNC: 23 MMOL/L (ref 21–32)
CREAT SERPL-MCNC: 1.53 MG/DL (ref 0.55–1.02)
ERYTHROCYTE [DISTWIDTH] IN BLOOD BY AUTOMATED COUNT: 13.4 % (ref 11.5–14.5)
GLUCOSE BLD STRIP.AUTO-MCNC: 77 MG/DL (ref 65–100)
GLUCOSE BLD STRIP.AUTO-MCNC: 80 MG/DL (ref 65–100)
GLUCOSE SERPL-MCNC: 86 MG/DL (ref 65–100)
HCT VFR BLD AUTO: 36.9 % (ref 35–47)
HGB BLD-MCNC: 12.7 G/DL (ref 11.5–16)
MCH RBC QN AUTO: 30.7 PG (ref 26–34)
MCHC RBC AUTO-ENTMCNC: 34.4 G/DL (ref 30–36.5)
MCV RBC AUTO: 89.1 FL (ref 80–99)
NRBC # BLD: 0 K/UL (ref 0–0.01)
NRBC BLD-RTO: 0 PER 100 WBC
PLATELET # BLD AUTO: 154 K/UL (ref 150–400)
PMV BLD AUTO: 11.3 FL (ref 8.9–12.9)
POTASSIUM SERPL-SCNC: 4.4 MMOL/L (ref 3.5–5.1)
RBC # BLD AUTO: 4.14 M/UL (ref 3.8–5.2)
SERVICE CMNT-IMP: NORMAL
SERVICE CMNT-IMP: NORMAL
SODIUM SERPL-SCNC: 133 MMOL/L (ref 136–145)
WBC # BLD AUTO: 8.3 K/UL (ref 3.6–11)

## 2021-05-08 PROCEDURE — 36415 COLL VENOUS BLD VENIPUNCTURE: CPT

## 2021-05-08 PROCEDURE — 82962 GLUCOSE BLOOD TEST: CPT

## 2021-05-08 PROCEDURE — 74011250636 HC RX REV CODE- 250/636: Performed by: GENERAL ACUTE CARE HOSPITAL

## 2021-05-08 PROCEDURE — 74011250637 HC RX REV CODE- 250/637: Performed by: INTERNAL MEDICINE

## 2021-05-08 PROCEDURE — 74011250636 HC RX REV CODE- 250/636: Performed by: INTERNAL MEDICINE

## 2021-05-08 PROCEDURE — 80048 BASIC METABOLIC PNL TOTAL CA: CPT

## 2021-05-08 PROCEDURE — 85027 COMPLETE CBC AUTOMATED: CPT

## 2021-05-08 PROCEDURE — 65660000000 HC RM CCU STEPDOWN

## 2021-05-08 RX ORDER — INSULIN LISPRO 100 [IU]/ML
INJECTION, SOLUTION INTRAVENOUS; SUBCUTANEOUS EVERY 6 HOURS
Status: DISCONTINUED | OUTPATIENT
Start: 2021-05-08 | End: 2021-05-10 | Stop reason: HOSPADM

## 2021-05-08 RX ORDER — DEXTROSE 50 % IN WATER (D50W) INTRAVENOUS SYRINGE
12.5-25 AS NEEDED
Status: DISCONTINUED | OUTPATIENT
Start: 2021-05-08 | End: 2021-05-10 | Stop reason: HOSPADM

## 2021-05-08 RX ORDER — MAGNESIUM SULFATE 100 %
4 CRYSTALS MISCELLANEOUS AS NEEDED
Status: DISCONTINUED | OUTPATIENT
Start: 2021-05-08 | End: 2021-05-10 | Stop reason: HOSPADM

## 2021-05-08 RX ORDER — METRONIDAZOLE 500 MG/100ML
500 INJECTION, SOLUTION INTRAVENOUS EVERY 12 HOURS
Status: DISCONTINUED | OUTPATIENT
Start: 2021-05-08 | End: 2021-05-10 | Stop reason: HOSPADM

## 2021-05-08 RX ADMIN — ENOXAPARIN SODIUM 30 MG: 100 INJECTION SUBCUTANEOUS at 10:13

## 2021-05-08 RX ADMIN — PANTOPRAZOLE SODIUM 40 MG: 40 TABLET, DELAYED RELEASE ORAL at 06:38

## 2021-05-08 RX ADMIN — METRONIDAZOLE 500 MG: 500 INJECTION, SOLUTION INTRAVENOUS at 10:13

## 2021-05-08 RX ADMIN — METRONIDAZOLE 500 MG: 500 INJECTION, SOLUTION INTRAVENOUS at 23:39

## 2021-05-08 RX ADMIN — ATORVASTATIN CALCIUM 20 MG: 20 TABLET, FILM COATED ORAL at 22:07

## 2021-05-08 RX ADMIN — Medication 10 ML: at 22:10

## 2021-05-08 RX ADMIN — Medication 10 ML: at 15:19

## 2021-05-08 RX ADMIN — Medication 10 ML: at 06:08

## 2021-05-08 NOTE — H&P
Hospitalist Admission Note    NAME: Ricky Mayes   :  1940   MRN:  [id number]     Date/Time:  2021 10:43 PM    Patient PCP: Anthony Kiser MD  ________________________________________________________________________    My assessment of this patient's clinical condition and my plan of care is as follows. Assessment / Plan:  Acute sigmoid diverticulitis  -CT confirms acute sigmoid diverticulitis  -Keep n.p.o.  Start IV fluids. Start empiric levofloxacin and Flagyl.  -Consider GI consultation  -Start diet once symptoms are improved      Acute kidney injury likely prerenal due to dehydration  Hyponatremia likely related to dehydration  -Patient reports poor intake due to abdominal pain  -Baseline creatinine is normal and currently creatinine is 1.75  -Baseline sodium is normal and currently it is 124  -Start normal saline. Repeat BMP in a.m.  -Hold all home nephrotoxic medications including Metformin, Aldactone, olmesartan    Diabetes mellitus type 2  -Hold home metformin. Start insulin sliding scale with blood sugar checks    Hypertension  -Currently blood pressure is borderline with systolic around 98  -Hold home atenolol, olmesartan, Metformin    History of GERD  Dyslipidemia  -Resume home PPI and statin    Incidental interstitial changes of lungs on CT abd  -Get 2 view CXR in am         Code Status: Full code  Surrogate Decision Maker: Daughter    DVT Prophylaxis: Lovenox      Baseline: From home independent of ADLs        Subjective:   CHIEF COMPLAINT: Abdominal pain    HISTORY OF PRESENT ILLNESS:     Natasha Lion is a 79 y.o.   female who presents with past medical history of hypertension, dyslipidemia, diabetes mellitus is coming the hospital chief complaints of lower abdominal pain. Patient reports pain is mostly in the lower abdomen about 3 x 10, nonradiating, without any aggravating or relieving factors. Reports mild nausea.   Does not report any chest pain or shortness of breath. On arrival to ED, she was borderline bradycardic, blood pressure was within normal limits. On labs she was noted to have a leukocytosis of 14.8. BMP showed a sodium of 124, creatinine 1.75. Calcium of 10.1. LFTs were within normal limits. CT shows evidence of sigmoid diverticulitis. We were asked to admit for work up and evaluation of the above problems. Past Medical History:   Diagnosis Date    Anemia 6/18/2013    [facility] ([facility] [address]     CLL    Diabetes ([facility] [address]     Epigastric pain 6/18/2013    Family history of colon cancer 6/18/2013    sister    GERD (gastroesophageal reflux disease)     Hypertension     Ill-defined condition 2009    blood tranfusion hx        Past Surgical History:   Procedure Laterality Date    HX CATARACT REMOVAL      bilateral    HX CHOLECYSTECTOMY      HX HERNIA REPAIR  08/09/2018    Davinci hiatal hernia- Theresa Bower MD    HX KNEE ARTHROSCOPY      LEFT    HX OTHER SURGICAL      portacath/removed    HX TUBAL LIGATION      HX VASCULAR ACCESS      SERGE CATH LL CHEST       Social History     Tobacco Use    Smoking status: Never Smoker    Smokeless tobacco: Never Used   Substance Use Topics    Alcohol use: No        Family History   Problem Relation Age of Onset    Heart Disease Mother     Arthritis-rheumatoid Mother      Allergies   Allergen Reactions    Penicillins Rash     Unsure of reaction, has been 20yrs since given and cannot remember extent of allergy, but has been avoiding this drug class        Prior to Admission medications    Medication Sig Start Date End Date Taking? Authorizing Provider   losartan (COZAAR) 100 mg tablet Take 100 mg by mouth daily. Yes Other, MD Navid   cholecalciferol (VITAMIN D3) (1000 Units /25 mcg) tablet Take 1,000 Units by mouth daily. Yes Other, MD Navid   spironolactone (ALDACTONE) 25 mg tablet Take 25 mg by mouth daily.    Yes Other, MD Navid   HYDROcodone-acetaminophen (NORCO) 5-325 mg per tablet Take 1 Tab by mouth every four (4) hours as needed for Pain. Max Daily Amount: 6 Tabs. 8/9/18   Elida LONDON MD   risedronate (ACTONEL) 35 mg tablet Take 35 mg by mouth every Tuesday. Provider, Historical   atenolol (TENORMIN) 50 mg tablet Take 50 mg by mouth every morning. Provider, Historical   cyanocobalamin (VITAMIN B-12) 500 mcg tablet Take 500 mcg by mouth every morning. Provider, Historical   atorvastatin (LIPITOR) 10 mg tablet Take 10 mg by mouth nightly. Provider, Historical   olmesartan (BENICAR) 20 mg tablet Take 20 mg by mouth nightly. Provider, Historical   fexofenadine (ALLEGRA) 180 mg tablet Take 180 mg by mouth nightly. Provider, Historical   doxepin (SINEQUAN) 25 mg capsule Take 25 mg by mouth nightly. Provider, Historical   metFORMIN (GLUCOPHAGE) 500 mg tablet Take 500 mg by mouth daily (with breakfast). Provider, Historical   potassium chloride (KLOR-CON M20) 20 mEq tablet Take 20 mEq by mouth two (2) times a day. Provider, Historical   esomeprazole (NEXIUM) 40 mg capsule Take 40 mg by mouth every morning. 6/16/10   Provider, Historical   CALCIUM POLYCARBOPHIL (FIBERCON PO) Take  by mouth two (2) times a day. 6/16/10   Provider, Historical   CALCIUM CITRATE (CITRACAL PO) Take  by mouth two (2) times a day. 6/16/10   Provider, Historical   ERGOCALCIFEROL (VITAMIN D PO) Take 1,000 Units by mouth nightly. 6/16/10   Provider, Historical   aspirin 81 mg tablet Take  by mouth daily. 6/16/10   Provider, Historical       REVIEW OF SYSTEMS:     I am not able to complete the review of systems because:    The patient is intubated and sedated    The patient has altered mental status due to his acute medical problems    The patient has baseline aphasia from prior stroke(s)    The patient has baseline dementia and is not reliable historian    The patient is in acute medical distress and unable to provide information           Total of 12 systems reviewed as follows:       POSITIVE= underlined text  Negative = text not underlined  General:  fever, chills, sweats, generalized weakness, weight loss/gain,      loss of appetite   Eyes:    blurred vision, eye pain, loss of vision, double vision  ENT:    rhinorrhea, pharyngitis   Respiratory:   cough, sputum production, SOB, AMADO, wheezing, pleuritic pain   Cardiology:   chest pain, palpitations, orthopnea, PND, edema, syncope   Gastrointestinal:  abdominal pain , N/V, diarrhea, dysphagia, constipation, bleeding   Genitourinary:  frequency, urgency, dysuria, hematuria, incontinence   Muskuloskeletal :  arthralgia, myalgia, back pain  Hematology:  easy bruising, nose or gum bleeding, lymphadenopathy   Dermatological: rash, ulceration, pruritis, color change / jaundice  Endocrine:   hot flashes or polydipsia   Neurological:  headache, dizziness, confusion, focal weakness, paresthesia,     Speech difficulties, memory loss, gait difficulty  Psychological: Feelings of anxiety, depression, agitation    Objective:   VITALS:    Visit Vitals  BP (!) 107/59   Pulse (!) 56   Temp 97.5 °F (36.4 °C)   Resp 10   Ht 5' 2\" (1.575 m)   Wt 64.9 kg (143 lb 1.3 oz)   SpO2 100%   BMI 26.17 kg/m²       PHYSICAL EXAM:    General:    Alert, cooperative, no distress, appears stated age. HEENT: Atraumatic, anicteric sclerae, pink conjunctivae     No oral ulcers, mucosa moist, throat clear, dentition fair  Neck:  Supple, symmetrical,  thyroid: non tender  Lungs:   Clear to auscultation bilaterally. No Wheezing or Rhonchi. No rales. Chest wall:  No tenderness  No Accessory muscle use. Heart:   Regular  rhythm,  No  murmur   No edema  Abdomen:   Soft, lower abdominal tenderness, no guarding or rigidity  Extremities: No cyanosis. No clubbing,      Skin turgor normal, Capillary refill normal, Radial dial pulse 2+  Skin:     Not pale. Not Jaundiced  No rashes   Psych:  Good insight. Not depressed. Not anxious or agitated. Neurologic: EOMs intact. No facial asymmetry.  No aphasia or slurred speech. Symmetrical strength, Sensation grossly intact. Alert and oriented X 4.     _______________________________________________________________________  Care Plan discussed with:    Comments   Patient y    Family      RN y    Care Manager                    Consultant:      _______________________________________________________________________  Expected  Disposition:   Home with Family y   HH/PT/OT/RN    SNF/LTC    KERRY    ________________________________________________________________________  TOTAL TIME:  61  Minutes    Critical Care Provided     Minutes non procedure based      Comments    y Reviewed previous records   >50% of visit spent in counseling and coordination of care y Discussion with patient and/or family and questions answered       ________________________________________________________________________  Signed: Milena Cortez MD    Procedures: see electronic medical records for all procedures/Xrays and details which were not copied into this note but were reviewed prior to creation of Plan. LAB DATA REVIEWED:    Recent Results (from the past 24 hour(s))   CBC WITH AUTOMATED DIFF    Collection Time: 05/07/21  5:08 PM   Result Value Ref Range    WBC 14.8 (H) 3.6 - 11.0 K/uL    RBC 4.78 3.80 - 5.20 M/uL    HGB 14.8 11.5 - 16.0 g/dL    HCT 42.5 35.0 - 47.0 %    MCV 88.9 80.0 - 99.0 FL    MCH 31.0 26.0 - 34.0 PG    MCHC 34.8 30.0 - 36.5 g/dL    RDW 13.2 11.5 - 14.5 %    PLATELET 892 745 - 460 K/uL    MPV 10.0 8.9 - 12.9 FL    NRBC 0.0 0  WBC    ABSOLUTE NRBC 0.00 0.00 - 0.01 K/uL    NEUTROPHILS 76 (H) 32 - 75 %    LYMPHOCYTES 16 12 - 49 %    MONOCYTES 6 5 - 13 %    EOSINOPHILS 1 0 - 7 %    BASOPHILS 0 0 - 1 %    IMMATURE GRANULOCYTES 1 (H) 0.0 - 0.5 %    ABS. NEUTROPHILS 11.3 (H) 1.8 - 8.0 K/UL    ABS. LYMPHOCYTES 2.3 0.8 - 3.5 K/UL    ABS. MONOCYTES 0.8 0.0 - 1.0 K/UL    ABS. EOSINOPHILS 0.2 0.0 - 0.4 K/UL    ABS. BASOPHILS 0.1 0.0 - 0.1 K/UL    ABS. IMM.  GRANS. 0.1 (H) 0.00 - 0.04 K/UL    DF AUTOMATED     METABOLIC PANEL, COMPREHENSIVE    Collection Time: 05/07/21  5:08 PM   Result Value Ref Range    Sodium 124 (L) 136 - 145 mmol/L    Potassium 4.9 3.5 - 5.1 mmol/L    Chloride 94 (L) 97 - 108 mmol/L    CO2 24 21 - 32 mmol/L    Anion gap 6 5 - 15 mmol/L    Glucose 98 65 - 100 mg/dL    BUN 16 6 - 20 MG/DL    Creatinine 1.75 (H) 0.55 - 1.02 MG/DL    BUN/Creatinine ratio 9 (L) 12 - 20      GFR est AA 34 (L) >60 ml/min/1.73m2    GFR est non-AA 28 (L) >60 ml/min/1.73m2    Calcium 10.1 8.5 - 10.1 MG/DL    Bilirubin, total 0.7 0.2 - 1.0 MG/DL    ALT (SGPT) 23 12 - 78 U/L    AST (SGOT) 17 15 - 37 U/L    Alk.  phosphatase 70 45 - 117 U/L    Protein, total 7.9 6.4 - 8.2 g/dL    Albumin 4.1 3.5 - 5.0 g/dL    Globulin 3.8 2.0 - 4.0 g/dL    A-G Ratio 1.1 1.1 - 2.2     LIPASE    Collection Time: 05/07/21  5:08 PM   Result Value Ref Range    Lipase 94 73 - 393 U/L   URINALYSIS W/ REFLEX CULTURE    Collection Time: 05/07/21  7:42 PM    Specimen: Urine   Result Value Ref Range    Color YELLOW/STRAW      Appearance CLEAR CLEAR      Specific gravity 1.011 1.003 - 1.030      pH (UA) 6.5 5.0 - 8.0      Protein Negative NEG mg/dL    Glucose Negative NEG mg/dL    Ketone Negative NEG mg/dL    Bilirubin Negative NEG      Blood Negative NEG      Urobilinogen 1.0 0.2 - 1.0 EU/dL    Nitrites Negative NEG      Leukocyte Esterase SMALL (A) NEG      WBC 10-20 0 - 4 /hpf    RBC 0-5 0 - 5 /hpf    Epithelial cells FEW FEW /lpf    Bacteria Negative NEG /hpf    UA:UC IF INDICATED URINE CULTURE ORDERED (A) CNI      Hyaline cast 0-2 0 - 5 /lpf

## 2021-05-08 NOTE — PROGRESS NOTES
Pharmacy Automatic Renal Dosing Protocol - Antimicrobials    Indication for Antimicrobials: intraabdominal infection     Current Regimen of Each Antimicrobial:  Levofloxacin 750 mg IV Q24H (Start Date 21; Day # 1)  Metronidazole 500 mg IV Q8H (Start Date 21; Day #1)    Previous Antimicrobial Therapy:    Significant Cultures:   NA    Radiology / Imaging results: (X-ray, CT scan or MRI): NA    Paralysis, amputations, malnutrition: NA    Labs:  Recent Labs     21  1708   CREA 1.75*   BUN 16   WBC 14.8*     Temp (24hrs), Av.5 °F (36.4 °C), Min:97.5 °F (36.4 °C), Max:97.5 °F (36.4 °C)    Is the Patient on Dialysis? No    Creatinine Clearance (mL/min):   CrCl (Actual Body Weight): 25.8  CrCl (Adjusted Body Weight): 22.3  CrCl (Ideal Body Weight): 19.9    Impression/Plan:   Levofloxacin adjusted to 750 mg IV Q48H per protocol  Antimicrobial stop date TBD     Pharmacy will follow daily and adjust medications as appropriate for renal function and/or serum levels. Thank you,  Alberto Spangler, PHARMD    Recommended duration of therapy  http://[facility]/[place]/[facility]/Pharmacy/Clinical%20Companion/Duration%20of%[email or web address]. docx    Renal Dosing  http://[facility]/[place]/[facility]/Pharmacy/Clinical%20Companion/Renal%[id number]. pdf

## 2021-05-08 NOTE — PROGRESS NOTES
2330- pt arrived to room from ED via wheelchair. No complaints of pain at this time, assessment and admission database completed    End of Shift Note    Bedside shift change report given to Tamanna Park RN (oncoming nurse) by Tj Giron RN (offgoing nurse). Report included the following information SBAR, Kardex and MAR    Shift worked:  [id number]     Shift summary and any significant changes:     see above. No complaints of pain overnight, pt has been NPO since arriving to the floor. Pt was confused around 0330, stating she couldn't remember why she was here and why she came to the ED. This RN reoriented pt, but she said she still felt \"fuzzy\". Pt rested well. Concerns for physician to address:       Zone phone for oncoming shift:   [phone]       Activity:  Activity Level: Up with Assistance  Number times ambulated in hallways past shift: 0  Number of times OOB to chair past shift: 0    Cardiac:   Cardiac Monitoring: Yes      Cardiac Rhythm: Sinus Shay    Access:   Current line(s): PIV     Genitourinary:   Urinary status: voiding    Respiratory:   O2 Device: None (Room air)  Chronic home O2 use?: NO  Incentive spirometer at bedside: NO     GI:  Last Bowel Movement Date: 05/06/21  Current diet:  DIET NPO Except Meds  Passing flatus: YES  Tolerating current diet: NO (NPO)       Pain Management:   Patient states pain is manageable on current regimen: YES    Skin:  Sergio Score: 20  Interventions: increase time out of bed    Patient Safety:  Fall Score:  Total Score: 1  Interventions: gripper socks and pt to call before getting OOB       Length of Stay:  Expected LOS: - - -  Actual LOS: [address], RN

## 2021-05-08 NOTE — ED NOTES
Patient is being transferred to [facility], Room # 2105. Report given to JAMESON Finley on [employer] for routine progression of care. Report consisted of the following information SBAR, ED Summary, Intake/Output, MAR and Recent Results. Patient transferred to receiving unit by: ED TEch (RN or tech name). Outstanding consults needed: No     Next labs due: No     The following personal items will be sent with the patient during transfer to the floor: All valuables:    Cardiac monitoring ordered: Yes    The following CURRENT information was reported to the receiving RN:    Code status: Full Code at time of transfer    Last set of vital signs:  Vital Signs  Level of Consciousness: Alert (0) (05/07/21 1649)  Temp: 97.5 °F (36.4 °C) (05/07/21 2234)  Temp Source: Oral (05/07/21 2234)  Pulse (Heart Rate): (!) 57 (05/07/21 2252)  Resp Rate: 12 (05/07/21 2252)  BP: (!) 93/59 (05/07/21 2252)  MAP (Monitor): 65 (05/07/21 2252)  MAP (Calculated): 70 (05/07/21 2252)  BP 1 Location: Left upper arm (05/07/21 1649)  BP 1 Method: Automatic (05/07/21 1649)  BP Patient Position: At rest (05/07/21 1649)  MEWS Score: 1 (05/07/21 1649)         Oxygen Therapy  O2 Sat (%): 99 % (05/07/21 2252)  Pulse via Oximetry: 53 beats per minute (05/07/21 2252)  O2 Device: None (Room air) (05/07/21 1649)      Last pain assessment:  Pain 1  Pain Scale 1: Numeric (0 - 10)  Pain Intensity 1: 0  Patient Stated Pain Goal: 0      Wounds: No     Urinary catheter: voiding  Is there a cordoba order: No     LDAs:       Peripheral IV 05/07/21 Left Antecubital (Active)         Opportunity for questions and clarification was provided.     Mindy Baron RN

## 2021-05-08 NOTE — PROGRESS NOTES
Problem: Falls - Risk of  Goal: *Absence of Falls  Description: Document Gia Zamora Fall Risk and appropriate interventions in the flowsheet.   Outcome: Progressing Towards Goal  Note: Fall Risk Interventions:            Medication Interventions: Patient to call before getting OOB

## 2021-05-08 NOTE — PROGRESS NOTES
Hospitalist Progress Note    NAME: Juana Kim   :  1940   MRN:  [id number]     I reviewed pertinent labs/imaging and discussed plan of care with Attending Physician who is in agreement. Assessment / Plan:  Acute sigmoid diverticulitis  CT abdomen/pelvis 21:  1. Colonic diverticulosis. Sigmoid diverticulitis. No pericolonic abscess demonstrated. 2.  Peripheral subpleural reticular densities in right middle lobe and inferior lingula consistent with interstitial process which should be correlated clinically. 3.  Status post cholecystectomy. 4.  Small bilateral inguinal hernias containing fat. - Abdominal pain improved. - Keep npo for now. - Continue current abx (levofloxacin/metronidazole). KATI, likely prerenal 2/2 dehydration  Hyponatremia likely 2/2 dehydration   - Continue IVF. - Avoid nephrotoxins.  - Adjust meds based on CrCl. - Monitor. RML and inferior lingula infiltreate  - Incidental finding on CT abdomen/pelvis. - Check PA/LAT CXR.  - Check procalcitonin in a.m.     DM II  - Continue to hold oral diabetes meds. - Add FSBS with SSI correction scale. HTN  Dyslipidemia   - BP remains on the low side. - Continue to hold losartan and spironolactone 2/2 KATI. - Continue atorvastatin 20 mg po daily. GERD  - Continue pantoprazole 40 mg po daily. 25.0 - 29.9 Overweight / Body mass index is 26.17 kg/m². Estimated discharge date: TBD  Barriers:  None    Code status: Full  Prophylaxis: Lovenox  Recommended Disposition: Home w/Family     Subjective:     Chief Complaint / Reason for Physician Visit  Complains of intermittent lower abdominal pain, mild in severity. Plan of care and pertinent events reviewed with bedside nurse.      Review of Systems:  Symptom Y/N Comments  Symptom Y/N Comments   Fever/Chills N   Chest Pain N    Poor Appetite  NPO  Edema N    Cough N   Abdominal Pain N    Sputum N   Joint Pain N    SOB/AMADO N   Pruritis/Rash N Nausea/vomit N   Tolerating PT/OT     Diarrhea N   Tolerating Diet  NPO   Constipation N   Other       Could NOT obtain due to:      Objective:     VITALS:   Last 24hrs VS reviewed since prior progress note. Most recent are:  Patient Vitals for the past 24 hrs:   Temp Pulse Resp BP SpO2   05/08/21 1518 97.5 °F (36.4 °C) (!) 58 14 112/69 99 %   05/08/21 1223 97.8 °F (36.6 °C) (!) 56 14 (!) 122/59 99 %   05/08/21 0821 97.6 °F (36.4 °C) (!) 58 18 127/62 100 %   05/08/21 0334 97.8 °F (36.6 °C) (!) 58 16 (!) 114/57 98 %   05/07/21 2350 97.8 °F (36.6 °C) (!) 51 16 105/60 98 %   05/07/21 2252  (!) 57 12 (!) 93/59 99 %   05/07/21 2234 97.5 °F (36.4 °C) (!) 59 12 (!) 97/50 99 %   05/07/21 2200  (!) 57 14 (!) 95/58 97 %   05/07/21 2100  (!) 54 11 (!) 106/52 100 %   05/07/21 2030  (!) 56 10 (!) 107/59 100 %   05/07/21 2026    94/63    05/07/21 1930  (!) 53 13 (!) 99/56 97 %   05/07/21 1900  (!) 54 14 (!) 100/54 98 %   05/07/21 1830  (!) 53 12 (!) 106/55 100 %   05/07/21 1800  (!) 55 10 (!) 105/54    05/07/21 1730  (!) 56 11 (!) 96/58 100 %   05/07/21 1714    (!) 97/59 98 %   05/07/21 1649 97.5 °F (36.4 °C) (!) 55 15 125/64 100 %       Intake/Output Summary (Last 24 hours) at 5/8/2021 1624  Last data filed at 5/8/2021 0334  Gross per 24 hour   Intake 2720 ml   Output    Net 2720 ml        I had a face to face encounter and independently examined this patient on 5/8/2021, as outlined below:  PHYSICAL EXAM:  General:  A/A/O X 3. NAD. HEENT:  Normocephalic. Sclera anicteric. EOMI. Mucous membranes moist.    Chest:  Resps even/unlabored with symmetrical CWE. Air entry full. Lungs CTA. No use of accessory muscles. CV:  RRR. Normal S1/S2. No M/C/R appreciated. No JVD. No peripheral edema. GI:  Abdomen soft/ND. Slightly TTP over LLQ. No organomegaly. No hernia. ABT X 4.    :  Voiding. Neurologic:  Nonfocal.  CN II-XII grossly intact.   Face symmetrical.  Speech normal.     Psych: Cooperative. No anxiety or agitation. No suicidal/homicidal ideation. Skin:  No rashes or jaundice. Reviewed most current lab test results and cultures  YES  Reviewed most current radiology test results   YES  Review and summation of old records today    NO  Reviewed patient's current orders and MAR    YES  PMH/SH reviewed - no change compared to H&P  ________________________________________________________________________  Care Plan discussed with:    Comments   Patient [address]     Consultant                        Multidiciplinary team rounds were held today with , nursing, pharmacist and clinical coordinator. Patient's plan of care was discussed; medications were reviewed and discharge planning was addressed. ________________________________________________________________________  Jack Goss NP     Procedures: see electronic medical records for all procedures/Xrays and details which were not copied into this note but were reviewed prior to creation of Plan. LABS:  I reviewed today's most current labs and imaging studies.   Pertinent labs include:  Recent Labs     05/08/21  0308 05/07/21  1708   WBC 8.3 14.8*   HGB 12.7 14.8   HCT 36.9 42.5    181     Recent Labs     05/08/21  0550 05/07/21  1708   * 124*   K 4.4 4.9    94*   CO2 23 24   GLU 86 98   BUN 14 16   CREA 1.53* 1.75*   CA 9.1 10.1   ALB  --  4.1   TBILI  --  0.7   ALT  --  23       Signed: Jack Goss NP

## 2021-05-08 NOTE — PROGRESS NOTES
Problem: Falls - Risk of 
Goal: *Absence of Falls Description: Document Judy Shelton Fall Risk and appropriate interventions in the flowsheet. 5/8/2021 0059 by Adam Goel RN Outcome: Progressing Towards Goal 
Note: Fall Risk Interventions: 
  
 
  
 
Medication Interventions: Patient to call before getting OOB 
 
  
 
  
 
 
5/8/2021 0058 by Adam Goel RN Outcome: Progressing Towards Goal 
Note: Fall Risk Interventions: 
  
 
  
 
Medication Interventions: Patient to call before getting OOB Problem: Patient Education: Go to Patient Education Activity Goal: Patient/Family Education 5/8/2021 0059 by Adam Goel RN Outcome: Progressing Towards Goal 
5/8/2021 0058 by Adam Goel RN Outcome: Progressing Towards Goal

## 2021-05-08 NOTE — PROGRESS NOTES
Nutrition Note    MST triggered for previous EN/TPN. Chart reviewed. No hx of nutrition support found. Pt disoriented, may not be a good historian. Will discontinue MST as this is likely an error. Will plans to see per LOS protocol. Please consult it pt needs to evaluated sooner. Thank you!     Electronically signed by Paolo Enriquez RD, [address]  on 5/8/2021 at 11:56 AM    Contact: [phone]

## 2021-05-08 NOTE — PROGRESS NOTES
Problem: Falls - Risk of  Goal: *Absence of Falls  Description: Document Bessy Supriya Fall Risk and appropriate interventions in the flowsheet.   Outcome: Progressing Towards Goal  Note: Fall Risk Interventions:            Medication Interventions: Patient to call before getting OOB                   Problem: Patient Education: Go to Patient Education Activity  Goal: Patient/Family Education  Outcome: Progressing Towards Goal

## 2021-05-09 LAB
ANION GAP SERPL CALC-SCNC: 6 MMOL/L (ref 5–15)
BACTERIA SPEC CULT: NORMAL
BUN SERPL-MCNC: 11 MG/DL (ref 6–20)
BUN/CREAT SERPL: 9 (ref 12–20)
CALCIUM SERPL-MCNC: 8.7 MG/DL (ref 8.5–10.1)
CC UR VC: NORMAL
CHLORIDE SERPL-SCNC: 105 MMOL/L (ref 97–108)
CO2 SERPL-SCNC: 21 MMOL/L (ref 21–32)
CREAT SERPL-MCNC: 1.26 MG/DL (ref 0.55–1.02)
GLUCOSE BLD STRIP.AUTO-MCNC: 73 MG/DL (ref 65–100)
GLUCOSE BLD STRIP.AUTO-MCNC: 86 MG/DL (ref 65–100)
GLUCOSE BLD STRIP.AUTO-MCNC: 90 MG/DL (ref 65–100)
GLUCOSE BLD STRIP.AUTO-MCNC: 97 MG/DL (ref 65–100)
GLUCOSE SERPL-MCNC: 90 MG/DL (ref 65–100)
POTASSIUM SERPL-SCNC: 4 MMOL/L (ref 3.5–5.1)
PROCALCITONIN SERPL-MCNC: <0.05 NG/ML
SERVICE CMNT-IMP: NORMAL
SODIUM SERPL-SCNC: 132 MMOL/L (ref 136–145)

## 2021-05-09 PROCEDURE — 65660000000 HC RM CCU STEPDOWN

## 2021-05-09 PROCEDURE — 74011250636 HC RX REV CODE- 250/636: Performed by: INTERNAL MEDICINE

## 2021-05-09 PROCEDURE — 82962 GLUCOSE BLOOD TEST: CPT

## 2021-05-09 PROCEDURE — 36415 COLL VENOUS BLD VENIPUNCTURE: CPT

## 2021-05-09 PROCEDURE — 84145 PROCALCITONIN (PCT): CPT

## 2021-05-09 PROCEDURE — 74011250637 HC RX REV CODE- 250/637: Performed by: INTERNAL MEDICINE

## 2021-05-09 PROCEDURE — 74011250636 HC RX REV CODE- 250/636: Performed by: GENERAL ACUTE CARE HOSPITAL

## 2021-05-09 PROCEDURE — 80048 BASIC METABOLIC PNL TOTAL CA: CPT

## 2021-05-09 RX ADMIN — SODIUM CHLORIDE 100 ML/HR: 9 INJECTION, SOLUTION INTRAVENOUS at 21:15

## 2021-05-09 RX ADMIN — Medication 10 ML: at 14:00

## 2021-05-09 RX ADMIN — SODIUM CHLORIDE 100 ML/HR: 9 INJECTION, SOLUTION INTRAVENOUS at 03:31

## 2021-05-09 RX ADMIN — METRONIDAZOLE 500 MG: 500 INJECTION, SOLUTION INTRAVENOUS at 23:22

## 2021-05-09 RX ADMIN — LEVOFLOXACIN 750 MG: 5 INJECTION, SOLUTION INTRAVENOUS at 09:12

## 2021-05-09 RX ADMIN — Medication 10 ML: at 06:00

## 2021-05-09 RX ADMIN — ATORVASTATIN CALCIUM 20 MG: 20 TABLET, FILM COATED ORAL at 21:15

## 2021-05-09 RX ADMIN — Medication 10 ML: at 21:15

## 2021-05-09 RX ADMIN — PANTOPRAZOLE SODIUM 40 MG: 40 TABLET, DELAYED RELEASE ORAL at 06:25

## 2021-05-09 RX ADMIN — METRONIDAZOLE 500 MG: 500 INJECTION, SOLUTION INTRAVENOUS at 11:55

## 2021-05-09 RX ADMIN — ENOXAPARIN SODIUM 30 MG: 100 INJECTION SUBCUTANEOUS at 09:12

## 2021-05-09 NOTE — PROGRESS NOTES
End of Shift Note    Bedside shift change report given to [employer] (oncoming nurse) by Jordi Henriquez (offgoing nurse). Report included the following information SBAR    Shift worked:  7a-7p     Shift summary and any significant changes:     None     Concerns for physician to address:  None     Zone phone for oncoming shift:   [phone]       Activity:  Activity Level: Up with Assistance  Number times ambulated in hallways past shift: 0  Number of times OOB to chair past shift: 0    Cardiac:   Cardiac Monitoring: Yes      Cardiac Rhythm: Sinus Shay    Access:   Current line(s): PIV     Genitourinary:   Urinary status: voiding    Respiratory:   O2 Device: None (Room air)  Chronic home O2 use?: NO  Incentive spirometer at bedside: NO     GI:  Last Bowel Movement Date: 05/06/21  Current diet:  DIET NPO Except Meds  Passing flatus: YES  Tolerating current diet: YES       Pain Management:   Patient states pain is manageable on current regimen: YES    Skin:  Sergio Score: 20  Interventions: increase time out of bed    Patient Safety:  Fall Score:  Total Score: 1  Interventions: pt to call before getting OOB       Length of Stay:  Expected LOS: - - -  Actual LOS: [address]

## 2021-05-09 NOTE — PROGRESS NOTES
Hospitalist Progress Note    NAME: Danilo Zamora   :  1940   MRN:  [id number]     I reviewed pertinent labs/imaging and discussed plan of care with Attending Physician who is in agreement. Assessment / Plan:  Acute sigmoid diverticulitis  CT abdomen/pelvis 21:  1. Colonic diverticulosis. Sigmoid diverticulitis. No pericolonic abscess demonstrated. 2.  Peripheral subpleural reticular densities in right middle lobe and inferior lingula consistent with interstitial process which should be correlated clinically. 3.  Status post cholecystectomy. 4.  Small bilateral inguinal hernias containing fat. - Abdominal pain improved. - Advance to clear liquid diet. - Continue current abx (levofloxacin/metronidazole). KATI, likely prerenal 2/2 dehydration  Hyponatremia likely 2/2 dehydration   - Renal function improving.   - Serum Na+ relatively stable over past 24h. - Continue IVF. - Continue to avoid nephrotoxins.  - Adjust meds based on CrCl. - Monitor. RML and inferior lingula infiltreate  - Incidental finding on CT abdomen/pelvis. - PA/LAT CXR  pending.  - Procalcitonin negative. DM II  - Continue to hold oral diabetes meds. - Continue FSBS with SSI correction scale. HTN  Dyslipidemia   - BP improved   - Continue to hold losartan and spironolactone 2/2 KATI. - Continue atorvastatin 20 mg po daily. GERD  - Continue pantoprazole 40 mg po daily. 25.0 - 29.9 Overweight / Body mass index is 26.17 kg/m². Estimated discharge date: TBD  Barriers:  None    Code status: Full  Prophylaxis: Lovenox  Recommended Disposition: Home w/Family     Subjective:     Chief Complaint / Reason for Physician Visit  Minimal lower abdominal pain today. Plan of care and pertinent events reviewed with bedside nurse.      Review of Systems:  Symptom Y/N Comments  Symptom Y/N Comments   Fever/Chills N   Chest Pain N    Poor Appetite N   Edema N    Cough N   Abdominal Pain N Sputum N   Joint Pain N    SOB/AMADO N   Pruritis/Rash N    Nausea/vomit N   Tolerating PT/OT     Diarrhea N   Tolerating Diet N    Constipation N   Other       Could NOT obtain due to:      Objective:     VITALS:   Last 24hrs VS reviewed since prior progress note. Most recent are:  Patient Vitals for the past 24 hrs:   Temp Pulse Resp BP SpO2   05/09/21 1212 97.5 °F (36.4 °C) 66 15 (!) 144/68 100 %   05/09/21 0746 97.6 °F (36.4 °C) (!) 58 15 134/69 99 %   05/09/21 0400  (!) 52      05/09/21 0332 97.7 °F (36.5 °C) (!) 52 15 (!) 140/70 99 %   05/09/21 0000  64      05/08/21 2035 97.9 °F (36.6 °C) 60 15 123/72 98 %   05/08/21 2000  (!) 57      05/08/21 1518 97.5 °F (36.4 °C) (!) 58 14 112/69 99 %       Intake/Output Summary (Last 24 hours) at 5/9/2021 1241  Last data filed at 5/9/2021 6217  Gross per 24 hour   Intake 1485.63 ml   Output    Net 1485.63 ml        I had a face to face encounter and independently examined this patient on 5/9/2021, as outlined below:  PHYSICAL EXAM:  General:  A/A/O X 3. NAD. HEENT:  Normocephalic. Sclera anicteric. EOMI. Mucous membranes moist.    Chest:  Resps even/unlabored with symmetrical CWE. Air entry full. Lungs CTA. No use of accessory muscles. CV:  RRR. Normal S1/S2. No M/C/R appreciated. No JVD. No peripheral edema. GI:  Abdomen soft/ND. Slightly TTP over LLQ. No organomegaly. No hernia. ABT X 4.    :  Voiding. Neurologic:  Nonfocal.  CN II-XII grossly intact. Face symmetrical.  Speech normal.     Psych:  Cooperative. No anxiety or agitation. No suicidal/homicidal ideation. Skin:  No rashes or jaundice.       Reviewed most current lab test results and cultures  YES  Reviewed most current radiology test results   YES  Review and summation of old records today    NO  Reviewed patient's current orders and MAR    YES  PMH/SH reviewed - no change compared to H&P  ________________________________________________________________________  Care Plan discussed with:    Comments   Patient Y    Family      RN Y    Care Manager     Consultant                        Multidiciplinary team rounds were held today with , nursing, pharmacist and clinical coordinator. Patient's plan of care was discussed; medications were reviewed and discharge planning was addressed. ________________________________________________________________________  Crissy Partida NP     Procedures: see electronic medical records for all procedures/Xrays and details which were not copied into this note but were reviewed prior to creation of Plan. LABS:  I reviewed today's most current labs and imaging studies.   Pertinent labs include:  Recent Labs     05/08/21  0308 05/07/21  1708   WBC 8.3 14.8*   HGB 12.7 14.8   HCT 36.9 42.5    181     Recent Labs     05/09/21  0333 05/08/21  0550 05/07/21  1708   * 133* 124*   K 4.0 4.4 4.9    103 94*   CO2 21 23 24   GLU 90 86 98   BUN 11 14 16   CREA 1.26* 1.53* 1.75*   CA 8.7 9.1 10.1   ALB  --   --  4.1   TBILI  --   --  0.7   ALT  --   --  23       Signed: Crissy Partida NP

## 2021-05-09 NOTE — PROGRESS NOTES
Problem: Falls - Risk of  Goal: *Absence of Falls  Description: Document Merrick Romann Fall Risk and appropriate interventions in the flowsheet. Outcome: Progressing Towards Goal  Note: Fall Risk Interventions:            Medication Interventions: Teach patient to arise slowly                   Problem: Patient Education: Go to Patient Education Activity  Goal: Patient/Family Education  Outcome: Progressing Towards Goal   Patient is alert and oriented; able to voice needs. Up to the bathroom with minimal assist. No acute distress noted.

## 2021-05-10 ENCOUNTER — APPOINTMENT (OUTPATIENT)
Dept: GENERAL RADIOLOGY | Age: 81
DRG: 392 | End: 2021-05-10
Attending: NURSE PRACTITIONER
Payer: MEDICARE

## 2021-05-10 VITALS
DIASTOLIC BLOOD PRESSURE: 70 MMHG | HEIGHT: 62 IN | TEMPERATURE: 98.1 F | BODY MASS INDEX: 26.33 KG/M2 | WEIGHT: 143.08 LBS | OXYGEN SATURATION: 100 % | HEART RATE: 63 BPM | SYSTOLIC BLOOD PRESSURE: 146 MMHG | RESPIRATION RATE: 18 BRPM

## 2021-05-10 LAB
ANION GAP SERPL CALC-SCNC: 8 MMOL/L (ref 5–15)
BUN SERPL-MCNC: 8 MG/DL (ref 6–20)
BUN/CREAT SERPL: 6 (ref 12–20)
CALCIUM SERPL-MCNC: 8.7 MG/DL (ref 8.5–10.1)
CHLORIDE SERPL-SCNC: 107 MMOL/L (ref 97–108)
CO2 SERPL-SCNC: 20 MMOL/L (ref 21–32)
CREAT SERPL-MCNC: 1.26 MG/DL (ref 0.55–1.02)
GLUCOSE BLD STRIP.AUTO-MCNC: 112 MG/DL (ref 65–100)
GLUCOSE BLD STRIP.AUTO-MCNC: 93 MG/DL (ref 65–100)
GLUCOSE SERPL-MCNC: 97 MG/DL (ref 65–100)
POTASSIUM SERPL-SCNC: 3.9 MMOL/L (ref 3.5–5.1)
SERVICE CMNT-IMP: ABNORMAL
SERVICE CMNT-IMP: NORMAL
SODIUM SERPL-SCNC: 135 MMOL/L (ref 136–145)

## 2021-05-10 PROCEDURE — 74011250637 HC RX REV CODE- 250/637: Performed by: INTERNAL MEDICINE

## 2021-05-10 PROCEDURE — 80048 BASIC METABOLIC PNL TOTAL CA: CPT

## 2021-05-10 PROCEDURE — 74011250636 HC RX REV CODE- 250/636: Performed by: INTERNAL MEDICINE

## 2021-05-10 PROCEDURE — 36415 COLL VENOUS BLD VENIPUNCTURE: CPT

## 2021-05-10 PROCEDURE — 74011250636 HC RX REV CODE- 250/636: Performed by: GENERAL ACUTE CARE HOSPITAL

## 2021-05-10 PROCEDURE — 71046 X-RAY EXAM CHEST 2 VIEWS: CPT

## 2021-05-10 PROCEDURE — 82962 GLUCOSE BLOOD TEST: CPT

## 2021-05-10 RX ORDER — LEVOFLOXACIN 750 MG/1
750 TABLET ORAL EVERY OTHER DAY
Qty: 2 TAB | Refills: 0 | Status: SHIPPED | OUTPATIENT
Start: 2021-05-11 | End: 2021-06-25

## 2021-05-10 RX ORDER — METRONIDAZOLE 500 MG/1
500 TABLET ORAL 3 TIMES DAILY
Qty: 14 TAB | Refills: 0 | Status: SHIPPED | OUTPATIENT
Start: 2021-05-10 | End: 2021-06-25

## 2021-05-10 RX ADMIN — PANTOPRAZOLE SODIUM 40 MG: 40 TABLET, DELAYED RELEASE ORAL at 09:56

## 2021-05-10 RX ADMIN — SODIUM CHLORIDE 100 ML/HR: 9 INJECTION, SOLUTION INTRAVENOUS at 05:03

## 2021-05-10 RX ADMIN — Medication 10 ML: at 05:08

## 2021-05-10 RX ADMIN — ENOXAPARIN SODIUM 30 MG: 100 INJECTION SUBCUTANEOUS at 09:56

## 2021-05-10 RX ADMIN — METRONIDAZOLE 500 MG: 500 INJECTION, SOLUTION INTRAVENOUS at 11:54

## 2021-05-10 RX ADMIN — Medication 10 ML: at 13:34

## 2021-05-10 NOTE — DISCHARGE SUMMARY
Hospitalist Discharge Summary     Patient ID:  Raeann Sy  [id number]  77 y.o.  1940 5/7/2021    PCP on record: Odilia Jeffries MD    Admit date: 5/7/2021  Discharge date and time: 5/10/2021    DISCHARGE DIAGNOSIS:  Acute sigmoid diverticulitis  KATI, likely prerenal 2/2 dehydration  Hyponatremia likely 2/2 dehydration   RML and inferior lingula infiltreate  DM II  HTN  Dyslipidemia   GERD      CONSULTATIONS:  None    Excerpted HPI from H&P of Jonathan Daugherty MD:  Demetrius Gay is a 79 y.o.   female who presents with past medical history of hypertension, dyslipidemia, diabetes mellitus is coming the hospital chief complaints of lower abdominal pain. Patient reports pain is mostly in the lower abdomen about 3 x 10, nonradiating, without any aggravating or relieving factors. Reports mild nausea. Does not report any chest pain or shortness of breath.     On arrival to ED, she was borderline bradycardic, blood pressure was within normal limits. On labs she was noted to have a leukocytosis of 14.8. BMP showed a sodium of 124, creatinine 1.75. Calcium of 10.1. LFTs were within normal limits. CT shows evidence of sigmoid diverticulitis. \"    ______________________________________________________________________  DISCHARGE SUMMARY/HOSPITAL COURSE:  for full details see H&P, daily progress notes, labs, consult notes. Acute sigmoid diverticulitis  CT abdomen/pelvis 05/07/21:  1. Colonic diverticulosis. Sigmoid diverticulitis. No pericolonic abscess demonstrated. 2.  Peripheral subpleural reticular densities in right middle lobe and inferior lingula consistent with interstitial process which should be correlated clinically. 3.  Status post cholecystectomy. 4.  Small bilateral inguinal hernias containing fat.   - Abdominal pain resolved  - Tolerated GI lite diet prior to d/c.  - Discharge on levofloxacin and metronidazole to total 7 day treatment course.       KATI, likely prerenal 2/2 dehydration  Hyponatremia likely 2/2 dehydration   - Renal function improved during hospitalization but not to baseline.  - D/C serum Cr 1.26  - Will need follow with PCP with repeat BMP.      RML and inferior lingula infiltreate  CXR 05/10/21: Minimal pulmonary scarring. No acute disease. No significant interval change. - Incidental finding on CT abdomen/pelvis. - Procalcitonin negative. - No additional tx indicated.      DM II  - Resume home diabetes meds. - Monitor glucose as PTA.      HTN  Dyslipidemia   - Hold losartan at d/c.  - Resume spironolactone. - Continue atorvastatin 20 mg po daily.      GERD  - Resume home PPI. All discharge instructions and discharge medications were reviewed with the patient and patient's daughter who verbalized understanding. At the time of this dictation the patient's vital signs were as follows:  T 98.1, /70, HR 63, RR 18, SpO2 100% on room air.      _______________________________________________________________________  Patient seen and examined by me on discharge day. Pertinent Findings:  General:  A/A/O X 3. NAD. HEENT:  Normocephalic. Sclera anicteric. EOMI. Mucous membranes moist.    Chest:  Resps even/unlabored with symmetrical CWE. Air entry full. Lungs CTA. No use of accessory muscles. CV:  RRR. Normal S1/S2. No M/C/R appreciated. No JVD. No peripheral edema. GI:  Abdomen soft/NT/ND. No organomegaly. No hernia. ABT X 4.    :  Voiding. Neurologic:  Nonfocal.  CN II-XII grossly intact. Face symmetrical.  Speech normal.     Psych:  Cooperative. No anxiety or agitation. No suicidal/homicidal ideation. Skin:  No rashes or jaundice. _______________________________________________________________________  DISCHARGE MEDICATIONS:   Current Discharge Medication List      START taking these medications    Details   levoFLOXacin (LEVAQUIN) 750 mg tablet Take 1 Tab by mouth every other day.   Qty: 2 Tab, Refills: 0  Start date: 5/11/2021      metroNIDAZOLE (FlagyL) 500 mg tablet Take 1 Tab by mouth three (3) times daily. Qty: 14 Tab, Refills: 0  Start date: 5/10/2021         CONTINUE these medications which have NOT CHANGED    Details   spironolactone (ALDACTONE) 25 mg tablet Take 25 mg by mouth daily. risedronate (ACTONEL) 35 mg tablet Take 35 mg by mouth every Tuesday. cyanocobalamin (VITAMIN B-12) 500 mcg tablet Take 500 mcg by mouth every morning. atorvastatin (LIPITOR) 10 mg tablet Take 10 mg by mouth nightly. fexofenadine (ALLEGRA) 180 mg tablet Take 180 mg by mouth nightly. metFORMIN (GLUCOPHAGE) 500 mg tablet Take 500 mg by mouth daily (with breakfast). esomeprazole (NEXIUM) 40 mg capsule Take 40 mg by mouth every morning. CALCIUM POLYCARBOPHIL (FIBERCON PO) Take  by mouth two (2) times a day. CALCIUM CITRATE (CITRACAL PO) Take  by mouth two (2) times a day. aspirin 81 mg tablet Take  by mouth daily. HYDROcodone-acetaminophen (NORCO) 5-325 mg per tablet Take 1 Tab by mouth every four (4) hours as needed for Pain. Max Daily Amount: 6 Tabs. Qty: 40 Tab, Refills: 0    Associated Diagnoses: Hiatal hernia with GERD      atenolol (TENORMIN) 50 mg tablet Take 50 mg by mouth every morning. doxepin (SINEQUAN) 25 mg capsule Take 25 mg by mouth nightly. ERGOCALCIFEROL (VITAMIN D PO) Take 1,000 Units by mouth nightly. STOP taking these medications       losartan (COZAAR) 100 mg tablet Comments:   Reason for Stopping:         cholecalciferol (VITAMIN D3) (1000 Units /25 mcg) tablet Comments:   Reason for Stopping:         olmesartan (BENICAR) 20 mg tablet Comments:   Reason for Stopping:         potassium chloride (KLOR-CON M20) 20 mEq tablet Comments:   Reason for Stopping:                 Patient Follow Up Instructions:    Activity: Activity as tolerated  Diet: Diabetic Diet  Wound Care: None needed    Follow-up as noted below  Follow-up tests/labs: [facility]    Follow-up Information     Follow up With Specialties Details Why Larry Cruz MD Internal Medicine On 5/14/2021 At 11:30 A. M. for hospital follow up [address]  [phone]          ________________________________________________________________    Risk of deterioration: Low    Condition at Discharge:  Stable  __________________________________________________________________    Disposition  Home with family, no needs    ____________________________________________________________________    Code Status: Full Code  ___________________________________________________________________      Total time in minutes spent coordinating this discharge (includes going over instructions, follow-up, prescriptions, and preparing report for sign off to her PCP) :  >30 minutes    Signed:  Sienna Dye NP

## 2021-05-10 NOTE — PROGRESS NOTES
Transition of Care Plan:    RUR: 12  Disposition: Home with Granddaughter  Follow up appointments: PCP appt scheduled with Mary Lou Flores: 5/14/21 at 11:30 AM  DME needed: n/a  Transportation at Discharge: Dima Grayson is here and ready to transport. Keys or means to access home:     yes   IM Medicare letter:  Delivered today. Medicare pt has received, reviewed, and signed 2nd IM letter informing them of their right to appeal the discharge. Signed copy has been placed on pt bedside chart. Caregiver Contact: Caio Ar: [phone]  Discharge Caregiver contacted prior to discharge? Plan reviewed with DTR and Pt in room. NO FURTHER CM NEEDS. Reason for Admission:  Pt was admitted 5/7/21 d/t ABD pain, Diaherrea d/t Acute Sigmoid Diverticulitis. KATI. RUR Score:          12           Plan for utilizing home health:      n/a    PCP: First and Last name:  Petty Leo MD     Name of Practice:    Are you a current patient: Yes/No:  yes   Approximate date of last visit: 2 months   Can you participate in a virtual visit with your PCP:  yes                    Yaquelin Irizarry (ACP) Conversation      Date of Conversation: 05/10/21  Conducted with: Patient with [address] Decision Maker: DTR: Param Crocker: [phone]  DTR: Rocio Lee: [phone]    Content/Action Overview:   DECLINED ACP conversation - will revisit periodically   Reviewed DNR/DNI and patient elects Full Code (Attempt Resuscitation)    Length of Voluntary ACP Conversation in minutes:  <16 minutes (Non-Billable)    [address] [facility] [address] Management Interventions  PCP Verified by CM: Yes  Palliative Care Criteria Met (RRAT>21 & CHF Dx)?: No  Mode of Transport at Discharge:  Other (see comment)  Transition of Care Consult (CM Consult): Discharge Planning  MyChart Signup: No  Discharge Durable Medical Equipment: No  Physical Therapy Consult: No  Occupational Therapy Consult: No  Speech Therapy Consult: No  Current Support Network: Lives with Caregiver  Confirm Follow Up Transport: Family  Discharge Location  Discharge Placement: [address] [facility]  Ext [phone]

## 2021-05-10 NOTE — DISCHARGE INSTRUCTIONS
Patient Education        Diverticulitis: Care Instructions  Overview     Diverticulitis occurs when pouches form in the wall of the colon and become inflamed or infected. It can be very painful. Doctors aren't sure what causes diverticulitis. There is no proof that foods such as nuts, seeds, or berries cause it or make it worse. A low-fiber diet may cause the colon to work harder to push stool forward. Pouches may form because of this extra work. It may be hard to think about healthy eating while you're in pain. But as you recover, you might think about how you can use healthy eating for overall better health. Healthy eating may help you avoid future attacks. Follow-up care is a key part of your treatment and safety. Be sure to make and go to all appointments, and call your doctor if you are having problems. It's also a good idea to know your test results and keep a list of the medicines you take. How can you care for yourself at home? · Drink plenty of fluids. If you have kidney, heart, or liver disease and have to limit fluids, talk with your doctor before you increase the amount of fluids you drink. · Stay with liquids or a bland diet (plain rice, bananas, dry toast or crackers, applesauce) until you are feeling better. Then you can return to regular foods and slowly increase the amount of fiber in your diet. · Use a heating pad set on low on your belly to relieve mild cramps and pain. · Get extra rest until you are feeling better. · Be safe with medicines. Read and follow all instructions on the label. ? If the doctor gave you a prescription medicine for pain, take it as prescribed. ? If you are not taking a prescription pain medicine, ask your doctor if you can take an over-the-counter medicine. · If your doctor prescribed antibiotics, take them as directed. Do not stop taking them just because you feel better. You need to take the full course of antibiotics.   · Do not use laxatives or enemas unless your doctor tells you to use them. When should you call for help? Call your doctor now or seek immediate medical care if:    · You have a fever.     · You are vomiting.     · You have new or worse belly pain.     · You cannot pass stools or gas. Watch closely for changes in your health, and be sure to contact your doctor if you have any problems. Where can you learn more? Go to http://www.gray.com/  Enter H901 in the search box to learn more about \"Diverticulitis: Care Instructions. \"  Current as of: April 15, 2020               Content Version: 12.8  © 7825-3262 [employer]. Care instructions adapted under license by [employer] (which disclaims liability or warranty for this information). If you have questions about a medical condition or this instruction, always ask your healthcare professional. [address] any warranty or liability for your use of this information. HOSPITALIST DISCHARGE INSTRUCTIONS    NAME: Rupali Wills   :  1940   MRN:  [id number]     Date/Time:  5/10/2021 1:49 PM    ADMIT DATE: 2021   DISCHARGE DATE: 5/10/2021     Attending Physician: Ashley Avila NP    DISCHARGE DIAGNOSIS:  Acute sigmoid diverticulitis (infection in the colon)  Acute kidney injury (acute decline in kidney function likely related to dehydration which improved prior to hospital discharge)  Hyponatremia (low blood sodium likely related to dehydration which improved prior to hospital discharge)  Diabetes  Hypertension (elevated blood pressure)  Dyslipidemia (abnormal blood lipids/fats)  Gastroesophageal reflux disease (reflux of stomach contents into the esophagus      Medications: Per above medication reconciliation.     Pain Management: per above medications    Recommended diet: Diabetic Diet    Recommended activity: Activity as tolerated    Wound care: None    Indwelling devices:  None    Supplemental Oxygen: None    Required Lab work: Basic metabolic profile (BMP)    Glucose management:  As you did prior to hospital admission    Code status: Full     Take all discharge paperwork with you to all of your follow up doctor appointments. Take your medications exactly as outlined in your discharge paperwork. Do not take any other medications unless specifically prescribed after your hospital stay. If your symptoms return/worsen seek medical attention immediately. You are being discharged on antibiotics. Antibiotics can cause diarrhea and bacterial infection in the GI tract. In order to reduce the risk of this take probiotics per the package instructions. Should you develop a rash contact your Primary Care Provider immediately. If you develop swelling of the lips, mouth, or throat activate EMS or go to the closest Emergency Room. Hold your FiberCon and Citracal while you are antibiotics. You may resume which antibiotics have completed. Do not use any alcohol (including none over-the-counter medications) while on metronidazole. Outside physician follow up: Follow-up Information     Follow up With Specialties Details Why Contact Info    Emmy Coffman MD Internal Medicine In 1 week Schedule post hospital follow-up to be seen within 1 week of hospital discharge [facility]  [facility]  [phone]            Information obtained by :  I understand that if any problems occur once I am at home I am to contact my physician. I understand and acknowledge receipt of the instructions indicated above.                                                                                                                                            Physician's or R.N.'s Signature                                                                  Date/Time Patient or Representative

## 2021-05-10 NOTE — PROGRESS NOTES
Bedside shift change report given to Helder Bland RN by Em Burns RN. Report included the following information SBAR, ED Summary, Intake/Output and Cardiac Rhythm NSR/sachi. 0800 Called radiology regarding chest xray PA lat ordered on 5/8, order not seen on their end. Order was placed again for today. 1450 I have reviewed discharge instructions, medications, and follow up appts with the patient and patient's daughter. The patient and patient's daughter verbalized understanding. PIV x1 removed without difficulty.

## 2021-06-18 ENCOUNTER — HOSPITAL ENCOUNTER (EMERGENCY)
Age: 81
Discharge: HOME | End: 2021-06-18
Attending: EMERGENCY MEDICINE
Payer: MEDICARE

## 2021-06-18 ENCOUNTER — APPOINTMENT (OUTPATIENT)
Dept: GENERAL RADIOLOGY | Age: 81
End: 2021-06-18
Attending: EMERGENCY MEDICINE
Payer: MEDICARE

## 2021-06-18 ENCOUNTER — APPOINTMENT (OUTPATIENT)
Dept: CT IMAGING | Age: 81
End: 2021-06-18
Attending: EMERGENCY MEDICINE
Payer: MEDICARE

## 2021-06-18 VITALS
HEART RATE: 68 BPM | RESPIRATION RATE: 14 BRPM | SYSTOLIC BLOOD PRESSURE: 148 MMHG | OXYGEN SATURATION: 98 % | TEMPERATURE: 98 F | WEIGHT: 138.89 LBS | BODY MASS INDEX: 25.4 KG/M2 | DIASTOLIC BLOOD PRESSURE: 65 MMHG

## 2021-06-18 DIAGNOSIS — N30.00 ACUTE CYSTITIS WITHOUT HEMATURIA: ICD-10-CM

## 2021-06-18 DIAGNOSIS — R10.84 ABDOMINAL PAIN, GENERALIZED: Primary | ICD-10-CM

## 2021-06-18 DIAGNOSIS — R63.0 POOR APPETITE: ICD-10-CM

## 2021-06-18 LAB
ALBUMIN SERPL-MCNC: 4 G/DL (ref 3.5–5)
ALBUMIN/GLOB SERPL: 1.1 {RATIO} (ref 1.1–2.2)
ALP SERPL-CCNC: 69 U/L (ref 45–117)
ALT SERPL-CCNC: 28 U/L (ref 12–78)
ANION GAP SERPL CALC-SCNC: 5 MMOL/L (ref 5–15)
APPEARANCE UR: CLEAR
AST SERPL-CCNC: 22 U/L (ref 15–37)
ATRIAL RATE: 70 BPM
BACTERIA URNS QL MICRO: NEGATIVE /HPF
BASOPHILS # BLD: 0.1 K/UL (ref 0–0.1)
BASOPHILS NFR BLD: 1 % (ref 0–1)
BILIRUB SERPL-MCNC: 0.7 MG/DL (ref 0.2–1)
BILIRUB UR QL: NEGATIVE
BUN SERPL-MCNC: 11 MG/DL (ref 6–20)
BUN/CREAT SERPL: 9 (ref 12–20)
CALCIUM SERPL-MCNC: 10.2 MG/DL (ref 8.5–10.1)
CALCULATED P AXIS, ECG09: -22 DEGREES
CALCULATED R AXIS, ECG10: -12 DEGREES
CALCULATED T AXIS, ECG11: 53 DEGREES
CHLORIDE SERPL-SCNC: 94 MMOL/L (ref 97–108)
CO2 SERPL-SCNC: 27 MMOL/L (ref 21–32)
COLOR UR: ABNORMAL
CREAT SERPL-MCNC: 1.24 MG/DL (ref 0.55–1.02)
DIAGNOSIS, 93000: NORMAL
DIFFERENTIAL METHOD BLD: ABNORMAL
EOSINOPHIL # BLD: 0.2 K/UL (ref 0–0.4)
EOSINOPHIL NFR BLD: 2 % (ref 0–7)
EPITH CASTS URNS QL MICRO: ABNORMAL /LPF
ERYTHROCYTE [DISTWIDTH] IN BLOOD BY AUTOMATED COUNT: 13.2 % (ref 11.5–14.5)
GLOBULIN SER CALC-MCNC: 3.7 G/DL (ref 2–4)
GLUCOSE SERPL-MCNC: 116 MG/DL (ref 65–100)
GLUCOSE UR STRIP.AUTO-MCNC: NEGATIVE MG/DL
HCT VFR BLD AUTO: 43.6 % (ref 35–47)
HGB BLD-MCNC: 15.3 G/DL (ref 11.5–16)
HGB UR QL STRIP: ABNORMAL
HYALINE CASTS URNS QL MICRO: ABNORMAL /LPF (ref 0–5)
IMM GRANULOCYTES # BLD AUTO: 0.1 K/UL (ref 0–0.04)
IMM GRANULOCYTES NFR BLD AUTO: 1 % (ref 0–0.5)
KETONES UR QL STRIP.AUTO: NEGATIVE MG/DL
LEUKOCYTE ESTERASE UR QL STRIP.AUTO: ABNORMAL
LIPASE SERPL-CCNC: 77 U/L (ref 73–393)
LYMPHOCYTES # BLD: 1.2 K/UL (ref 0.8–3.5)
LYMPHOCYTES NFR BLD: 16 % (ref 12–49)
MCH RBC QN AUTO: 31.4 PG (ref 26–34)
MCHC RBC AUTO-ENTMCNC: 35.1 G/DL (ref 30–36.5)
MCV RBC AUTO: 89.3 FL (ref 80–99)
MONOCYTES # BLD: 0.5 K/UL (ref 0–1)
MONOCYTES NFR BLD: 7 % (ref 5–13)
NEUTS SEG # BLD: 5.8 K/UL (ref 1.8–8)
NEUTS SEG NFR BLD: 74 % (ref 32–75)
NITRITE UR QL STRIP.AUTO: NEGATIVE
NRBC # BLD: 0 K/UL (ref 0–0.01)
NRBC BLD-RTO: 0 PER 100 WBC
P-R INTERVAL, ECG05: 198 MS
PH UR STRIP: 7 [PH] (ref 5–8)
PLATELET # BLD AUTO: 140 K/UL (ref 150–400)
PMV BLD AUTO: 9.8 FL (ref 8.9–12.9)
POTASSIUM SERPL-SCNC: 4.6 MMOL/L (ref 3.5–5.1)
PROT SERPL-MCNC: 7.7 G/DL (ref 6.4–8.2)
PROT UR STRIP-MCNC: NEGATIVE MG/DL
Q-T INTERVAL, ECG07: 388 MS
QRS DURATION, ECG06: 76 MS
QTC CALCULATION (BEZET), ECG08: 419 MS
RBC # BLD AUTO: 4.88 M/UL (ref 3.8–5.2)
RBC #/AREA URNS HPF: ABNORMAL /HPF (ref 0–5)
SODIUM SERPL-SCNC: 126 MMOL/L (ref 136–145)
SP GR UR REFRACTOMETRY: 1.03 (ref 1–1.03)
TROPONIN I SERPL-MCNC: <0.05 NG/ML
UA: UC IF INDICATED,UAUC: ABNORMAL
UROBILINOGEN UR QL STRIP.AUTO: 0.2 EU/DL (ref 0.2–1)
VENTRICULAR RATE, ECG03: 70 BPM
WBC # BLD AUTO: 7.9 K/UL (ref 3.6–11)
WBC URNS QL MICRO: ABNORMAL /HPF (ref 0–4)

## 2021-06-18 PROCEDURE — 84484 ASSAY OF TROPONIN QUANT: CPT

## 2021-06-18 PROCEDURE — 99284 EMERGENCY DEPT VISIT MOD MDM: CPT

## 2021-06-18 PROCEDURE — 80053 COMPREHEN METABOLIC PANEL: CPT

## 2021-06-18 PROCEDURE — 74011250636 HC RX REV CODE- 250/636: Performed by: EMERGENCY MEDICINE

## 2021-06-18 PROCEDURE — 83690 ASSAY OF LIPASE: CPT

## 2021-06-18 PROCEDURE — 71045 X-RAY EXAM CHEST 1 VIEW: CPT

## 2021-06-18 PROCEDURE — 74177 CT ABD & PELVIS W/CONTRAST: CPT

## 2021-06-18 PROCEDURE — 36415 COLL VENOUS BLD VENIPUNCTURE: CPT

## 2021-06-18 PROCEDURE — 96374 THER/PROPH/DIAG INJ IV PUSH: CPT

## 2021-06-18 PROCEDURE — 81001 URINALYSIS AUTO W/SCOPE: CPT

## 2021-06-18 PROCEDURE — 96375 TX/PRO/DX INJ NEW DRUG ADDON: CPT

## 2021-06-18 PROCEDURE — 74011000636 HC RX REV CODE- 636: Performed by: EMERGENCY MEDICINE

## 2021-06-18 PROCEDURE — 93005 ELECTROCARDIOGRAM TRACING: CPT

## 2021-06-18 PROCEDURE — 85025 COMPLETE CBC W/AUTO DIFF WBC: CPT

## 2021-06-18 RX ORDER — CEPHALEXIN 500 MG/1
500 CAPSULE ORAL 4 TIMES DAILY
Qty: 28 CAPSULE | Refills: 0 | Status: SHIPPED | OUTPATIENT
Start: 2021-06-18 | End: 2021-06-25

## 2021-06-18 RX ORDER — ONDANSETRON 2 MG/ML
4 INJECTION INTRAMUSCULAR; INTRAVENOUS
Status: COMPLETED | OUTPATIENT
Start: 2021-06-18 | End: 2021-06-18

## 2021-06-18 RX ORDER — KETOROLAC TROMETHAMINE 30 MG/ML
15 INJECTION, SOLUTION INTRAMUSCULAR; INTRAVENOUS
Status: COMPLETED | OUTPATIENT
Start: 2021-06-18 | End: 2021-06-18

## 2021-06-18 RX ORDER — ONDANSETRON 4 MG/1
4 TABLET, FILM COATED ORAL
Qty: 20 TABLET | Refills: 0 | Status: SHIPPED | OUTPATIENT
Start: 2021-06-18

## 2021-06-18 RX ORDER — DICYCLOMINE HYDROCHLORIDE 20 MG/1
20 TABLET ORAL EVERY 6 HOURS
Qty: 20 TABLET | Refills: 0 | Status: SHIPPED | OUTPATIENT
Start: 2021-06-18

## 2021-06-18 RX ADMIN — IOPAMIDOL 100 ML: 755 INJECTION, SOLUTION INTRAVENOUS at 17:43

## 2021-06-18 RX ADMIN — ONDANSETRON 4 MG: 2 INJECTION INTRAMUSCULAR; INTRAVENOUS at 17:53

## 2021-06-18 RX ADMIN — SODIUM CHLORIDE 500 ML: 9 INJECTION, SOLUTION INTRAVENOUS at 17:56

## 2021-06-18 RX ADMIN — KETOROLAC TROMETHAMINE 15 MG: 30 INJECTION, SOLUTION INTRAMUSCULAR; INTRAVENOUS at 17:53

## 2021-06-18 NOTE — ED NOTES
JAMESON Smith reviewed discharge instructions with the patient. The patient verbalized understanding. All questions and concerns were addressed. The patient declined a wheelchair and is discharged ambulatory in the care of family members with instructions and prescriptions in hand. Pt is alert and oriented x 4. Respirations are clear and unlabored.

## 2021-06-18 NOTE — ED PROVIDER NOTES
EMERGENCY DEPARTMENT HISTORY AND PHYSICAL EXAM      Date: 6/18/2021  Patient Name: Rupali Wills  Patient Age and Sex: 79 y.o. female     History of Presenting Illness     Chief Complaint   Patient presents with    Illness     pt reports since yesterday feeling very fatigued and decreased appetite. Pt reports generalized abd pain that she thinks may be from not eating since lunch yesterday       History Provided By: Patient    HPI: Rupali Wills is an 79-year-old female with a history of diverticulitis presenting with left-sided abdominal pain, fatigue and nausea. Patient states that since last night has been feeling unwell with generalized malaise, fatigue as well as intermittent abdominal pain. Patient states that it feels like it is all over her abdomen but worse on the left side. Patient states that she has not been able to eat anything all day because of the nausea and decreased appetite. Denies any diarrhea, constipation, vomiting, fevers, urinary symptoms. There are no other complaints, changes, or physical findings at this time. PCP: Chalino Driver MD    No current facility-administered medications on file prior to encounter. Current Outpatient Medications on File Prior to Encounter   Medication Sig Dispense Refill    levoFLOXacin (LEVAQUIN) 750 mg tablet Take 1 Tab by mouth every other day. 2 Tab 0    metroNIDAZOLE (FlagyL) 500 mg tablet Take 1 Tab by mouth three (3) times daily. 14 Tab 0    spironolactone (ALDACTONE) 25 mg tablet Take 25 mg by mouth daily.  HYDROcodone-acetaminophen (NORCO) 5-325 mg per tablet Take 1 Tab by mouth every four (4) hours as needed for Pain. Max Daily Amount: 6 Tabs. 40 Tab 0    risedronate (ACTONEL) 35 mg tablet Take 35 mg by mouth every Tuesday.  atenolol (TENORMIN) 50 mg tablet Take 50 mg by mouth every morning.  cyanocobalamin (VITAMIN B-12) 500 mcg tablet Take 500 mcg by mouth every morning.       atorvastatin (LIPITOR) 10 mg tablet Take 10 mg by mouth nightly.  fexofenadine (ALLEGRA) 180 mg tablet Take 180 mg by mouth nightly.  doxepin (SINEQUAN) 25 mg capsule Take 25 mg by mouth nightly.  metFORMIN (GLUCOPHAGE) 500 mg tablet Take 500 mg by mouth daily (with breakfast).  esomeprazole (NEXIUM) 40 mg capsule Take 40 mg by mouth every morning.  CALCIUM POLYCARBOPHIL (FIBERCON PO) Take  by mouth two (2) times a day.  CALCIUM CITRATE (CITRACAL PO) Take  by mouth two (2) times a day.  ERGOCALCIFEROL (VITAMIN D PO) Take 1,000 Units by mouth nightly.  aspirin 81 mg tablet Take  by mouth daily. Past History     Past Medical History:  Past Medical History:   Diagnosis Date    Anemia 6/18/2013    Cancer ([facility] [address]     CLL    Diabetes ([facility] [address]     Epigastric pain 6/18/2013    Family history of colon cancer 6/18/2013    sister    GERD (gastroesophageal reflux disease)     Hypertension     Ill-defined condition 2009    blood tranfusion hx       Past Surgical History:  Past Surgical History:   Procedure Laterality Date    HX CATARACT REMOVAL      bilateral    HX CHOLECYSTECTOMY      HX HERNIA REPAIR  08/09/2018    Davinci hiatal hernia- Jeff Calhoun MD    HX KNEE ARTHROSCOPY      LEFT    HX OTHER SURGICAL      portacath/removed    HX TUBAL LIGATION      HX VASCULAR ACCESS      SERGE CATH LL CHEST       Family History:  Family History   Problem Relation Age of Onset    Heart Disease Mother    Valerimike Canales Arthritis-rheumatoid Mother        Social History:  Social History     Tobacco Use    Smoking status: Never Smoker    Smokeless tobacco: Never Used   Substance Use Topics    Alcohol use: No    Drug use: Yes     Types: Prescription, OTC       Allergies:   Allergies   Allergen Reactions    Penicillins Rash     Unsure of reaction, has been 20yrs since given and cannot remember extent of allergy, but has been avoiding this drug class         Review of Systems   Review of Systems   Constitutional: Positive for appetite change, chills and fatigue. Negative for fever. Respiratory: Negative for cough and shortness of breath. Cardiovascular: Negative for chest pain. Gastrointestinal: Positive for abdominal pain and nausea. Negative for constipation, diarrhea and vomiting. Genitourinary: Negative for dysuria, frequency and hematuria. Neurological: Negative for weakness and numbness. All other systems reviewed and are negative. Physical Exam   Physical Exam  Vitals and nursing note reviewed. Constitutional:       Appearance: She is well-developed. HENT:      Head: Normocephalic and atraumatic. Nose: Nose normal.      Mouth/Throat:      Mouth: Mucous membranes are dry. Comments: Slightly dry mucous membranes  Eyes:      Extraocular Movements: Extraocular movements intact. Conjunctiva/sclera: Conjunctivae normal.   Cardiovascular:      Rate and Rhythm: Normal rate and regular rhythm. Pulmonary:      Effort: Pulmonary effort is normal. No respiratory distress. Breath sounds: Normal breath sounds. Abdominal:      General: There is no distension. Palpations: Abdomen is soft. Tenderness: There is abdominal tenderness. Musculoskeletal:         General: Normal range of motion. Cervical back: Normal range of motion and neck supple. Skin:     General: Skin is warm and dry. Neurological:      General: No focal deficit present. Mental Status: She is alert and oriented to person, place, and time. Mental status is at baseline.    Psychiatric:         Mood and Affect: Mood normal.          Diagnostic Study Results     Labs -     Recent Results (from the past 12 hour(s))   EKG, 12 LEAD, INITIAL    Collection Time: 06/18/21  3:41 PM   Result Value Ref Range    Ventricular Rate 70 BPM    Atrial Rate 70 BPM    P-R Interval 198 ms    QRS Duration 76 ms    Q-T Interval 388 ms    QTC Calculation (Bezet) 419 ms    Calculated P Axis -22 degrees    Calculated R Axis -12 degrees Calculated T Axis 53 degrees    Diagnosis       Sinus rhythm  Nonspecific ST abnormality  Confirmed by Maggie Matos ([id number]) on 6/18/2021 3:58:59 PM     CBC WITH AUTOMATED DIFF    Collection Time: 06/18/21  4:07 PM   Result Value Ref Range    WBC 7.9 3.6 - 11.0 K/uL    RBC 4.88 3.80 - 5.20 M/uL    HGB 15.3 11.5 - 16.0 g/dL    HCT 43.6 35.0 - 47.0 %    MCV 89.3 80.0 - 99.0 FL    MCH 31.4 26.0 - 34.0 PG    MCHC 35.1 30.0 - 36.5 g/dL    RDW 13.2 11.5 - 14.5 %    PLATELET 842 (L) 939 - 400 K/uL    MPV 9.8 8.9 - 12.9 FL    NRBC 0.0 0  WBC    ABSOLUTE NRBC 0.00 0.00 - 0.01 K/uL    NEUTROPHILS 74 32 - 75 %    LYMPHOCYTES 16 12 - 49 %    MONOCYTES 7 5 - 13 %    EOSINOPHILS 2 0 - 7 %    BASOPHILS 1 0 - 1 %    IMMATURE GRANULOCYTES 1 (H) 0.0 - 0.5 %    ABS. NEUTROPHILS 5.8 1.8 - 8.0 K/UL    ABS. LYMPHOCYTES 1.2 0.8 - 3.5 K/UL    ABS. MONOCYTES 0.5 0.0 - 1.0 K/UL    ABS. EOSINOPHILS 0.2 0.0 - 0.4 K/UL    ABS. BASOPHILS 0.1 0.0 - 0.1 K/UL    ABS. IMM. GRANS. 0.1 (H) 0.00 - 0.04 K/UL    DF AUTOMATED     METABOLIC PANEL, COMPREHENSIVE    Collection Time: 06/18/21  4:07 PM   Result Value Ref Range    Sodium 126 (L) 136 - 145 mmol/L    Potassium 4.6 3.5 - 5.1 mmol/L    Chloride 94 (L) 97 - 108 mmol/L    CO2 27 21 - 32 mmol/L    Anion gap 5 5 - 15 mmol/L    Glucose 116 (H) 65 - 100 mg/dL    BUN 11 6 - 20 MG/DL    Creatinine 1.24 (H) 0.55 - 1.02 MG/DL    BUN/Creatinine ratio 9 (L) 12 - 20      GFR est AA 50 (L) >60 ml/min/1.73m2    GFR est non-AA 42 (L) >60 ml/min/1.73m2    Calcium 10.2 (H) 8.5 - 10.1 MG/DL    Bilirubin, total 0.7 0.2 - 1.0 MG/DL    ALT (SGPT) 28 12 - 78 U/L    AST (SGOT) 22 15 - 37 U/L    Alk.  phosphatase 69 45 - 117 U/L    Protein, total 7.7 6.4 - 8.2 g/dL    Albumin 4.0 3.5 - 5.0 g/dL    Globulin 3.7 2.0 - 4.0 g/dL    A-G Ratio 1.1 1.1 - 2.2     TROPONIN I    Collection Time: 06/18/21  4:07 PM   Result Value Ref Range    Troponin-I, Qt. <0.05 <0.05 ng/mL   LIPASE    Collection Time: 06/18/21  4:07 PM Result Value Ref Range    Lipase 77 73 - 393 U/L   URINALYSIS W/ REFLEX CULTURE    Collection Time: 06/18/21  6:47 PM    Specimen: Urine   Result Value Ref Range    Color YELLOW/STRAW      Appearance CLEAR CLEAR      Specific gravity 1.029 1.003 - 1.030      pH (UA) 7.0 5.0 - 8.0      Protein Negative NEG mg/dL    Glucose Negative NEG mg/dL    Ketone Negative NEG mg/dL    Bilirubin Negative NEG      Blood TRACE (A) NEG      Urobilinogen 0.2 0.2 - 1.0 EU/dL    Nitrites Negative NEG      Leukocyte Esterase MODERATE (A) NEG      WBC 10-20 0 - 4 /hpf    RBC 5-10 0 - 5 /hpf    Epithelial cells FEW FEW /lpf    Bacteria Negative NEG /hpf    UA:UC IF INDICATED CULTURE NOT INDICATED BY UA RESULT CNI      Hyaline cast 0-2 0 - 5 /lpf       Radiologic Studies -   CT ABD PELV W CONT   Final Result   No evidence of acute process. XR CHEST PORT   Final Result   No acute cardiopulmonary process. CT Results  (Last 48 hours)               06/18/21 1742  CT ABD PELV W CONT Final result    Impression:  No evidence of acute process. Narrative:  EXAM: CT ABD PELV W CONT       INDICATION: left side abd pain hx of diverticulitis       COMPARISON: CT 5/7/2021        CONTRAST: 100 mL of Isovue-370. TECHNIQUE:    Following the uneventful intravenous administration of contrast, thin axial   images were obtained through the abdomen and pelvis. Coronal and sagittal   reconstructions were generated. Oral contrast was not administered. CT dose   reduction was achieved through use of a standardized protocol tailored for this   examination and automatic exposure control for dose modulation. FINDINGS:    LOWER THORAX: No significant abnormality in the incidentally imaged lower chest.   LIVER: No mass. BILIARY TREE: Status post cholecystectomy. CBD is not dilated. SPLEEN: within normal limits. PANCREAS: No mass or ductal dilatation. ADRENALS: Unremarkable. KIDNEYS: No mass, calculus, or hydronephrosis. STOMACH: Mild hiatal hernia. SMALL BOWEL: No dilatation or wall thickening. COLON: No dilatation or wall thickening. Numerous colonic diverticula are   present. No evidence of diverticulitis. APPENDIX: Not well seen. PERITONEUM: No ascites or pneumoperitoneum. RETROPERITONEUM: No lymphadenopathy or aortic aneurysm. REPRODUCTIVE ORGANS: There is a small calcified uterine fibroid. URINARY BLADDER: No mass or calculus. BONES: No destructive bone lesion. Multilevel spondylosis is seen in the spine. There is grade 1 anterolisthesis of L4 on L5. ABDOMINAL WALL: No mass or hernia. ADDITIONAL COMMENTS: N/A               CXR Results  (Last 48 hours)               06/18/21 1616  XR CHEST PORT Final result    Impression:  No acute cardiopulmonary process. Narrative:  EXAM: XR CHEST PORT       HISTORY: lethargy. COMPARISON: 5/10/2021       FINDINGS: Single view(s) of the chest. The lungs are well inflated. No focal   consolidation, pleural effusion, or pneumothorax. The cardiomediastinal   silhouette is unremarkable. The visualized osseous structures are unremarkable. Medical Decision Making   I am the first provider for this patient. I reviewed the vital signs, available nursing notes, past medical history, past surgical history, family history and social history. Vital Signs-Reviewed the patient's vital signs. Patient Vitals for the past 12 hrs:   Temp Pulse Resp BP SpO2   06/18/21 1811     98 %   06/18/21 1811     97 %   06/18/21 1809    (!) 148/65    06/18/21 1535 98 °F (36.7 °C) 68 14 (!) 142/74 100 %       Records Reviewed: Nursing Notes and Old Medical Records    Provider Notes (Medical Decision Making):   Patient presents with abdominal pain. DDx: Gastroenteritis, SBO, appendicitis, colitis, IBD, diverticulitis, mesenteric ischemia, AAA or descending dissection, ACS, ureteral stone. Will get labs and CT Abdomen.          ED Course:   Initial assessment performed. The patients presenting problems have been discussed, and they are in agreement with the care plan formulated and outlined with them. I have encouraged them to ask questions as they arise throughout their visit. Critical Care Time:   0    Disposition:  Discharge Note:  The patient has been re-evaluated and is ready for discharge. Reviewed available results with patient. Counseled patient on diagnosis and care plan. Patient has expressed understanding, and all questions have been answered. Patient agrees with plan and agrees to follow up as recommended, or to return to the ED if their symptoms worsen. Discharge instructions have been provided and explained to the patient, along with reasons to return to the ED. PLAN:  Discharge Medication List as of 6/18/2021  6:27 PM      START taking these medications    Details   ondansetron hcl (Zofran) 4 mg tablet Take 1 Tablet by mouth every eight (8) hours as needed for Nausea., Normal, Disp-20 Tablet, R-0      dicyclomine (BENTYL) 20 mg tablet Take 1 Tablet by mouth every six (6) hours. , Normal, Disp-20 Tablet, R-0         CONTINUE these medications which have NOT CHANGED    Details   levoFLOXacin (LEVAQUIN) 750 mg tablet Take 1 Tab by mouth every other day., Print, Disp-2 Tab, R-0      metroNIDAZOLE (FlagyL) 500 mg tablet Take 1 Tab by mouth three (3) times daily. , Print, Disp-14 Tab, R-0      spironolactone (ALDACTONE) 25 mg tablet Take 25 mg by mouth daily. , Historical Med      HYDROcodone-acetaminophen (NORCO) 5-325 mg per tablet Take 1 Tab by mouth every four (4) hours as needed for Pain.  Max Daily Amount: 6 Tabs., Print, Disp-40 Tab, R-0      risedronate (ACTONEL) 35 mg tablet Take 35 mg by mouth every Tuesday., Historical Med      atenolol (TENORMIN) 50 mg tablet Take 50 mg by mouth every morning., Historical Med      cyanocobalamin (VITAMIN B-12) 500 mcg tablet Take 500 mcg by mouth every morning., Historical Med      atorvastatin (LIPITOR) 10 mg tablet Take 10 mg by mouth nightly., Historical Med      fexofenadine (ALLEGRA) 180 mg tablet Take 180 mg by mouth nightly., Historical Med      doxepin (SINEQUAN) 25 mg capsule Take 25 mg by mouth nightly., Historical Med      metFORMIN (GLUCOPHAGE) 500 mg tablet Take 500 mg by mouth daily (with breakfast). Historical Med, 500 mg      esomeprazole (NEXIUM) 40 mg capsule Take 40 mg by mouth every morning., Historical Med      CALCIUM POLYCARBOPHIL (FIBERCON PO) Oral, 2 TIMES DAILY Starting 6/16/2010, Until Discontinued, Historical Med      CALCIUM CITRATE (CITRACAL PO) Oral, 2 TIMES DAILY Starting 6/16/2010, Until Discontinued, Historical Med      ERGOCALCIFEROL (VITAMIN D PO) Take 1,000 Units by mouth nightly., Historical Med      aspirin 81 mg tablet Oral, DAILY Starting 6/16/2010, Until Discontinued, Historical Med           2. Follow-up Information     Follow up With Specialties Details Why Contact Info    Chad Kong MD Internal Medicine Schedule an appointment as soon as possible for a visit  As needed [address]  [phone]          3. Return to ED if worse     Diagnosis     Clinical Impression:   1. Abdominal pain, generalized    2. Poor appetite    3. Acute cystitis without hematuria        Attestations:    Debbie Kaur M.D. Please note that this dictation was completed with [employer], the computer voice recognition software. Quite often unanticipated grammatical, syntax, homophones, and other interpretive errors are inadvertently transcribed by the computer software. Please disregard these errors. Please excuse any errors that have escaped final proofreading. Thank you.

## 2021-06-21 ENCOUNTER — PATIENT OUTREACH (OUTPATIENT)
Dept: CASE MANAGEMENT | Age: 81
End: 2021-06-21

## 2021-06-22 NOTE — PROGRESS NOTES
Ambulatory Care Management Note    Date/Time:  6/22/2021 9:56 AM    This patient was received as a referral from Daily assignment. Ambulatory Care Manager outreached to patient today to offer care management services. Introduction to self and role of care manager provided. Patient accepted care management services at this time. Follow up call scheduled at this time. Patient has Ambulatory Care Manager's contact number for for any questions or concerns. PCP scheduled for tomorrow.

## 2021-06-25 ENCOUNTER — HOSPITAL ENCOUNTER (INPATIENT)
Age: 81
LOS: 2 days | Discharge: HOME | DRG: 683 | End: 2021-06-27
Attending: STUDENT IN AN ORGANIZED HEALTH CARE EDUCATION/TRAINING PROGRAM | Admitting: INTERNAL MEDICINE
Payer: MEDICARE

## 2021-06-25 DIAGNOSIS — E87.1 HYPONATREMIA: Primary | ICD-10-CM

## 2021-06-25 LAB
ALBUMIN SERPL-MCNC: 4.2 G/DL (ref 3.5–5)
ALBUMIN/GLOB SERPL: 1 {RATIO} (ref 1.1–2.2)
ALP SERPL-CCNC: 68 U/L (ref 45–117)
ALT SERPL-CCNC: 20 U/L (ref 12–78)
ANION GAP SERPL CALC-SCNC: 8 MMOL/L (ref 5–15)
APPEARANCE UR: ABNORMAL
AST SERPL-CCNC: 18 U/L (ref 15–37)
BACTERIA URNS QL MICRO: NEGATIVE /HPF
BASOPHILS # BLD: 0.1 K/UL (ref 0–0.1)
BASOPHILS NFR BLD: 1 % (ref 0–1)
BILIRUB SERPL-MCNC: 0.8 MG/DL (ref 0.2–1)
BILIRUB UR QL: NEGATIVE
BUN SERPL-MCNC: 32 MG/DL (ref 6–20)
BUN/CREAT SERPL: 16 (ref 12–20)
CALCIUM SERPL-MCNC: 10.7 MG/DL (ref 8.5–10.1)
CHLORIDE SERPL-SCNC: 88 MMOL/L (ref 97–108)
CO2 SERPL-SCNC: 26 MMOL/L (ref 21–32)
COLOR UR: ABNORMAL
CREAT SERPL-MCNC: 2.02 MG/DL (ref 0.55–1.02)
DIFFERENTIAL METHOD BLD: ABNORMAL
EOSINOPHIL # BLD: 0.1 K/UL (ref 0–0.4)
EOSINOPHIL NFR BLD: 1 % (ref 0–7)
EPITH CASTS URNS QL MICRO: ABNORMAL /LPF
ERYTHROCYTE [DISTWIDTH] IN BLOOD BY AUTOMATED COUNT: 13.3 % (ref 11.5–14.5)
GLOBULIN SER CALC-MCNC: 4.3 G/DL (ref 2–4)
GLUCOSE SERPL-MCNC: 122 MG/DL (ref 65–100)
GLUCOSE UR STRIP.AUTO-MCNC: NEGATIVE MG/DL
GRAN CASTS URNS QL MICRO: ABNORMAL /LPF
HCT VFR BLD AUTO: 43 % (ref 35–47)
HGB BLD-MCNC: 15.6 G/DL (ref 11.5–16)
HGB UR QL STRIP: NEGATIVE
IMM GRANULOCYTES # BLD AUTO: 0.2 K/UL (ref 0–0.04)
IMM GRANULOCYTES NFR BLD AUTO: 2 % (ref 0–0.5)
KETONES UR QL STRIP.AUTO: ABNORMAL MG/DL
LEUKOCYTE ESTERASE UR QL STRIP.AUTO: ABNORMAL
LYMPHOCYTES # BLD: 3.5 K/UL (ref 0.8–3.5)
LYMPHOCYTES NFR BLD: 31 % (ref 12–49)
MCH RBC QN AUTO: 31.2 PG (ref 26–34)
MCHC RBC AUTO-ENTMCNC: 36.3 G/DL (ref 30–36.5)
MCV RBC AUTO: 86 FL (ref 80–99)
MONOCYTES # BLD: 0.7 K/UL (ref 0–1)
MONOCYTES NFR BLD: 6 % (ref 5–13)
NEUTS SEG # BLD: 6.7 K/UL (ref 1.8–8)
NEUTS SEG NFR BLD: 59 % (ref 32–75)
NITRITE UR QL STRIP.AUTO: NEGATIVE
NRBC # BLD: 0 K/UL (ref 0–0.01)
NRBC BLD-RTO: 0 PER 100 WBC
OTHER,OTHU: ABNORMAL
PH UR STRIP: 5.5 [PH] (ref 5–8)
PLATELET # BLD AUTO: 196 K/UL (ref 150–400)
PMV BLD AUTO: 10.2 FL (ref 8.9–12.9)
POTASSIUM SERPL-SCNC: 5.1 MMOL/L (ref 3.5–5.1)
PROT SERPL-MCNC: 8.5 G/DL (ref 6.4–8.2)
PROT UR STRIP-MCNC: NEGATIVE MG/DL
RBC # BLD AUTO: 5 M/UL (ref 3.8–5.2)
RBC #/AREA URNS HPF: ABNORMAL /HPF (ref 0–5)
SODIUM SERPL-SCNC: 122 MMOL/L (ref 136–145)
SODIUM UR-SCNC: 22 MMOL/L
SP GR UR REFRACTOMETRY: 1.02 (ref 1–1.03)
UROBILINOGEN UR QL STRIP.AUTO: 0.2 EU/DL (ref 0.2–1)
WBC # BLD AUTO: 11.3 K/UL (ref 3.6–11)
WBC URNS QL MICRO: ABNORMAL /HPF (ref 0–4)
YEAST URNS QL MICRO: PRESENT

## 2021-06-25 PROCEDURE — 84300 ASSAY OF URINE SODIUM: CPT

## 2021-06-25 PROCEDURE — 99285 EMERGENCY DEPT VISIT HI MDM: CPT

## 2021-06-25 PROCEDURE — 83935 ASSAY OF URINE OSMOLALITY: CPT

## 2021-06-25 PROCEDURE — 85025 COMPLETE CBC W/AUTO DIFF WBC: CPT

## 2021-06-25 PROCEDURE — 80053 COMPREHEN METABOLIC PANEL: CPT

## 2021-06-25 PROCEDURE — 65660000000 HC RM CCU STEPDOWN

## 2021-06-25 PROCEDURE — 81001 URINALYSIS AUTO W/SCOPE: CPT

## 2021-06-25 PROCEDURE — 36415 COLL VENOUS BLD VENIPUNCTURE: CPT

## 2021-06-25 RX ORDER — ACETAMINOPHEN 325 MG/1
650 TABLET ORAL
Status: DISCONTINUED | OUTPATIENT
Start: 2021-06-25 | End: 2021-06-27 | Stop reason: HOSPADM

## 2021-06-25 RX ORDER — ONDANSETRON 4 MG/1
4 TABLET, ORALLY DISINTEGRATING ORAL
Status: DISCONTINUED | OUTPATIENT
Start: 2021-06-25 | End: 2021-06-27 | Stop reason: HOSPADM

## 2021-06-25 RX ORDER — CALCIUM POLYCARBOPHIL 625 MG/1
1250 TABLET, FILM COATED ORAL DAILY
COMMUNITY

## 2021-06-25 RX ORDER — SODIUM CHLORIDE 9 MG/ML
100 INJECTION, SOLUTION INTRAVENOUS CONTINUOUS
Status: DISCONTINUED | OUTPATIENT
Start: 2021-06-26 | End: 2021-06-27 | Stop reason: HOSPADM

## 2021-06-25 RX ORDER — MELATONIN
1000
COMMUNITY

## 2021-06-25 RX ORDER — MICONAZOLE NITRATE 2 %
2 CREAM WITH APPLICATOR VAGINAL DAILY
COMMUNITY

## 2021-06-25 RX ORDER — ENOXAPARIN SODIUM 100 MG/ML
30 INJECTION SUBCUTANEOUS DAILY
Status: DISCONTINUED | OUTPATIENT
Start: 2021-06-26 | End: 2021-06-27 | Stop reason: HOSPADM

## 2021-06-25 RX ORDER — ACETAMINOPHEN 650 MG/1
650 SUPPOSITORY RECTAL
Status: DISCONTINUED | OUTPATIENT
Start: 2021-06-25 | End: 2021-06-27 | Stop reason: HOSPADM

## 2021-06-25 RX ORDER — POLYETHYLENE GLYCOL 3350 17 G/17G
17 POWDER, FOR SOLUTION ORAL DAILY PRN
Status: DISCONTINUED | OUTPATIENT
Start: 2021-06-25 | End: 2021-06-27 | Stop reason: HOSPADM

## 2021-06-25 RX ORDER — ONDANSETRON 2 MG/ML
4 INJECTION INTRAMUSCULAR; INTRAVENOUS
Status: DISCONTINUED | OUTPATIENT
Start: 2021-06-25 | End: 2021-06-27 | Stop reason: HOSPADM

## 2021-06-25 NOTE — PROGRESS NOTES
Pharmacy Medication Reconciliation     The patient was interviewed regarding current PTA medication list, use and drug allergies;  patient/patient's family present in room and obtained permission from patient to discuss drug regimen with visitor(s) present. The patient was questioned regarding use of any other inhalers, topical products, over the counter medications, herbal medications, vitamin products or ophthalmic/nasal/otic medication use. Allergy Update: Penicillins    Recommendations/Findings: The following amendments were made to the patient's active medication list on file at [facility]:   1) Additions: none  2) Deletions:   -doxepin  -esomeprazole  -levofloxacin-therapy completed  -metronidazole - therapy completed  -norco -therapy completed    3) Changes: none  Pertinent Findings: none    Clarified PTA med list with medication packages (MyPermissions), rx query, medication bottles, and patient/patient's family interview. PTA medication list was corrected to the following:     Prior to Admission Medications   Prescriptions Last Dose Informant Taking?   aspirin 81 mg tablet 6/24/2021 at Unknown time Self Yes   Sig: Take  by mouth daily. atenolol (TENORMIN) 50 mg tablet 6/24/2021 at Unknown time Self Yes   Sig: Take 50 mg by mouth every morning. atorvastatin (LIPITOR) 10 mg tablet 6/24/2021 at 2100 Self Yes   Sig: Take 10 mg by mouth nightly. calcium citrate-vitamin D3 (Citracal + D) tablet 6/24/2021 at 0900 Self Yes   Sig: Take 2 Tablets by mouth daily. calcium polycarbophiL (Fiber-Lax) 625 mg tablet 6/24/2021 at 0900 Self Yes   Sig: Take 1,250 mg by mouth daily. cephALEXin (Keflex) 500 mg capsule  Self Yes   Sig: Take 1 Capsule by mouth four (4) times daily for 7 days. cholecalciferol (Vitamin D3) (1000 Units /25 mcg) tablet 6/24/2021 at 2100 Self Yes   Sig: Take 1,000 Units by mouth nightly.    cyanocobalamin (VITAMIN B-12) 500 mcg tablet 6/24/2021 at Unknown time Self Yes   Sig: Take 500 mcg by mouth every morning. dicyclomine (BENTYL) 20 mg tablet  Self Yes   Sig: Take 1 Tablet by mouth every six (6) hours. esomeprazole (NEXIUM) 40 mg capsule 6/24/2021 at Unknown time Self Yes   Sig: Take 40 mg by mouth every morning. fexofenadine (ALLEGRA) 180 mg tablet 6/24/2021 at Unknown time Self Yes   Sig: Take 180 mg by mouth nightly. metFORMIN (GLUCOPHAGE) 500 mg tablet 6/24/2021 at 0900 Self Yes   Sig: Take 500 mg by mouth daily (with breakfast). ondansetron hcl (Zofran) 4 mg tablet  Self Yes   Sig: Take 1 Tablet by mouth every eight (8) hours as needed for Nausea. risedronate (ACTONEL) 35 mg tablet 6/22/2021 Self Yes   Sig: Take 35 mg by mouth every Tuesday. spironolactone (ALDACTONE) 25 mg tablet 6/24/2021 at Unknown time Self Yes   Sig: Take 25 mg by mouth daily.       Facility-Administered Medications: None        Thank you,  PAULETTE Kat

## 2021-06-25 NOTE — ED PROVIDER NOTES
EMERGENCY DEPARTMENT HISTORY AND PHYSICAL EXAM      Date: 6/25/2021  Patient Name: Carolee Roy    History of Presenting Illness     Chief Complaint   Patient presents with    Abnormal Lab Results     sent for low Sodium          HPI: Carolee Roy, 79 y.o. female presents to the ED with cc of low sodium. She went to her primary care doctor yesterday, blood work was done, and her primary doctor called today and said she needed to go immediately to the emergency department for low sodium. Over the past 1 week family states that she has not been her normal self, she has had no energy, seems more lethargic than usual, and she has been barely eating anything only about half a tomato per day. She was diagnosed with UTI recently, has a day or 2 left of antibiotics to take. She denies any vomiting, diarrhea, no abdominal pain. She denies any chest pain or shortness of breath, no dysuria or hematuria. No fevers. She is also taking Zofran with antibiotics, no other changes in medications recently. There are no other complaints, changes, or physical findings at this time. PCP: Elis Patten MD    No current facility-administered medications on file prior to encounter. Current Outpatient Medications on File Prior to Encounter   Medication Sig Dispense Refill    ondansetron hcl (Zofran) 4 mg tablet Take 1 Tablet by mouth every eight (8) hours as needed for Nausea. 20 Tablet 0    dicyclomine (BENTYL) 20 mg tablet Take 1 Tablet by mouth every six (6) hours. 20 Tablet 0    cephALEXin (Keflex) 500 mg capsule Take 1 Capsule by mouth four (4) times daily for 7 days. 28 Capsule 0    levoFLOXacin (LEVAQUIN) 750 mg tablet Take 1 Tab by mouth every other day. 2 Tab 0    metroNIDAZOLE (FlagyL) 500 mg tablet Take 1 Tab by mouth three (3) times daily. 14 Tab 0    spironolactone (ALDACTONE) 25 mg tablet Take 25 mg by mouth daily.       HYDROcodone-acetaminophen (NORCO) 5-325 mg per tablet Take 1 Tab by mouth every four (4) hours as needed for Pain. Max Daily Amount: 6 Tabs. 40 Tab 0    risedronate (ACTONEL) 35 mg tablet Take 35 mg by mouth every Tuesday.  atenolol (TENORMIN) 50 mg tablet Take 50 mg by mouth every morning.  cyanocobalamin (VITAMIN B-12) 500 mcg tablet Take 500 mcg by mouth every morning.  atorvastatin (LIPITOR) 10 mg tablet Take 10 mg by mouth nightly.  fexofenadine (ALLEGRA) 180 mg tablet Take 180 mg by mouth nightly.  doxepin (SINEQUAN) 25 mg capsule Take 25 mg by mouth nightly.  metFORMIN (GLUCOPHAGE) 500 mg tablet Take 500 mg by mouth daily (with breakfast).  esomeprazole (NEXIUM) 40 mg capsule Take 40 mg by mouth every morning.  CALCIUM POLYCARBOPHIL (FIBERCON PO) Take  by mouth two (2) times a day.  CALCIUM CITRATE (CITRACAL PO) Take  by mouth two (2) times a day.  ERGOCALCIFEROL (VITAMIN D PO) Take 1,000 Units by mouth nightly.  aspirin 81 mg tablet Take  by mouth daily.          Past History     Past Medical History:  Past Medical History:   Diagnosis Date    Anemia 6/18/2013    Cancer ([facility] [address]     CLL    Diabetes ([facility] [address]     Epigastric pain 6/18/2013    Family history of colon cancer 6/18/2013    sister    GERD (gastroesophageal reflux disease)     Hypertension     Ill-defined condition 2009    blood tranfusion hx       Past Surgical History:  Past Surgical History:   Procedure Laterality Date    HX CATARACT REMOVAL      bilateral    HX CHOLECYSTECTOMY      HX HERNIA REPAIR  08/09/2018    Davinci hiatal hernia- Vel Borrero MD    HX KNEE ARTHROSCOPY      LEFT    HX OTHER SURGICAL      portacath/removed    HX TUBAL LIGATION      HX VASCULAR ACCESS      SERGE CATH LL CHEST       Family History:  Family History   Problem Relation Age of Onset    Heart Disease Mother    Lexi Chery Arthritis-rheumatoid Mother        Social History:  Social History     Tobacco Use    Smoking status: Never Smoker    Smokeless tobacco: Never Used Substance Use Topics    Alcohol use: No    Drug use: Yes     Types: Prescription, OTC       Allergies: Allergies   Allergen Reactions    Penicillins Rash     Unsure of reaction, has been 20yrs since given and cannot remember extent of allergy, but has been avoiding this drug class         Review of Systems   no fever  No eye pain  No ear pain  no shortness of breath  no chest pain  no abdominal pain  no dysuria  no leg pain  No rash  No lymphadenopathy  No weight loss    Physical Exam   Physical Exam  Constitutional:       General: She is not in acute distress. HENT:      Head: Normocephalic. Eyes:      Extraocular Movements: Extraocular movements intact. Cardiovascular:      Rate and Rhythm: Normal rate and regular rhythm. Pulmonary:      Effort: Pulmonary effort is normal.      Breath sounds: Normal breath sounds. Abdominal:      Palpations: Abdomen is soft. Tenderness: There is no abdominal tenderness. Musculoskeletal:         General: No tenderness or deformity. Cervical back: Neck supple. Skin:     General: Skin is warm and dry. Neurological:      General: No focal deficit present. Mental Status: She is alert and oriented to person, place, and time.    Psychiatric:         Mood and Affect: Mood normal.         Diagnostic Study Results     Labs -     Recent Results (from the past 24 hour(s))   METABOLIC PANEL, COMPREHENSIVE    Collection Time: 06/25/21  2:39 PM   Result Value Ref Range    Sodium 122 (L) 136 - 145 mmol/L    Potassium 5.1 3.5 - 5.1 mmol/L    Chloride 88 (L) 97 - 108 mmol/L    CO2 26 21 - 32 mmol/L    Anion gap 8 5 - 15 mmol/L    Glucose 122 (H) 65 - 100 mg/dL    BUN 32 (H) 6 - 20 MG/DL    Creatinine 2.02 (H) 0.55 - 1.02 MG/DL    BUN/Creatinine ratio 16 12 - 20      GFR est AA 29 (L) >60 ml/min/1.73m2    GFR est non-AA 24 (L) >60 ml/min/1.73m2    Calcium 10.7 (H) 8.5 - 10.1 MG/DL    Bilirubin, total 0.8 0.2 - 1.0 MG/DL    ALT (SGPT) 20 12 - 78 U/L    AST (SGOT) 18 15 - 37 U/L    Alk. phosphatase 68 45 - 117 U/L    Protein, total 8.5 (H) 6.4 - 8.2 g/dL    Albumin 4.2 3.5 - 5.0 g/dL    Globulin 4.3 (H) 2.0 - 4.0 g/dL    A-G Ratio 1.0 (L) 1.1 - 2.2         Radiologic Studies -   No orders to display     CT Results  (Last 48 hours)    None        CXR Results  (Last 48 hours)    None            Medical Decision Making   I am the first provider for this patient. I reviewed the vital signs, available nursing notes, past medical history, past surgical history, family history and social history. Vital Signs-Reviewed the patient's vital signs. Patient Vitals for the past 24 hrs:   Temp Pulse Resp BP SpO2   06/25/21 1356 98 °F (36.7 °C) 74 18 111/63 100 %         Provider Notes (Medical Decision Making):   34-year-old female presenting with hyponatremia, generalized weakness and poor appetite. Concern for electrolyte/metabolic abnormalities, renal dysfunction, dehydration. ED Course:     Initial assessment performed. The patients presenting problems have been discussed, and they are in agreement with the care plan formulated and outlined with them. I have encouraged them to ask questions as they arise throughout their visit. Basic metabolic panel shows elevated BUN/creatinine of 32/2.02 consistent with KATI, sodium is low at 122, downtrending from 126 from prior, with her history I suspect that this is mostly in the setting of volume depletion. CBC shows slight leukocytosis of 11.3, negative for anemia. Urine studies will be sent, and she will be admitted to medicine. Critical Care Time:         Disposition:  Admit    PLAN:  1. Current Discharge Medication List        2.    Follow-up Information    None       Return to ED if worse     Diagnosis     Clinical Impression: Acute hyponatremia, KATI

## 2021-06-25 NOTE — ED NOTES
Pt ambulated to restroom with steady gait to provide urine specimen. Pt back on cardiac monitor x3 at this time and resting comfortably in stretcher.

## 2021-06-25 NOTE — ED NOTES
Patient is being transferred to [address], Room # 1782. Report given to Gladis Fritz RN on [facility] for routine progression of care. Report consisted of the following information SBAR, Kardex, ED Summary, MAR and Recent Results. Patient transferred to receiving unit by: transport     Outstanding consults needed: No     Next labs due: No     The following personal items will be sent with the patient during transfer to the floor: All valuables:    Cardiac monitoring ordered: Yes    The following CURRENT information was reported to the receiving RN:    Code status: Full Code at time of transfer    Last set of vital signs:  Vital Signs  Level of Consciousness: Alert (0) (06/25/21 1830)  Temp: 98 °F (36.7 °C) (06/25/21 1830)  Temp Source: Oral (06/25/21 1830)  Pulse (Heart Rate): (!) 57 (06/25/21 1830)  Heart Rate Source: Monitor (06/25/21 1830)  Cardiac Rhythm: Sinus Shay (06/25/21 1830)  Resp Rate: 17 (06/25/21 1830)  BP: (!) 109/98 (06/25/21 1830)  MAP (Monitor): 101 (06/25/21 1830)  MAP (Calculated): 102 (06/25/21 1830)  BP 1 Location: Right arm (06/25/21 1830)  BP 1 Method: Automatic (06/25/21 1830)  BP Patient Position: At rest (06/25/21 1830)  MEWS Score: 1 (06/25/21 1830)         Oxygen Therapy  O2 Sat (%): 92 % (06/25/21 1830)  Pulse via Oximetry: 56 beats per minute (06/25/21 1830)  O2 Device: None (Room air) (06/25/21 1830)      Last pain assessment:  Pain 1  Pain Scale 1: Numeric (0 - 10)  Pain Intensity 1: 0  Patient Stated Pain Goal: 0  Pain Reassessment 1: Yes      Wounds: No     Urinary catheter: voiding  Is there a cordoba order: No     LDAs:       Peripheral IV 06/25/21 Left Antecubital (Active)   Site Assessment Clean, dry, & intact 06/25/21 1606   Phlebitis Assessment 0 06/25/21 1606   Infiltration Assessment 0 06/25/21 1606   Dressing Status Clean, dry, & intact 06/25/21 1855         Opportunity for questions and clarification was provided.     Gabriela Gaona

## 2021-06-25 NOTE — ED TRIAGE NOTES
Pt reports her son called [phone]; \" I should not have let him call, I don't know why I am here. \" denied pain, fall, shortness of breath, n/v. Pt is alert to place, year, task.  Thinks its the beginning of June

## 2021-06-26 ENCOUNTER — APPOINTMENT (OUTPATIENT)
Dept: ULTRASOUND IMAGING | Age: 81
DRG: 683 | End: 2021-06-26
Attending: INTERNAL MEDICINE
Payer: MEDICARE

## 2021-06-26 LAB
ALBUMIN SERPL-MCNC: 3.5 G/DL (ref 3.5–5)
ALBUMIN/GLOB SERPL: 1.1 {RATIO} (ref 1.1–2.2)
ALP SERPL-CCNC: 54 U/L (ref 45–117)
ALT SERPL-CCNC: 18 U/L (ref 12–78)
ANION GAP SERPL CALC-SCNC: 10 MMOL/L (ref 5–15)
ANION GAP SERPL CALC-SCNC: 11 MMOL/L (ref 5–15)
ANION GAP SERPL CALC-SCNC: 9 MMOL/L (ref 5–15)
ANION GAP SERPL CALC-SCNC: 9 MMOL/L (ref 5–15)
AST SERPL-CCNC: 12 U/L (ref 15–37)
BASOPHILS # BLD: 0.1 K/UL (ref 0–0.1)
BASOPHILS NFR BLD: 1 % (ref 0–1)
BILIRUB SERPL-MCNC: 0.6 MG/DL (ref 0.2–1)
BUN SERPL-MCNC: 20 MG/DL (ref 6–20)
BUN SERPL-MCNC: 23 MG/DL (ref 6–20)
BUN SERPL-MCNC: 25 MG/DL (ref 6–20)
BUN SERPL-MCNC: 30 MG/DL (ref 6–20)
BUN/CREAT SERPL: 17 (ref 12–20)
BUN/CREAT SERPL: 17 (ref 12–20)
BUN/CREAT SERPL: 20 (ref 12–20)
BUN/CREAT SERPL: 21 (ref 12–20)
CALCIUM SERPL-MCNC: 8.2 MG/DL (ref 8.5–10.1)
CALCIUM SERPL-MCNC: 8.7 MG/DL (ref 8.5–10.1)
CALCIUM SERPL-MCNC: 9.1 MG/DL (ref 8.5–10.1)
CALCIUM SERPL-MCNC: 9.3 MG/DL (ref 8.5–10.1)
CHLORIDE SERPL-SCNC: 94 MMOL/L (ref 97–108)
CHLORIDE SERPL-SCNC: 96 MMOL/L (ref 97–108)
CHLORIDE SERPL-SCNC: 96 MMOL/L (ref 97–108)
CHLORIDE SERPL-SCNC: 98 MMOL/L (ref 97–108)
CK MB CFR SERPL CALC: NORMAL % (ref 0–2.5)
CK MB SERPL-MCNC: <1 NG/ML (ref 5–25)
CK SERPL-CCNC: 17 U/L (ref 26–192)
CO2 SERPL-SCNC: 20 MMOL/L (ref 21–32)
CO2 SERPL-SCNC: 21 MMOL/L (ref 21–32)
COMMENT, HOLDF: NORMAL
CORTIS SERPL-MCNC: 30.3 UG/DL
CREAT SERPL-MCNC: 1.16 MG/DL (ref 0.55–1.02)
CREAT SERPL-MCNC: 1.17 MG/DL (ref 0.55–1.02)
CREAT SERPL-MCNC: 1.44 MG/DL (ref 0.55–1.02)
CREAT SERPL-MCNC: 1.45 MG/DL (ref 0.55–1.02)
DIFFERENTIAL METHOD BLD: ABNORMAL
EOSINOPHIL # BLD: 0.2 K/UL (ref 0–0.4)
EOSINOPHIL NFR BLD: 2 % (ref 0–7)
ERYTHROCYTE [DISTWIDTH] IN BLOOD BY AUTOMATED COUNT: 12.9 % (ref 11.5–14.5)
EST. AVERAGE GLUCOSE BLD GHB EST-MCNC: 117 MG/DL
GLOBULIN SER CALC-MCNC: 3.3 G/DL (ref 2–4)
GLUCOSE BLD STRIP.AUTO-MCNC: 108 MG/DL (ref 65–117)
GLUCOSE BLD STRIP.AUTO-MCNC: 112 MG/DL (ref 65–117)
GLUCOSE BLD STRIP.AUTO-MCNC: 123 MG/DL (ref 65–117)
GLUCOSE BLD STRIP.AUTO-MCNC: 139 MG/DL (ref 65–117)
GLUCOSE SERPL-MCNC: 112 MG/DL (ref 65–100)
GLUCOSE SERPL-MCNC: 138 MG/DL (ref 65–100)
GLUCOSE SERPL-MCNC: 151 MG/DL (ref 65–100)
GLUCOSE SERPL-MCNC: 98 MG/DL (ref 65–100)
HBA1C MFR BLD: 5.7 % (ref 4–5.6)
HCT VFR BLD AUTO: 38.6 % (ref 35–47)
HGB BLD-MCNC: 13.7 G/DL (ref 11.5–16)
IMM GRANULOCYTES # BLD AUTO: 0.1 K/UL (ref 0–0.04)
IMM GRANULOCYTES NFR BLD AUTO: 1 % (ref 0–0.5)
LYMPHOCYTES # BLD: 3.5 K/UL (ref 0.8–3.5)
LYMPHOCYTES NFR BLD: 35 % (ref 12–49)
MCH RBC QN AUTO: 30.9 PG (ref 26–34)
MCHC RBC AUTO-ENTMCNC: 35.5 G/DL (ref 30–36.5)
MCV RBC AUTO: 86.9 FL (ref 80–99)
MONOCYTES # BLD: 0.7 K/UL (ref 0–1)
MONOCYTES NFR BLD: 7 % (ref 5–13)
NEUTS SEG # BLD: 5.4 K/UL (ref 1.8–8)
NEUTS SEG NFR BLD: 54 % (ref 32–75)
NRBC # BLD: 0 K/UL (ref 0–0.01)
NRBC BLD-RTO: 0 PER 100 WBC
OSMOLALITY UR: 192 MOSM/KG H2O
OSMOLALITY UR: 388 MOSM/KG H2O
PLATELET # BLD AUTO: 158 K/UL (ref 150–400)
PMV BLD AUTO: 10.1 FL (ref 8.9–12.9)
POTASSIUM SERPL-SCNC: 3.9 MMOL/L (ref 3.5–5.1)
POTASSIUM SERPL-SCNC: 4.2 MMOL/L (ref 3.5–5.1)
POTASSIUM SERPL-SCNC: 4.2 MMOL/L (ref 3.5–5.1)
POTASSIUM SERPL-SCNC: 4.4 MMOL/L (ref 3.5–5.1)
PROT SERPL-MCNC: 6.8 G/DL (ref 6.4–8.2)
RBC # BLD AUTO: 4.44 M/UL (ref 3.8–5.2)
SAMPLES BEING HELD,HOLD: NORMAL
SERVICE CMNT-IMP: ABNORMAL
SERVICE CMNT-IMP: ABNORMAL
SERVICE CMNT-IMP: NORMAL
SERVICE CMNT-IMP: NORMAL
SODIUM SERPL-SCNC: 124 MMOL/L (ref 136–145)
SODIUM SERPL-SCNC: 126 MMOL/L (ref 136–145)
SODIUM SERPL-SCNC: 128 MMOL/L (ref 136–145)
SODIUM SERPL-SCNC: 128 MMOL/L (ref 136–145)
SODIUM UR-SCNC: 24 MMOL/L
T3FREE SERPL-MCNC: 1.7 PG/ML (ref 2.2–4)
T4 FREE SERPL-MCNC: 1.1 NG/DL (ref 0.8–1.5)
TROPONIN I SERPL-MCNC: <0.05 NG/ML
TSH SERPL DL<=0.05 MIU/L-ACNC: 4.42 UIU/ML (ref 0.36–3.74)
WBC # BLD AUTO: 9.9 K/UL (ref 3.6–11)

## 2021-06-26 PROCEDURE — 65660000000 HC RM CCU STEPDOWN

## 2021-06-26 PROCEDURE — 84443 ASSAY THYROID STIM HORMONE: CPT

## 2021-06-26 PROCEDURE — 97530 THERAPEUTIC ACTIVITIES: CPT | Performed by: PHYSICAL THERAPIST

## 2021-06-26 PROCEDURE — 82550 ASSAY OF CK (CPK): CPT

## 2021-06-26 PROCEDURE — 85025 COMPLETE CBC W/AUTO DIFF WBC: CPT

## 2021-06-26 PROCEDURE — 82553 CREATINE MB FRACTION: CPT

## 2021-06-26 PROCEDURE — 82962 GLUCOSE BLOOD TEST: CPT

## 2021-06-26 PROCEDURE — 80048 BASIC METABOLIC PNL TOTAL CA: CPT

## 2021-06-26 PROCEDURE — 74011250636 HC RX REV CODE- 250/636: Performed by: INTERNAL MEDICINE

## 2021-06-26 PROCEDURE — 82533 TOTAL CORTISOL: CPT

## 2021-06-26 PROCEDURE — 83036 HEMOGLOBIN GLYCOSYLATED A1C: CPT

## 2021-06-26 PROCEDURE — 84300 ASSAY OF URINE SODIUM: CPT

## 2021-06-26 PROCEDURE — 83935 ASSAY OF URINE OSMOLALITY: CPT

## 2021-06-26 PROCEDURE — 87086 URINE CULTURE/COLONY COUNT: CPT

## 2021-06-26 PROCEDURE — 84481 FREE ASSAY (FT-3): CPT

## 2021-06-26 PROCEDURE — 36415 COLL VENOUS BLD VENIPUNCTURE: CPT

## 2021-06-26 PROCEDURE — 76770 US EXAM ABDO BACK WALL COMP: CPT

## 2021-06-26 PROCEDURE — 84484 ASSAY OF TROPONIN QUANT: CPT

## 2021-06-26 PROCEDURE — 84439 ASSAY OF FREE THYROXINE: CPT

## 2021-06-26 PROCEDURE — 97161 PT EVAL LOW COMPLEX 20 MIN: CPT | Performed by: PHYSICAL THERAPIST

## 2021-06-26 PROCEDURE — 80053 COMPREHEN METABOLIC PANEL: CPT

## 2021-06-26 PROCEDURE — 97116 GAIT TRAINING THERAPY: CPT | Performed by: PHYSICAL THERAPIST

## 2021-06-26 PROCEDURE — 74011250637 HC RX REV CODE- 250/637: Performed by: INTERNAL MEDICINE

## 2021-06-26 RX ORDER — ATENOLOL 25 MG/1
25 TABLET ORAL
Status: DISCONTINUED | OUTPATIENT
Start: 2021-06-26 | End: 2021-06-26

## 2021-06-26 RX ORDER — GUAIFENESIN 100 MG/5ML
81 LIQUID (ML) ORAL DAILY
Status: DISCONTINUED | OUTPATIENT
Start: 2021-06-26 | End: 2021-06-27 | Stop reason: HOSPADM

## 2021-06-26 RX ORDER — ATENOLOL 25 MG/1
12.5 TABLET ORAL
Status: DISCONTINUED | OUTPATIENT
Start: 2021-06-27 | End: 2021-06-27 | Stop reason: HOSPADM

## 2021-06-26 RX ORDER — MAGNESIUM SULFATE 100 %
4 CRYSTALS MISCELLANEOUS AS NEEDED
Status: DISCONTINUED | OUTPATIENT
Start: 2021-06-26 | End: 2021-06-27 | Stop reason: HOSPADM

## 2021-06-26 RX ORDER — ATORVASTATIN CALCIUM 10 MG/1
10 TABLET, FILM COATED ORAL
Status: DISCONTINUED | OUTPATIENT
Start: 2021-06-26 | End: 2021-06-27 | Stop reason: HOSPADM

## 2021-06-26 RX ORDER — PANTOPRAZOLE SODIUM 40 MG/1
40 TABLET, DELAYED RELEASE ORAL
Status: DISCONTINUED | OUTPATIENT
Start: 2021-06-26 | End: 2021-06-27 | Stop reason: HOSPADM

## 2021-06-26 RX ORDER — DEXTROSE 50 % IN WATER (D50W) INTRAVENOUS SYRINGE
25-50 AS NEEDED
Status: DISCONTINUED | OUTPATIENT
Start: 2021-06-26 | End: 2021-06-27 | Stop reason: HOSPADM

## 2021-06-26 RX ORDER — INSULIN LISPRO 100 [IU]/ML
INJECTION, SOLUTION INTRAVENOUS; SUBCUTANEOUS
Status: DISCONTINUED | OUTPATIENT
Start: 2021-06-26 | End: 2021-06-27 | Stop reason: HOSPADM

## 2021-06-26 RX ORDER — HYDRALAZINE HYDROCHLORIDE 20 MG/ML
20 INJECTION INTRAMUSCULAR; INTRAVENOUS
Status: DISCONTINUED | OUTPATIENT
Start: 2021-06-26 | End: 2021-06-27 | Stop reason: HOSPADM

## 2021-06-26 RX ADMIN — ATORVASTATIN CALCIUM 10 MG: 10 TABLET, FILM COATED ORAL at 21:18

## 2021-06-26 RX ADMIN — ATORVASTATIN CALCIUM 10 MG: 10 TABLET, FILM COATED ORAL at 00:31

## 2021-06-26 RX ADMIN — SODIUM CHLORIDE 75 ML/HR: 9 INJECTION, SOLUTION INTRAVENOUS at 16:03

## 2021-06-26 RX ADMIN — ENOXAPARIN SODIUM 30 MG: 30 INJECTION SUBCUTANEOUS at 09:43

## 2021-06-26 RX ADMIN — ASPIRIN 81 MG CHEWABLE TABLET 81 MG: 81 TABLET CHEWABLE at 09:42

## 2021-06-26 RX ADMIN — PANTOPRAZOLE SODIUM 40 MG: 40 TABLET, DELAYED RELEASE ORAL at 09:42

## 2021-06-26 RX ADMIN — SODIUM CHLORIDE 75 ML/HR: 9 INJECTION, SOLUTION INTRAVENOUS at 00:31

## 2021-06-26 NOTE — H&P
Hospitalist Admission Note    NAME:  Juana Kim   :   1940   MRN:   [id number]     Date of admit: 2021    PCP: Spencer Estrella MD    Assessment/Plan:     Hyponatremia sodium 122 POA chronic sodium 126 on 2021  Acute kidney injury POA admit BUN/creatinine 32 and 2.02  Stage III chronic kidney disease baseline creatinine 1.25 POA  Presented with nonspecific symptoms for about a week   Poor appetite, lethargy, generalized weakness   No headache, nausea vomiting, diarrhea   Currently alert and oriented x3  Similar sodium 124, creatinine 1.75 during admit for diverticulitis in May 2021   Improved with IV fluids  Recently treated for UTI with Keflex on 2021   Did receive IV contrast 2021 with CT scan in ED  Admit  No treatment in ED, I will start gentle IV fluids overnight   Really looks like she is hypovolemic  Blood pressure borderline low.         will taper down blood pressure medicines and hold spironolactone  Renal ultrasound to rule out hydronephrosis   CT scan with baseline kidney function 1 week ago did not have hydro  Check urine osmolarity and sodium  Check cortisol and TSH  Recheck sodium in a.m. then every 6 hours x 2  PT consult  Patient anxious for discharge tomorrow, discussed more likely would be     Essential hypertension POA  Hyperlipidemia POA  BP borderline low, heart rate in low 50s  Reduce atenolol to 25 mg/day from 50 mg  Hold losartan 100 mg a day with acute kidney injury  Hold spironolactone  Continue aspirin  As needed hydralazine    Recent UTI POA seen in ED 2021  Prescribed Keflex, taking up until this time  Still with some pyuria, recheck urine culture   No urine culture was sent during ED visit  Hold antibiotics till culture back    Diabetes mellitus type II POA  Currently on Metformin 500 mg daily  Hold with reduced GFR  Sliding scale insulin  Check hemoglobin A1c    History of chronic lymphocytic leukemia    Body mass index is 24.87 kg/m². Given the patient's current clinical presentation, I have a high level of concern for decompensation if discharged from the emergency department. My assessment of this patient's clinical condition and my plan of care is as noted above. DVT prophylaxis with heparin SQ    Code status: Full code  NOK: children    History     CHIEF COMPLAINT: \"I was having some pains in my stomach, my appetite is been poor and have been feeling lethargic for the past week\", noted to have acute kidney injury and sodium 122 in ED    HISTORY OF PRESENT ILLNESS:    26-year-old female    Recently admitted 5/7 to 5/10/2021 with sigmoid diverticulitis  Treated with antibiotics with resolution  Acute kidney injury with a creatinine of 1.75 improved to 1.25 at discharge  Sodium was 124, improved to 135 with IV fluids    Recently seen in the emergency room 6/18/2021  Fatigue, poor appetite, generalized abdominal pain  CT scan abdomen pelvis with IV contrast    Postcholecystectomy, normal common bile duct   Numerous diverticuli without diverticulitis   No hydronephrosis   Overall no acute process  Sodium was 126  Creatinine was 1.24  UA had 10-20 WBC, negative bacteria, no culture was sent  Treated with p.o.  Keflex  Discharged home    No real improvement since ER visit  Not really eating very much per family  Very poor energy and lethargic  Chronic shortness of breath but no change  No dysuria  No chest pain, cough, fevers chills  No abdominal pain, nausea vomiting, diarrhea    Emergency room  Blood pressure borderline low 102/65, heart rate in the low 50s  Sodium 122  BUN and creatinine 32 and 2.02  No fluids in ED  We were called to admit the patient      Past Medical History:   Diagnosis Date    Anemia 6/18/2013    Cancer ([facility] [address]     CLL    Diabetes ([facility] [address]     Epigastric pain 6/18/2013    Family history of colon cancer 6/18/2013    sister    GERD (gastroesophageal reflux disease)     Hypertension     Ill-defined condition 2009    blood tranfusion hx        Past Surgical History:   Procedure Laterality Date    HX CATARACT REMOVAL      bilateral    HX CHOLECYSTECTOMY      HX HERNIA REPAIR  08/09/2018    Paulinci hiatal hernia- Lyla Cooper MD    HX KNEE ARTHROSCOPY      LEFT    HX OTHER SURGICAL      portacath/removed    HX TUBAL LIGATION      HX VASCULAR ACCESS      SERGE CATH LL CHEST       Social History     Tobacco Use    Smoking status: Never Smoker    Smokeless tobacco: Never Used   Substance Use Topics    Alcohol use: No   Lives with granddaughter    Family History   Problem Relation Age of Onset    Heart Disease Mother     Arthritis-rheumatoid Mother         Allergies   Allergen Reactions    Penicillins Rash     Unsure of reaction, has been 20yrs since given and cannot remember extent of allergy, but has been avoiding this drug class        Prior to Admission medications    Medication Sig Start Date End Date Taking? Authorizing Provider   calcium citrate-vitamin D3 (Citracal + D) tablet Take 2 Tablets by mouth daily. Yes Provider, Historical   calcium polycarbophiL (Fiber-Lax) 625 mg tablet Take 1,250 mg by mouth daily. Yes Provider, Historical   cholecalciferol (Vitamin D3) (1000 Units /25 mcg) tablet Take 1,000 Units by mouth nightly. Yes Provider, Historical   ondansetron hcl (Zofran) 4 mg tablet Take 1 Tablet by mouth every eight (8) hours as needed for Nausea. 6/18/21  Yes Kenneth Mac MD   dicyclomine (BENTYL) 20 mg tablet Take 1 Tablet by mouth every six (6) hours. 6/18/21  Yes Kenneth Mac MD   cephALEXin (Keflex) 500 mg capsule Take 1 Capsule by mouth four (4) times daily for 7 days. 6/18/21 6/25/21 Yes Kenneth Mac MD   spironolactone (ALDACTONE) 25 mg tablet Take 25 mg by mouth daily. Yes Other, MD Navid   risedronate (ACTONEL) 35 mg tablet Take 35 mg by mouth every Tuesday. Yes Provider, Historical   atenolol (TENORMIN) 50 mg tablet Take 50 mg by mouth every morning.    Yes Provider, Historical   cyanocobalamin (VITAMIN B-12) 500 mcg tablet Take 500 mcg by mouth every morning. Yes Provider, Historical   atorvastatin (LIPITOR) 10 mg tablet Take 10 mg by mouth nightly. Yes Provider, Historical   fexofenadine (ALLEGRA) 180 mg tablet Take 180 mg by mouth nightly. Yes Provider, Historical   metFORMIN (GLUCOPHAGE) 500 mg tablet Take 500 mg by mouth daily (with breakfast). Yes Provider, Historical   esomeprazole (NEXIUM) 40 mg capsule Take 40 mg by mouth every morning. 6/16/10  Yes Provider, Historical   aspirin 81 mg tablet Take  by mouth daily.  6/16/10  Yes Provider, Historical       Review of symptoms:     POSITIVE= Bold  Negative = not bold  General:  fever, chills, sweats, generalized weakness, weight loss     loss of appetite   Eyes:    blurred vision, eye pain, double vision  ENT:    Coryza, sore throat, trouble swallowing  Respiratory:   cough, sputum, chronic SOB  Cardiology:   chest pain, orthopnea, PND, edema  Gastrointestinal:  abdominal pain , N/V, diarrhea, constipation, melena or BRBPR  Genitourinary:  Urgency, dysuria, hematuria  Muskuloskeletal :  Joint redness, swelling or acute joint pain, myalgias  Hematology:  easy bruising, nose or gum bleeding  Dermatological: rash, ulceration  Endocrine:   Polyuria or polydipsia, heat or hold intolerance  Neurological:  Headache, focal motor or sensory changes     Speech difficulties, memory loss  Psychological: depression, agitation      Objective:   VITALS:    Patient Vitals for the past 24 hrs:   Temp Pulse Resp BP SpO2   06/25/21 1859 97.5 °F (36.4 °C) (!) 54 16 123/62 97 %   06/25/21 1830 98 °F (36.7 °C) (!) 57 17 (!) 109/98 92 %   06/25/21 1815  (!) 56 15 100/84 96 %   06/25/21 1734     100 %   06/25/21 1732  (!) 54 12 131/75    06/25/21 1715  (!) 56 9 (!) 110/55 100 %   06/25/21 1700  (!) 58 11 120/63 93 %   06/25/21 1630  (!) 58 12 115/64 99 %   06/25/21 1615  62 12 102/65 100 %   06/25/21 1356 98 °F (36.7 °C) 74 18 111/63 100 %     Temp (24hrs), Av.8 °F (36.6 °C), Min:97.5 °F (36.4 °C), Max:98 °F (36.7 °C)      O2 Device: None (Room air)    Wt Readings from Last 12 Encounters:   21 61.7 kg (136 lb)   21 63 kg (138 lb 14.2 oz)   21 64.9 kg (143 lb 1.3 oz)   10/09/18 62.3 kg (137 lb 6.4 oz)   18 61.4 kg (135 lb 6.4 oz)   18 62.3 kg (137 lb 5.6 oz)   18 63 kg (138 lb 14.2 oz)   18 62.1 kg (137 lb)   18 62.1 kg (137 lb)   18 62.8 kg (138 lb 7.2 oz)   17 65.1 kg (143 lb 9.6 oz)   13 70.8 kg (156 lb)         PHYSICAL EXAM:     I had a face to face encounter and independently examined this patient on 21  as outlined below:    General:    Alert, cooperative in no distress     HEENT: Normocephalic, atraumatic    PERRL, Sclera no icterus    Nasal mucosa without masses or discharge  Hearing intact to voice    Oropharynx without erythema or exudate  No thrush  Pink MM  Neck:  No meningismus, trachea midline, no carotid bruits     Thyroid not enlarged, no nodules or tenderness  Lungs:   Clear to auscultation bilaterally. No wheezing or rales    No accessory muscle use or retractions. Heart:   Regular rate and rhythm,  no murmur or gallop. No LE edema  Abdomen:   Soft, non-tender. Not distended. Bowel sounds normal.     No masses, No Hepatosplenomegaly, No Rebound or guarding  Lymph nodes: No cervical or inguinal COURTNEY  Musculoskeletal:  No Joint swelling, erythema, warmth.  No Cyanosis or clubbing  Skin:      No rashes    Not Jaundiced   No nodules or thickening  Neurologic: Alert and oriented to birthday, current month and year, current place, current president    Cranial nerves 2 to 12 intact    Symmetric motor strength bilaterally       LAB DATA REVIEWED:    Recent Results (from the past 12 hour(s))   METABOLIC PANEL, COMPREHENSIVE    Collection Time: 21  2:39 PM   Result Value Ref Range    Sodium 122 (L) 136 - 145 mmol/L    Potassium 5.1 3.5 - 5.1 mmol/L    Chloride 88 (L) 97 - 108 mmol/L    CO2 26 21 - 32 mmol/L    Anion gap 8 5 - 15 mmol/L    Glucose 122 (H) 65 - 100 mg/dL    BUN 32 (H) 6 - 20 MG/DL    Creatinine 2.02 (H) 0.55 - 1.02 MG/DL    BUN/Creatinine ratio 16 12 - 20      GFR est AA 29 (L) >60 ml/min/1.73m2    GFR est non-AA 24 (L) >60 ml/min/1.73m2    Calcium 10.7 (H) 8.5 - 10.1 MG/DL    Bilirubin, total 0.8 0.2 - 1.0 MG/DL    ALT (SGPT) 20 12 - 78 U/L    AST (SGOT) 18 15 - 37 U/L    Alk. phosphatase 68 45 - 117 U/L    Protein, total 8.5 (H) 6.4 - 8.2 g/dL    Albumin 4.2 3.5 - 5.0 g/dL    Globulin 4.3 (H) 2.0 - 4.0 g/dL    A-G Ratio 1.0 (L) 1.1 - 2.2     CBC WITH AUTOMATED DIFF    Collection Time: 06/25/21  4:03 PM   Result Value Ref Range    WBC 11.3 (H) 3.6 - 11.0 K/uL    RBC 5.00 3.80 - 5.20 M/uL    HGB 15.6 11.5 - 16.0 g/dL    HCT 43.0 35.0 - 47.0 %    MCV 86.0 80.0 - 99.0 FL    MCH 31.2 26.0 - 34.0 PG    MCHC 36.3 30.0 - 36.5 g/dL    RDW 13.3 11.5 - 14.5 %    PLATELET 946 379 - 201 K/uL    MPV 10.2 8.9 - 12.9 FL    NRBC 0.0 0  WBC    ABSOLUTE NRBC 0.00 0.00 - 0.01 K/uL    NEUTROPHILS 59 32 - 75 %    LYMPHOCYTES 31 12 - 49 %    MONOCYTES 6 5 - 13 %    EOSINOPHILS 1 0 - 7 %    BASOPHILS 1 0 - 1 %    IMMATURE GRANULOCYTES 2 (H) 0.0 - 0.5 %    ABS. NEUTROPHILS 6.7 1.8 - 8.0 K/UL    ABS. LYMPHOCYTES 3.5 0.8 - 3.5 K/UL    ABS. MONOCYTES 0.7 0.0 - 1.0 K/UL    ABS. EOSINOPHILS 0.1 0.0 - 0.4 K/UL    ABS. BASOPHILS 0.1 0.0 - 0.1 K/UL    ABS. IMM.  GRANS. 0.2 (H) 0.00 - 0.04 K/UL    DF AUTOMATED     URINALYSIS W/ RFLX MICROSCOPIC    Collection Time: 06/25/21  5:30 PM   Result Value Ref Range    Color YELLOW/STRAW      Appearance CLOUDY (A) CLEAR      Specific gravity 1.016 1.003 - 1.030      pH (UA) 5.5 5.0 - 8.0      Protein Negative NEG mg/dL    Glucose Negative NEG mg/dL    Ketone TRACE (A) NEG mg/dL    Bilirubin Negative NEG      Blood Negative NEG      Urobilinogen 0.2 0.2 - 1.0 EU/dL    Nitrites Negative NEG      Leukocyte Esterase LARGE (A) NEG      WBC 20-50 0 - 4 /hpf    RBC 0-5 0 - 5 /hpf    Epithelial cells MODERATE (A) FEW /lpf    Bacteria Negative NEG /hpf    Granular cast 0-2 (A) NEG /lpf    Yeast PRESENT (A) NEG      Other: Renal Epithelial cells Present           I saw the patient personally, took a history and did a complete physical exam at the bedside. I performed complex decision making in coming up with a diagnostic and treatment plan for the patient. I reviewed the patient's past medical records, current laboratory and radiology results, and actual Xray films/EKG. I have also discussed this case with the involved ED physician.     Care Plan discussed with:    Patient, ED Doc    Risk of deterioration:  High    Total Time Coordinating Admission: 65    minutes    Total Critical Care Time:         Antony Ford MD

## 2021-06-26 NOTE — PROGRESS NOTES
End of Shift Note    Bedside shift change report given to Gwendolyn Riley (oncoming nurse) by Neelima Meredith (offgoing nurse). Report included the following information SBAR, Kardex, Intake/Output, MAR, Accordion and Recent Results    Shift worked:  7-7p     Shift summary and any significant changes:     Na+: 128. Q6hr BMPs. Worked with PT. Education on dehydration and hyponatremia printed and given to pt and family.       Concerns for physician to address:  none     [employer] phone for oncoming shift:   [phone]

## 2021-06-26 NOTE — PROGRESS NOTES
End of Shift Note    Bedside shift change report given to Alejandrina (oncoming nurse) by Radha Torres (offgoing nurse). Report included the following information SBAR, Kardex, Intake/Output, MAR and Recent Results    Shift worked:  6929-5114     Shift summary and any significant changes:     No significant changes, hyponatremia beginning to trend up but level still low per AM labs. Family present at bedside until close to midnight and were asking questions about pt's plan of care and what should be done for her. AM atenolol held per consult with provider due to low pulse/BP. Concerns for physician to address:  none     Zone phone for oncoming shift:          Activity:  Activity Level: Up with Assistance  Number times ambulated in hallways past shift: 0  Number of times OOB to chair past shift: 0    Cardiac:   Cardiac Monitoring: Yes      Cardiac Rhythm: Sinus Shay    Access:   Current line(s): PIV     Genitourinary:   Urinary status: voiding    Respiratory:   O2 Device: None (Room air)  Chronic home O2 use?: NO  Incentive spirometer at bedside: NO     GI:     Current diet:  ADULT DIET Regular  Passing flatus: YES  Tolerating current diet: YES       Pain Management:   Patient states pain is manageable on current regimen: N/A    Skin:  Sergio Score: 18  Interventions: increase time out of bed    Patient Safety:  Fall Score:  Total Score: 2  Interventions: gripper socks       Length of Stay:  Expected LOS: - - -  Actual LOS: 1      Radha Torres

## 2021-06-26 NOTE — PROGRESS NOTES
PHYSICAL THERAPY EVALUATION/DISCHARGE  Patient: Jenny Ortiz (68 y.o. female)  Date: 6/26/2021  Primary Diagnosis: Hyponatremia [E87.1]       Precautions:          ASSESSMENT  Based on the objective data described below, the patient presents with-baseline level of strength, balance, and functional mobility. Family present states that patient's ambulation is the same as usual, except perhaps a bit slower; she is able to ambulate 100 feet in hallway with gait belt and slow pace, supervision for safety. Patient states that she does not want HHPT, and she will likely gain strength upon return to her home and resuming her usual activity. Educated patient/family with safe stairs technique and how to guard patient with ascending/descending stairs, and they verbalize understanding. Educated family on how to obtain HHPT services in event that patient does need some strengthening after returning home, and they voice understanding. Patient is hoping for discharge tomorrow, \"If I'm here until Monday, I'll go crazy\". Functional Outcome Measure: The patient scored 16/20 on the Elder Mobility Scale outcome measure which is indicative of independence with functional mobility/ADLs. .      Other factors to consider for discharge: adult granddaughter lives with patient, patient has 7 children/multiple grandchildren who can assist as needed     Further skilled acute physical therapy is not indicated at this time. PLAN :  Recommendation for discharge: (in order for the patient to meet his/her long term goals)  No skilled physical therapy/ follow up rehabilitation needs identified at this time (educated family to call patient's PCP for Home Health PT order, if needed). This discharge recommendation:  Has not yet been discussed the attending provider and/or case management    IF patient discharges home will need the following DME: none       SUBJECTIVE:   Patient stated I'll be happy sitting here.  Patient able to sit on low sofa in room and stand from it without difficulty. OBJECTIVE DATA SUMMARY:   HISTORY:    Past Medical History:   Diagnosis Date    Anemia 6/18/2013    Cancer ([facility]     CLL    Diabetes ([facility]     Epigastric pain 6/18/2013    Family history of colon cancer 6/18/2013    sister    GERD (gastroesophageal reflux disease)     Hypertension     Ill-defined condition 2009    blood tranfusion hx     Past Surgical History:   Procedure Laterality Date    HX CATARACT REMOVAL      bilateral    HX CHOLECYSTECTOMY      HX HERNIA REPAIR  08/09/2018    Davinci hiatal hernia- Fallon Cr MD    HX KNEE ARTHROSCOPY      LEFT    HX OTHER SURGICAL      portacath/removed    HX TUBAL LIGATION      HX VASCULAR ACCESS      SERGE CATH LL CHEST       Prior level of function: independent, does not drive  Personal factors and/or comorbidities impacting plan of care: none    Home Situation  Home Environment: Private residence  # Steps to Enter: 4  Rails to Enter: Yes (son is making rails today)  Hand Rails : Bilateral  One/Two Story Residence: One story  Living Alone: No  Support Systems: Family member(s)  Patient Expects to be Discharged to[de-identified] House  Current DME Used/Available at Home: Fleurette Amble, rollator  Tub or Shower Type: Shower    EXAMINATION/PRESENTATION/DECISION MAKING:   Critical Behavior:  Neurologic State: Alert  Orientation Level: Appropriate for age, Oriented X4  Cognition: Appropriate decision making, Appropriate for age attention/concentration, Follows commands  Safety/Judgement: Fall prevention, Awareness of environment, Home safety  Hearing:   Auditory  Auditory Impairment: None  Skin:  IV left UE, + telemetry  Edema: none noted  Range Of Motion:  AROM: Within functional limits                       Strength:    Strength: Generally decreased, functional                    Tone & Sensation:   Tone: Normal              Sensation: Intact               Coordination:  Coordination: Within functional limits  Vision: Functional Mobility:  Bed Mobility:  Rolling: Independent  Supine to Sit: Independent  Sit to Supine: Independent     Transfers:  Sit to Stand: Independent  Stand to Sit: Independent        Bed to Chair: Supervision (for safety with IV line)              Balance:   Sitting: Intact  Standing: Intact  Ambulation/Gait Training:  Distance (ft): 100 Feet (ft)  Assistive Device: Gait belt  Ambulation - Level of Assistance: Supervision (for safety in [facility])     Gait Description (WDL): Exceptions to WDL  Gait Abnormalities: Decreased step clearance  Right Side Weight Bearing: Full  Left Side Weight Bearing: Full  Base of Support: Narrowed     Speed/Galilea: Pace decreased (<100 feet/min)  Step Length: Right shortened;Left shortened        Stairs:  Number of Stairs Trained: 0 (reviewed safe technique/guarding patient verbally with patient/family)           Therapeutic Exercises:   AROM from sitting at edge of bed prior to mobility training    Functional Measure:    Elder Mobility Scale    16/20         Scores under 10  generally these patients are dependent in mobility maneuvers; require help with  basic ADL, such as transfers, toileting and dressing. Scores between 10  13  generally these patients are borderline in terms of safe mobility and  independence in ADL i.e. they require some help with some mobility maneuvers. Scores over 14  Generally these patients are able to perform mobility maneuvers alone and safely  and are independent in basic ADL.               Physical Therapy Evaluation Charge Determination   History Examination Presentation Decision-Making   LOW Complexity : Zero comorbidities / personal factors that will impact the outcome / POC LOW Complexity : 1-2 Standardized tests and measures addressing body structure, function, activity limitation and / or participation in recreation  LOW Complexity : Stable, uncomplicated  LOW Complexity : FOTO score of       Based on the above components, the patient evaluation is determined to be of the following complexity level: LOW     Pain Rating:  Patient does not report pain    Activity Tolerance:   Good      After treatment patient left in no apparent distress:   Sitting in chair and Caregiver / family present    COMMUNICATION/EDUCATION:   The patients plan of care was discussed with: Registered nurse. Fall prevention education was provided and the patient/caregiver indicated understanding. and Patient/family have participated as able in goal setting and plan of care.     Thank you for this referral.  Mila Yepez, PT   Time Calculation: 33 mins

## 2021-06-26 NOTE — PROGRESS NOTES
Hospitalist Progress Note    NAME: Slim Howell   :  1940   MRN:  [id number]       Assessment / Plan:  Hyponatremia sodium 122 POA chronic sodium 126 on 2021  Acute kidney injury POA admit BUN/creatinine 32 and 2.02  Stage III chronic kidney disease baseline creatinine 1.25 POA  Presented with nonspecific symptoms for about a week              Poor appetite, lethargy, generalized weakness              No headache, nausea vomiting, diarrhea              Currently alert and oriented x3  Similar sodium 124, creatinine 1.75 during admit for diverticulitis in May 2021              Improved with IV fluids  Recently treated for UTI with Keflex on 2021              Did receive IV contrast 2021 with CT scan in ED  Renal US showed no hydronephrosis   Creat improving , NA improving   BMP q6h      Essential hypertension POA  Hyperlipidemia POA  BP borderline low, heart rate in low 50s  Reduce atenolol further to 12.5mg   Hold losartan 100 mg a day with acute kidney injury  Hold spironolactone  Continue aspirin  As needed hydralazine     Recent UTI POA seen in ED 2021  Prescribed Keflex, taking up until this time  Still with some pyuria, recheck urine culture              No urine culture was sent during ED visit  Hold antibiotics till culture back     Diabetes mellitus type II POA  Currently on Metformin 500 mg daily  Hold with reduced GFR  Sliding scale insulin   hemoglobin A1c 5.7     History of chronic lymphocytic leukemia      18.5 - 24.9 Normal weight / Body mass index is 24.87 kg/m². Estimated discharge date:   Barriers: sodium     Updated 2 daughters at bedtime , answered all questions   Code status: Full  Prophylaxis: Lovenox  Recommended Disposition: Home w/Family     Subjective:     Chief Complaint / Reason for Physician Visit  FU hyponatremia . Feels better, was able to eat    Discussed with RN events overnight.      Review of Systems:  Symptom Y/N Comments  Symptom Y/N Comments   Fever/Chills    Chest Pain n    Poor Appetite y   Edema     Cough    Abdominal Pain n    Sputum    Joint Pain     SOB/AMADO    Pruritis/Rash     Nausea/vomit n   Tolerating PT/OT     Diarrhea    Tolerating Diet y    Constipation    Other       Could NOT obtain due to:      Objective:     VITALS:   Last 24hrs VS reviewed since prior progress note. Most recent are:  Patient Vitals for the past 24 hrs:   Temp Pulse Resp BP SpO2   06/26/21 0729 97.7 °F (36.5 °C) 60 17 (!) 122/59 98 %   06/26/21 0619  (!) 59  109/65    06/26/21 0300 97.8 °F (36.6 °C) (!) 58 16 113/67 99 %   06/25/21 2252 97.5 °F (36.4 °C) (!) 54 17 (!) 100/54 98 %   06/25/21 1859 97.5 °F (36.4 °C) (!) 54 16 123/62 97 %   06/25/21 1830 98 °F (36.7 °C) (!) 57 17 (!) 109/98 92 %   06/25/21 1815  (!) 56 15 100/84 96 %   06/25/21 1734     100 %   06/25/21 1732  (!) 54 12 131/75    06/25/21 1715  (!) 56 9 (!) 110/55 100 %   06/25/21 1700  (!) 58 11 120/63 93 %   06/25/21 1630  (!) 58 12 115/64 99 %   06/25/21 1615  62 12 102/65 100 %   06/25/21 1356 98 °F (36.7 °C) 74 18 111/63 100 %     No intake or output data in the 24 hours ending 06/26/21 0831     I had a face to face encounter and independently examined this patient on 6/26/2021, as outlined below:  PHYSICAL EXAM:  General: WD, WN. Alert, cooperative, no acute distress    EENT:  EOMI. Anicteric sclerae. MMM  Resp:  CTA bilaterally, no wheezing or rales. No accessory muscle use  CV:  Regular  rhythm,  No edema  GI:  Soft, Non distended, Non tender. +Bowel sounds  Neurologic:  Alert and oriented X 3, normal speech,   Psych:   Good insight. Not anxious nor agitated  Skin:  No rashes.   No jaundice    Reviewed most current lab test results and cultures  YES  Reviewed most current radiology test results   YES  Review and summation of old records today    NO  Reviewed patient's current orders and MAR    YES  PMH/SH reviewed - no change compared to H&P  ________________________________________________________________________  Care Plan discussed with:    Comments   Patient x    Family      RN x    Care Manager     Consultant                        Multidiciplinary team rounds were held today with , nursing, pharmacist and clinical coordinator. Patient's plan of care was discussed; medications were reviewed and discharge planning was addressed. ________________________________________________________________________  Total NON critical care TIME: 35 Minutes    Total CRITICAL CARE TIME Spent:   Minutes non procedure based      Comments   >50% of visit spent in counseling and coordination of care     ________________________________________________________________________  Slava Bustos MD     Procedures: see electronic medical records for all procedures/Xrays and details which were not copied into this note but were reviewed prior to creation of Plan. LABS:  I reviewed today's most current labs and imaging studies.   Pertinent labs include:  Recent Labs     06/26/21  0237 06/25/21  1603   WBC 9.9 11.3*   HGB 13.7 15.6   HCT 38.6 43.0    196     Recent Labs     06/26/21  0237 06/25/21  1439   * 122*   K 4.4 5.1   CL 94* 88*   CO2 21 26   GLU 98 122*   BUN 30* 32*   CREA 1.44* 2.02*   CA 9.3 10.7*   ALB 3.5 4.2   TBILI 0.6 0.8   ALT 18 20       Signed: Slava Bustos MD

## 2021-06-27 VITALS
HEIGHT: 62 IN | TEMPERATURE: 97.4 F | BODY MASS INDEX: 25.03 KG/M2 | DIASTOLIC BLOOD PRESSURE: 72 MMHG | WEIGHT: 136 LBS | HEART RATE: 61 BPM | OXYGEN SATURATION: 99 % | RESPIRATION RATE: 16 BRPM | SYSTOLIC BLOOD PRESSURE: 139 MMHG

## 2021-06-27 LAB
ANION GAP SERPL CALC-SCNC: 5 MMOL/L (ref 5–15)
ANION GAP SERPL CALC-SCNC: 7 MMOL/L (ref 5–15)
BACTERIA SPEC CULT: NORMAL
BUN SERPL-MCNC: 13 MG/DL (ref 6–20)
BUN SERPL-MCNC: 16 MG/DL (ref 6–20)
BUN/CREAT SERPL: 10 (ref 12–20)
BUN/CREAT SERPL: 14 (ref 12–20)
CALCIUM SERPL-MCNC: 8.8 MG/DL (ref 8.5–10.1)
CALCIUM SERPL-MCNC: 9 MG/DL (ref 8.5–10.1)
CHLORIDE SERPL-SCNC: 101 MMOL/L (ref 97–108)
CHLORIDE SERPL-SCNC: 101 MMOL/L (ref 97–108)
CO2 SERPL-SCNC: 23 MMOL/L (ref 21–32)
CO2 SERPL-SCNC: 24 MMOL/L (ref 21–32)
CREAT SERPL-MCNC: 1.17 MG/DL (ref 0.55–1.02)
CREAT SERPL-MCNC: 1.24 MG/DL (ref 0.55–1.02)
GLUCOSE BLD STRIP.AUTO-MCNC: 117 MG/DL (ref 65–117)
GLUCOSE BLD STRIP.AUTO-MCNC: 99 MG/DL (ref 65–117)
GLUCOSE SERPL-MCNC: 102 MG/DL (ref 65–100)
GLUCOSE SERPL-MCNC: 127 MG/DL (ref 65–100)
POTASSIUM SERPL-SCNC: 4.2 MMOL/L (ref 3.5–5.1)
POTASSIUM SERPL-SCNC: 4.5 MMOL/L (ref 3.5–5.1)
SERVICE CMNT-IMP: NORMAL
SODIUM SERPL-SCNC: 129 MMOL/L (ref 136–145)
SODIUM SERPL-SCNC: 132 MMOL/L (ref 136–145)

## 2021-06-27 PROCEDURE — 74011250637 HC RX REV CODE- 250/637: Performed by: INTERNAL MEDICINE

## 2021-06-27 PROCEDURE — 82962 GLUCOSE BLOOD TEST: CPT

## 2021-06-27 PROCEDURE — 36415 COLL VENOUS BLD VENIPUNCTURE: CPT

## 2021-06-27 PROCEDURE — 80048 BASIC METABOLIC PNL TOTAL CA: CPT

## 2021-06-27 PROCEDURE — 74011250636 HC RX REV CODE- 250/636: Performed by: INTERNAL MEDICINE

## 2021-06-27 RX ORDER — ATENOLOL 25 MG/1
12.5 TABLET ORAL
Qty: 15 TABLET | Refills: 1 | Status: SHIPPED | OUTPATIENT
Start: 2021-06-28 | End: 2021-08-27

## 2021-06-27 RX ADMIN — ENOXAPARIN SODIUM 30 MG: 30 INJECTION SUBCUTANEOUS at 09:59

## 2021-06-27 RX ADMIN — ATENOLOL 12.5 MG: 25 TABLET ORAL at 06:13

## 2021-06-27 RX ADMIN — PANTOPRAZOLE SODIUM 40 MG: 40 TABLET, DELAYED RELEASE ORAL at 09:59

## 2021-06-27 RX ADMIN — ASPIRIN 81 MG CHEWABLE TABLET 81 MG: 81 TABLET CHEWABLE at 09:59

## 2021-06-27 RX ADMIN — SODIUM CHLORIDE 75 ML/HR: 9 INJECTION, SOLUTION INTRAVENOUS at 07:52

## 2021-06-27 NOTE — DISCHARGE SUMMARY
Hospitalist Discharge Summary     Patient ID:  Juana Kim  [id number]  15 y.o.  1940 6/25/2021    PCP on record: Spencer Estrella MD    Admit date: 6/25/2021  Discharge date and time: 6/27/2021    DISCHARGE DIAGNOSIS:   Hyponatremia sodium 122 POA chronic sodium 126 on 6/18/2021  Acute kidney injury POA admit BUN/creatinine 32 and 2.02  Stage III chronic kidney disease baseline creatinine 1.25 POA  Essential hypertension POA  Hyperlipidemia POA  Recent UTI POA seen in ED 6/18/2021   Diabetes mellitus type II POA   History of chronic lymphocytic leukemia    CONSULTATIONS:  None    Excerpted HPI from H&P of Gonzalez Crabtree MD:    CHIEF COMPLAINT: \"I was having some pains in my stomach, my appetite is been poor and have been feeling lethargic for the past week\", noted to have acute kidney injury and sodium 122 in ED     HISTORY OF PRESENT ILLNESS:     79year-old female     Recently admitted 5/7 to 5/10/2021 with sigmoid diverticulitis  Treated with antibiotics with resolution  Acute kidney injury with a creatinine of 1.75 improved to 1.25 at discharge  Sodium was 124, improved to 135 with IV fluids     Recently seen in the emergency room 6/18/2021  Fatigue, poor appetite, generalized abdominal pain  CT scan abdomen pelvis with IV contrast               Postcholecystectomy, normal common bile duct              Numerous diverticuli without diverticulitis              No hydronephrosis              Overall no acute process  Sodium was 126  Creatinine was 1.24  UA had 10-20 WBC, negative bacteria, no culture was sent  Treated with p.o.  Keflex  Discharged home     No real improvement since ER visit  Not really eating very much per family  Very poor energy and lethargic  Chronic shortness of breath but no change  No dysuria  No chest pain, cough, fevers chills  No abdominal pain, nausea vomiting, diarrhea     Emergency room  Blood pressure borderline low 102/65, heart rate in the low 50s  Sodium 122  BUN and creatinine 32 and 2.02  No fluids in ED  We were called to admit the patient    ______________________________________________________________________  DISCHARGE SUMMARY/HOSPITAL COURSE:  for full details see H&P, daily progress notes, labs, consult notes. Hyponatremia sodium 122 POA chronic sodium 126 on 6/18/2021  Acute kidney injury POA admit BUN/creatinine 32 and 2.02  Stage III chronic kidney disease baseline creatinine 1.25 POA  Presented with nonspecific symptoms for about a week              Poor appetite, lethargy, generalized weakness              No headache, nausea vomiting, diarrhea              Currently alert and oriented x3  Similar sodium 124, creatinine 1.75 during admit for diverticulitis in May 2021              Improved with IV fluids  Recently treated for UTI with Keflex on 6/18/2021              Did receive IV contrast 6/18/2021 with CT scan in ED  Renal US showed no hydronephrosis   Creat improving , NA improving  To 132       Essential hypertension POA  Hyperlipidemia POA  BP borderline low, heart rate in low 50s  Reduce atenolol further to 12.5mg   Hold losartan 100 mg a day with acute kidney injury  Hold spironolactone  Continue aspirin  As needed hydralazine     Recent UTI POA seen in ED 6/18/2021  Prescribed Keflex, taking up until this time  Urine cx negative     Diabetes mellitus type II POA  Resume metformin as creat improved ,  hemoglobin A1c 5.7     History of chronic lymphocytic leukemia          _______________________________________________________________________  Patient seen and examined by me on discharge day. Pertinent Findings:  Gen:    Not in distress  Chest: Clear lungs  CVS:   Regular rhythm.   No edema  Abd:  Soft, not distended, not tender  Neuro:  Alert, orientedx3  _______________________________________________________________________  DISCHARGE MEDICATIONS:   Current Discharge Medication List      CONTINUE these medications which have CHANGED    Details   atenoloL (TENORMIN) 25 mg tablet Take 0.5 Tablets by mouth every morning for 60 days. Qty: 15 Tablet, Refills: 1  Start date: 6/28/2021, End date: 8/27/2021         CONTINUE these medications which have NOT CHANGED    Details   calcium citrate-vitamin D3 (Citracal + D) tablet Take 2 Tablets by mouth daily. calcium polycarbophiL (Fiber-Lax) 625 mg tablet Take 1,250 mg by mouth daily. cholecalciferol (Vitamin D3) (1000 Units /25 mcg) tablet Take 1,000 Units by mouth nightly. ondansetron hcl (Zofran) 4 mg tablet Take 1 Tablet by mouth every eight (8) hours as needed for Nausea. Qty: 20 Tablet, Refills: 0      dicyclomine (BENTYL) 20 mg tablet Take 1 Tablet by mouth every six (6) hours. Qty: 20 Tablet, Refills: 0      risedronate (ACTONEL) 35 mg tablet Take 35 mg by mouth every Tuesday. cyanocobalamin (VITAMIN B-12) 500 mcg tablet Take 500 mcg by mouth every morning. atorvastatin (LIPITOR) 10 mg tablet Take 10 mg by mouth nightly. fexofenadine (ALLEGRA) 180 mg tablet Take 180 mg by mouth nightly. metFORMIN (GLUCOPHAGE) 500 mg tablet Take 500 mg by mouth daily (with breakfast). esomeprazole (NEXIUM) 40 mg capsule Take 40 mg by mouth every morning. aspirin 81 mg tablet Take  by mouth daily. STOP taking these medications       spironolactone (ALDACTONE) 25 mg tablet Comments:   Reason for Stopping:                 Patient Follow Up Instructions:    Activity: Activity as tolerated  Diet: Diabetic Diet  Wound Care: None needed      Follow-up tests/labs  Repeat BMP in a week     Follow-up Information     Follow up With Specialties Details Why Contact Info    Sugar Harley MD Internal Medicine   [place] [address] [place] [address] [facility]  [phone]          ________________________________________________________________    Risk of deterioration: Low    Condition at Discharge: Stable  __________________________________________________________________    Disposition  Home with family, no needs    ____________________________________________________________________    Code Status: Full Code  ___________________________________________________________________      Total time in minutes spent coordinating this discharge (includes going over instructions, follow-up, prescriptions, and preparing report for sign off to her PCP) :  >30 minutes    Signed:  Douglas Elaine MD

## 2021-06-27 NOTE — PROGRESS NOTES
End of Shift Note    Bedside shift change report given to Andrea (oncoming nurse) by Suzette Olivas (offgoing nurse). Report included the following information SBAR, Kardex, Intake/Output, MAR and Recent Results    Shift worked:  0413-0422     Shift summary and any significant changes:     No complaints overnight, pt became restless and disoriented to time around 2 AM. Got up and removed gown/IV tubing/tele leads and got herself dressed in her own clothing. Says she is ready to leave and is expecting to be discharged today. Concerns for physician to address:  none     Zone phone for oncoming shift:          Activity:  Activity Level: Up with Assistance  Number times ambulated in hallways past shift: 0  Number of times OOB to chair past shift: 0    Cardiac:   Cardiac Monitoring: Yes      Cardiac Rhythm: Sinus Rhythm    Access:   Current line(s): PIV     Genitourinary:   Urinary status: voiding    Respiratory:   O2 Device: None (Room air)  Chronic home O2 use?: NO  Incentive spirometer at bedside: NO     GI:  Last Bowel Movement Date: 06/25/21  Current diet:  ADULT DIET Regular  Passing flatus: YES  Tolerating current diet: YES       Pain Management:   Patient states pain is manageable on current regimen: N/A    Skin:  Sergio Score: 19  Interventions: increase time out of bed    Patient Safety:  Fall Score:  Total Score: 2  Interventions: gripper socks  High Fall Risk: Yes    Length of Stay:  Expected LOS: - - -  Actual LOS: 2      Suzette Olivas

## 2021-06-27 NOTE — DISCHARGE INSTRUCTIONS
DISCHARGE DIAGNOSIS:  Hyponatremia sodium 122 POA chronic sodium 126 on 6/18/2021  Acute kidney injury POA admit BUN/creatinine 32 and 2.02  Stage III chronic kidney disease baseline creatinine 1.25 POA  Essential hypertension POA  Hyperlipidemia POA  Recent UTI POA seen in ED 6/18/2021   Diabetes mellitus type II POA   History of chronic lymphocytic leukemia    MEDICATIONS:  · It is important that you take the medication exactly as they are prescribed. · Keep your medication in the bottles provided by the pharmacist and keep a list of the medication names, dosages, and times to be taken in your wallet. · Do not take other medications without consulting your doctor. Pain Management: per above medications    What to do at Home    Recommended diet:  Diabetic Diet    Recommended activity: Activity as tolerated    If you have questions regarding the hospital related prescriptions or hospital related issues please call [address] [place] [address] at . You can always direct your questions to your primary care doctor if you are unable to reach your hospital physician; your PCP works as an extension of your hospital doctor just like your hospital doctor is an extension of your PCP for your time at the hospital [facility], [facility]). If you experience any of the following symptoms then please call your primary care physician or return to the emergency room if you cannot get hold of your doctor:  Fever, chills, nausea, vomiting, diarrhea, change in mentation, falling, bleeding, shortness of breath    Patient Education        Hyponatremia: Care Instructions  Your Care Instructions     Hyponatremia (say \"et-xk-dhy-TREE-haider-uh\") means that you don't have enough sodium in your blood. It can cause nausea, vomiting, and headaches. Or you may not feel hungry. In serious cases, it can cause seizures, a coma, or even death. Hyponatremia is not a disease.  It is a problem caused by something else, such as medicines or exercising for a long time in hot weather. You can get hyponatremia if you lose a lot of fluids and then you drink a lot of water or other liquids that don't have much sodium. You can also get it if you have kidney, liver, heart, or other health problems. Treatment is focused on getting your sodium levels back to normal.  Follow-up care is a key part of your treatment and safety. Be sure to make and go to all appointments, and call your doctor if you are having problems. It's also a good idea to know your test results and keep a list of the medicines you take. How can you care for yourself at home? · If your doctor recommends it, drink fluids that have sodium. Sports drinks are a good choice. Or you can eat salty foods. · If your doctor recommends it, limit the amount of water you drink. And limit fluids that are mostly water. These include tea, coffee, and juice. · Take your medicines exactly as prescribed. Call your doctor if you have any problems with your medicine. · Get your sodium levels tested when your doctor tells you to. When should you call for help? Call 911 anytime you think you may need emergency care. For example, call if:    · You have a seizure.     · You passed out (lost consciousness). Call your doctor now or seek immediate medical care if:    · You are confused or it is hard to focus.     · You have little or no appetite.     · You feel sick to your stomach or you vomit.     · You have a headache.     · You have mood changes.     · You feel more tired than usual.   Watch closely for changes in your health, and be sure to contact your doctor if:    · You do not get better as expected. Where can you learn more? Go to http://www.gray.com/  Enter U075 in the search box to learn more about \"Hyponatremia: Care Instructions. \"  Current as of: September 23, 2020               Content Version: 12.8  © 9259-2568 [employer]wise, Inc[employer].    Care instructions adapted under license by [employer] (which disclaims liability or warranty for this information). If you have questions about a medical condition or this instruction, always ask your healthcare professional. [address] any warranty or liability for your use of this information.

## 2021-06-28 ENCOUNTER — PATIENT OUTREACH (OUTPATIENT)
Dept: CASE MANAGEMENT | Age: 81
End: 2021-06-28

## 2021-06-28 NOTE — PROGRESS NOTES
Care Transitions Initial Call    Call within 2 business days of discharge: Yes     Patient: Minnie Terry Patient : 1940 MRN: [id number]    Last Discharge [address]       Complaint Diagnosis Description Type Department Provider    21 Abnormal Lab Results Hyponatremia ED to Hosp-Admission (Discharged) (ADMIT) Doris Hare MD; Kaur. .. Was this an external facility discharge? No [facility]    Challenges to be reviewed by the provider   Additional needs identified to be addressed with provider: yes  Medications:  Pt.taking losartan 100 mg daily- (not on med list) will follow up with PCP  Pt.to stop taking Spironolactone - f/u with PCP    Repeat BMP in a week   Component      Latest Ref Rng & Units 2021          10:45 AM   Sodium      136 - 145 mmol/L 132 (L)        Method of communication with provider : none     Discussed COVID-19 related testing which was not done at this time. Test results were not done. Patient informed of results, if available? n/a     Advance Care Planning:   Does patient have an Advance Directive: [address] on file. Inpatient Readmission Risk score: Unplanned Readmit Risk Score: 16    Was this a readmission? no   Patient stated reason for the admission: abd pain, dehydration    Patients top risk factors for readmission: medical condition-CKD   Interventions to address risk factors: Scheduled appointment with PCP-21, Obtained and reviewed discharge summary and/or continuity of care documents, Education of patient/family/caregiver/guardian to support self-management-dehydrations and CKD    Care Transition Nurse (CTN) contacted the patient by telephone to perform post hospital discharge assessment. Verified name and  with patient as identifiers. Provided introduction to self, and explanation of the CTN role.      CTN reviewed discharge instructions, medical action plan and red flags with patient who verbalized understanding. Were discharge instructions available to patient? yes. Reviewed appropriate site of care based on symptoms and resources available to patient including: PCP, When to call 911 and [facility]. Patient given an opportunity to ask questions and does not have any further questions or concerns at this time. The patient agrees to contact the PCP office for questions related to their healthcare. Medication reconciliation was performed with pt/dtr. Jose Sotelo, who verbalizes understanding of administration of home medications. Advised obtaining a 90-day supply of all daily and as-needed medications. Referral to Pharm D needed: no   CONTINUE these medications which have CHANGED     Details   atenoloL (TENORMIN) 25 mg tablet Take 0.5 Tablets by mouth every morning for 60 days. Qty: 15 Tablet, Refills: 1  Start date: 6/28/2021, End date: 8/27/2     STOP taking these medications         spironolactone (ALDACTONE) 25 mg tablet Comments:   Reason for Stopping       Home Health/Outpatient orders at discharge: none      Durable Medical Equipment ordered at discharge: None  Covid Risk Education    Patient decline education  COVID-19 and CDC guidelines. CTN reviewed discharge instructions, medical action plan and red flag symptoms with the patient who verbalized understanding. Discussed COVID vaccination status: no.  Patient was given an opportunity to verbalize any questions and concerns and agrees to contact  health care provider for questions related to their healthcare. Was patient discharged with a pulse oximeter? no    Discussed follow-up appointments. If no appointment was previously scheduled, appointment scheduling offered: yes. Is follow up appointment scheduled within 7 days of discharge? yes. [facility] follow up appointment(s): No future appointments. Plan for follow-up call in 10-14 days based on severity of symptoms and risk factors.   Plan for next call: follow up appointment-pcp  CTN provided contact information for future needs. Goals      Establish PCP relationships and regularly scheduled appointments. 6/29 Patient will attend  follow-up with PCP and specialist, keep a list of medications to bring to f/u appointments. Pt.appt.with PCP, Anel Ruggiero MD 6/29/21. CTN provided pt. information [facility] [phone]) explain briefly type of service; contact information provided, f/u with pt.in 7-14 days. -[address]

## 2021-07-03 NOTE — PROGRESS NOTES
Physician Progress Note      Brigitte Farris  CSN #:                  [id number]  :                       1940  ADMIT DATE:       2021 3:17 PM  DISCH DATE:        2021 1:17 PM  RESPONDING  PROVIDER #:        Misty Cuevas MD          QUERY TEXT:    Patient admitted with lethargy and feeling poor. Noted documentation of Acute Kidney Injury in H&P by IM dated . In order to support the diagnosis of KATI, please include additional clinical indicators in your documentation. Or please document if the diagnosis of KATI has been ruled out after further study. The medical record reflects the following:  Risk Factors: Hx: CLL / DM / GERD / HTN /  Clinical Indicators: 98 74 18 111/63 100%. [employer] Bun/Cr: 32/2.02 ^ 30/1.44 ^ 25/1.45 ^ 23/1.16 ^ 20/1.17 ^ 13/1.24. .. [employer] GFR: 24 ^ 35. [employer] ^ 39. [employer] ^ 40 ^ 43; Previous admit Bun/Cr: .24; GFR: 42. .. [employer] UA: le: large; wbc: 20-50 ; bact: neg. .... [employer] UCx: NG. ... Noted: recent uti. .  Treatment: IVF; UA and UCx; Check urine osmolarity and sodium    Thank you,    Karen Tabor  [facility]      Documentation of ? Acute Renal Failure? is noted in the progress notes. Currently the patient does not meet RIFLE criteria (BSV approved) to support this diagnosis. If you are using another criteria to support this diagnosis, please document this in your progress note. Otherwise, please document in the progress notes the clinical indicators that support this diagnosis or state that the diagnosis has been ruled out.     RIFLE  (BSV Approved)    RISK:  Increased SCr x 1.5 or GFR decrease > 25% (within 7 days)    INJURY:  Increased SCr x 2.0 or GFR decreased > 50%    FAILURE:  Increased SCr x 3.0 or GFR decrease > 75% or SCr >4.0 mg/dL or acute increase >0.5 mg/dL    LOSS:  Persistent acute renal failure = complete loss of kidney function > 4 weeks    END STAGE:  End stage of kidney disease > 3 months      AKIN    STAGE  1:  Increase in SCr >/= 0.3 mg/dL or >/= 150% to 200% (1.5 to 2-fold) from baseline (within 48 hours)    STAGE  2:  Increase in SCr to more than 200% to 300% (>2-3 fold) from baseline    STAGE  3:  Increase in SCr to more than 300% (>3-fold) from baseline or SCr >/= 4.0 mg/dL with an acute increase of at least 0.5 mg/dL or initiation of renal replacement therapy    Defined by Kidney Disease Improving Global Outcomes (KDIGO) clinical practice guideline for acute kidney injury:  -Increase in SCr by greater than or equal to 0.3 mg/dl within 48 hours; or  -Increase or decrease in SCr to greater than or equal to 1.5 times baseline, which is known or presumed to have occurred within the prior 7 days; or  -Urine volume < 0.5ml/kg/h for 6 hours  Options provided:  -- Acute kidney injury evidenced by, Please document evidence as well as baseline creatinine, if known. -- Currently resolved acute kidney injury was evidenced by, Please document evidence as well as baseline creatinine, if known. -- Acute kidney injury ruled out after study  -- Other - I will add my own diagnosis  -- Disagree - Not applicable / Not valid  -- Disagree - Clinically unable to determine / Unknown  -- Refer to Clinical Documentation Reviewer    PROVIDER RESPONSE TEXT:    Provider disagreed with this query.   I didnt see the patient    Query created by: Ángel Monaco on 6/30/2021 4:39 PM      Electronically signed by:  Bekah Longoria MD 7/3/2021 7:56 AM

## 2021-07-09 ENCOUNTER — PATIENT OUTREACH (OUTPATIENT)
Dept: CASE MANAGEMENT | Age: 81
End: 2021-07-09

## 2021-07-09 NOTE — PROGRESS NOTES
Goals      Establish PCP relationships and regularly scheduled appointments. 6/29 Patient will attend  follow-up with PCP and specialist, keep a list of medications to bring to f/u appointments. Pt.appt.with PCP, Ritchie Sheffield MD 6/29/21. CTN provided pt. information [facility] [phone]) explain briefly type of service; contact information provided, f/u with pt.in 7-14 days. -[address]       7/9 Pt.reports attend hosp.follow up with PCP, Ritchie Sheffield MD 6/29/21. Pt.reports she is doing well, on vacation with grand-dtr. , will return home in a week. CTN will follow up with pt.in 7-14 days. -[address]

## 2021-07-27 ENCOUNTER — PATIENT OUTREACH (OUTPATIENT)
Dept: CASE MANAGEMENT | Age: 81
End: 2021-07-27

## 2021-07-27 NOTE — PROGRESS NOTES
Goals      Establish PCP relationships and regularly scheduled appointments. 6/29 Patient will attend  follow-up with PCP and specialist, keep a list of medications to bring to f/u appointments. Pt.appt.with PCP, Vicki Hunter MD 6/29/21. CTN provided pt. information [employer] [facility] [phone]) explain briefly type of service; contact information provided, f/u with pt.in 7-14 days. -[address]       7/9 Pt.reports attend hosp.follow up with PCP, Vicki Hunter MD 6/29/21. Pt.reports she is doing well, on vacation with grand-dtr. , will return home in a week. CTN will follow up with pt.in 7-14 days. -[address]    7/27 Pt.reports she is doing well, PO intake improved, attend appt. with PCP, Vicki Hunter MD 7/27/21. -[address]

## 2021-07-29 ENCOUNTER — PATIENT OUTREACH (OUTPATIENT)
Dept: CASE MANAGEMENT | Age: 81
End: 2021-07-29

## 2021-07-29 NOTE — PROGRESS NOTES
Patient has graduated from the Transitions of Care Coordination  program on 7/29/21. Patient/family has the ability to self-manage at this time Care management goals have been completed. Patient was referred to the [place] team for further management. (A.M.)   Goals      Establish PCP relationships and regularly scheduled appointments. 6/29 Patient will attend  follow-up with PCP and specialist, keep a list of medications to bring to f/u appointments. Pt.appt.with PCP, Cherri Marino MD 6/29/21. CTN provided pt. information [employer] [facility] [phone]) explain briefly type of service; contact information provided, f/u with pt.in 7-14 days. -[address]       7/9 Pt.reports attend hosp.follow up with PCP, Cherri Marino MD 6/29/21. Pt.reports she is doing well, on vacation with grand-dtr. , will return home in a week. CTN will follow up with pt.in 7-14 days. -[address]    7/27 Pt.reports she is doing well, PO intake improved, attend appt. with PCP, Cherri Marino MD 7/27/21. -[address]                           Patient has Care Transition Nurse's contact information for any further questions, concerns, or needs. Patients upcoming visits:  No future appointments.

## 2021-08-06 ENCOUNTER — PATIENT OUTREACH (OUTPATIENT)
Dept: CASE MANAGEMENT | Age: 81
End: 2021-08-06

## 2021-08-06 NOTE — PROGRESS NOTES
Ambulatory Care Management Note    Date/Time:  8/6/2021 2:47 PM    This patient was received as a referral from Daily assignment. Ambulatory Care Manager outreached to patient today to offer care management services. Introduction to self and role of care manager provided. Patient declined care management services at this time. No follow up call scheduled at this time. Patient has Ambulatory Care Manager's contact number for for any questions or concerns. Patient completed GIOVANY period post hospitalization. Reports no continued problems or problems scheduling. Reports no further healthcare goals. Declines further CCM follow up at this time but will reach out should further needs arise.  [place]

## 2022-03-18 PROBLEM — E87.1 HYPONATREMIA: Status: ACTIVE | Noted: 2021-06-25

## 2022-03-19 PROBLEM — K44.9 HIATAL HERNIA WITH GERD: Status: ACTIVE | Noted: 2018-08-09

## 2022-03-19 PROBLEM — K57.92 ACUTE DIVERTICULITIS: Status: ACTIVE | Noted: 2021-05-07

## 2022-03-19 PROBLEM — K21.9 HIATAL HERNIA WITH GERD: Status: ACTIVE | Noted: 2018-08-09

## 2022-03-20 PROBLEM — K44.9 HIATAL HERNIA: Status: ACTIVE | Noted: 2018-07-24

## 2023-01-06 ENCOUNTER — APPOINTMENT (OUTPATIENT)
Dept: GENERAL RADIOLOGY | Age: 83
End: 2023-01-06
Attending: EMERGENCY MEDICINE
Payer: MEDICARE

## 2023-01-06 ENCOUNTER — APPOINTMENT (OUTPATIENT)
Dept: ULTRASOUND IMAGING | Age: 83
End: 2023-01-06
Attending: EMERGENCY MEDICINE
Payer: MEDICARE

## 2023-01-06 ENCOUNTER — HOSPITAL ENCOUNTER (EMERGENCY)
Age: 83
Discharge: HOME | End: 2023-01-06
Attending: EMERGENCY MEDICINE
Payer: MEDICARE

## 2023-01-06 ENCOUNTER — APPOINTMENT (OUTPATIENT)
Dept: CT IMAGING | Age: 83
End: 2023-01-06
Attending: EMERGENCY MEDICINE
Payer: MEDICARE

## 2023-01-06 VITALS
OXYGEN SATURATION: 98 % | HEART RATE: 62 BPM | RESPIRATION RATE: 18 BRPM | SYSTOLIC BLOOD PRESSURE: 217 MMHG | WEIGHT: 135.8 LBS | BODY MASS INDEX: 26.66 KG/M2 | TEMPERATURE: 98.1 F | HEIGHT: 60 IN | DIASTOLIC BLOOD PRESSURE: 97 MMHG

## 2023-01-06 DIAGNOSIS — N30.01 ACUTE CYSTITIS WITH HEMATURIA: ICD-10-CM

## 2023-01-06 DIAGNOSIS — R03.0 ELEVATED BLOOD PRESSURE READING: ICD-10-CM

## 2023-01-06 DIAGNOSIS — M79.604 BILATERAL LEG PAIN: Primary | ICD-10-CM

## 2023-01-06 DIAGNOSIS — M79.605 BILATERAL LEG PAIN: Primary | ICD-10-CM

## 2023-01-06 DIAGNOSIS — M77.31 HEEL SPUR, RIGHT: ICD-10-CM

## 2023-01-06 LAB
ANION GAP SERPL CALC-SCNC: 6 MMOL/L (ref 5–15)
APPEARANCE UR: CLEAR
BACTERIA URNS QL MICRO: NEGATIVE /HPF
BASOPHILS # BLD: 0.1 K/UL (ref 0–0.1)
BASOPHILS NFR BLD: 1 % (ref 0–1)
BILIRUB UR QL: NEGATIVE
BUN SERPL-MCNC: 8 MG/DL (ref 6–20)
BUN/CREAT SERPL: 8 (ref 12–20)
CALCIUM SERPL-MCNC: 9.3 MG/DL (ref 8.5–10.1)
CHLORIDE SERPL-SCNC: 102 MMOL/L (ref 97–108)
CO2 SERPL-SCNC: 28 MMOL/L (ref 21–32)
COLOR UR: ABNORMAL
CREAT SERPL-MCNC: 1.01 MG/DL (ref 0.55–1.02)
DIFFERENTIAL METHOD BLD: ABNORMAL
EOSINOPHIL # BLD: 0.2 K/UL (ref 0–0.4)
EOSINOPHIL NFR BLD: 3 % (ref 0–7)
EPITH CASTS URNS QL MICRO: ABNORMAL /LPF
ERYTHROCYTE [DISTWIDTH] IN BLOOD BY AUTOMATED COUNT: 13.7 % (ref 11.5–14.5)
GLUCOSE SERPL-MCNC: 87 MG/DL (ref 65–100)
GLUCOSE UR STRIP.AUTO-MCNC: NEGATIVE MG/DL
HCT VFR BLD AUTO: 39.2 % (ref 35–47)
HGB BLD-MCNC: 13.5 G/DL (ref 11.5–16)
HGB UR QL STRIP: NEGATIVE
IMM GRANULOCYTES # BLD AUTO: 0.1 K/UL (ref 0–0.04)
IMM GRANULOCYTES NFR BLD AUTO: 1 % (ref 0–0.5)
KETONES UR QL STRIP.AUTO: ABNORMAL MG/DL
LEUKOCYTE ESTERASE UR QL STRIP.AUTO: ABNORMAL
LYMPHOCYTES # BLD: 2.8 K/UL (ref 0.8–3.5)
LYMPHOCYTES NFR BLD: 34 % (ref 12–49)
MCH RBC QN AUTO: 30.5 PG (ref 26–34)
MCHC RBC AUTO-ENTMCNC: 34.4 G/DL (ref 30–36.5)
MCV RBC AUTO: 88.5 FL (ref 80–99)
MONOCYTES # BLD: 0.6 K/UL (ref 0–1)
MONOCYTES NFR BLD: 7 % (ref 5–13)
NEUTS SEG # BLD: 4.5 K/UL (ref 1.8–8)
NEUTS SEG NFR BLD: 54 % (ref 32–75)
NITRITE UR QL STRIP.AUTO: NEGATIVE
NRBC # BLD: 0 K/UL (ref 0–0.01)
NRBC BLD-RTO: 0 PER 100 WBC
PH UR STRIP: 6.5 (ref 5–8)
PLATELET # BLD AUTO: 150 K/UL (ref 150–400)
PMV BLD AUTO: 10.2 FL (ref 8.9–12.9)
POTASSIUM SERPL-SCNC: 4 MMOL/L (ref 3.5–5.1)
PROT UR STRIP-MCNC: NEGATIVE MG/DL
RBC # BLD AUTO: 4.43 M/UL (ref 3.8–5.2)
RBC #/AREA URNS HPF: ABNORMAL /HPF (ref 0–5)
SODIUM SERPL-SCNC: 136 MMOL/L (ref 136–145)
SP GR UR REFRACTOMETRY: 1.02
UA: UC IF INDICATED,UAUC: ABNORMAL
UROBILINOGEN UR QL STRIP.AUTO: 1 EU/DL (ref 0.2–1)
WBC # BLD AUTO: 8.3 K/UL (ref 3.6–11)
WBC URNS QL MICRO: ABNORMAL /HPF (ref 0–4)

## 2023-01-06 PROCEDURE — 85025 COMPLETE CBC W/AUTO DIFF WBC: CPT

## 2023-01-06 PROCEDURE — 74011250637 HC RX REV CODE- 250/637: Performed by: EMERGENCY MEDICINE

## 2023-01-06 PROCEDURE — 70450 CT HEAD/BRAIN W/O DYE: CPT

## 2023-01-06 PROCEDURE — 93970 EXTREMITY STUDY: CPT

## 2023-01-06 PROCEDURE — 81001 URINALYSIS AUTO W/SCOPE: CPT

## 2023-01-06 PROCEDURE — 73630 X-RAY EXAM OF FOOT: CPT

## 2023-01-06 PROCEDURE — 36415 COLL VENOUS BLD VENIPUNCTURE: CPT

## 2023-01-06 PROCEDURE — 99284 EMERGENCY DEPT VISIT MOD MDM: CPT

## 2023-01-06 PROCEDURE — 80048 BASIC METABOLIC PNL TOTAL CA: CPT

## 2023-01-06 RX ORDER — CIPROFLOXACIN 500 MG/1
500 TABLET ORAL 2 TIMES DAILY
Qty: 6 TABLET | Refills: 0 | Status: SHIPPED | OUTPATIENT
Start: 2023-01-06 | End: 2023-01-09

## 2023-01-06 RX ORDER — METHOCARBAMOL 750 MG/1
750 TABLET, FILM COATED ORAL
Qty: 20 TABLET | Refills: 0 | Status: SHIPPED | OUTPATIENT
Start: 2023-01-06

## 2023-01-06 RX ORDER — LOSARTAN POTASSIUM 50 MG/1
50 TABLET ORAL
Status: COMPLETED | OUTPATIENT
Start: 2023-01-06 | End: 2023-01-06

## 2023-01-06 RX ADMIN — LOSARTAN POTASSIUM 50 MG: 50 TABLET, FILM COATED ORAL at 15:04

## 2023-01-06 NOTE — ED NOTES
Taty BARBA reviewed discharge instructions with the patient and family. The patient verbalized understanding. All questions and concerns were addressed. The patient declined a wheelchair and is discharged ambulatory in the care of family members with instructions and prescriptions in hand. Pt is alert and oriented x 4. Respirations are clear and unlabored.

## 2023-01-06 NOTE — ED PROVIDER NOTES
[facility] EMERGENCY DEPT  EMERGENCY DEPARTMENT ENCOUNTER       Pt Name: Daron Rebolledo  MRN: [id number]  [place] 1940  Date of evaluation: 1/6/2023  Provider: Elba Reeves MD   PCP: Shade Cruz MD  Note Started: 12:05 PM 1/6/23     CHIEF COMPLAINT       Chief Complaint   Patient presents with    Leg Pain     Pt ambulatory into triage with a cc of right leg and foot pain x 1 day; PCP sent patient to ED to r/o blood clot; family also concerned for UTI        HISTORY OF PRESENT ILLNESS: 1 or more elements      History From: Patient and daughter, History limited by: none     Daron Rebolledo is a 79 y.o. female who presents right leg pain, and possible UTI. Patient has a history of CLL, diabetes, anemia and hypertension. Last week, she was complaining of pain involving her left lower extremity. That resolved spontaneously. She has had intermittent right leg and foot pain since yesterday. She denies trauma. She states the pain was so bad last night that she could not sleep. That was primarily located in her right foot. She denies any pain currently, at rest.  She denies chest pain, shortness of breath, fever, cough or dizziness. She has also had difficulty urinating x1 day, so family would like her urine to be analyzed. In addition, her daughter states that she has not had any lab work performed in 11 months, she is concerned that her sodium may be low or that her renal function studies may be more elevated than normal.  The blood pressure is higher than normal.  She says she has been taking her blood pressure medicines appropriately. She denies any headache currently, but states she did have a headache this morning. She said that was of mild to moderate severity, and resolved spontaneously. Nursing Notes were all reviewed and agreed with or any disagreements were addressed in the HPI. REVIEW OF SYSTEMS        Positives and Pertinent negatives as per HPI.     PAST HISTORY     Past Medical History:  Past Medical History:   Diagnosis Date    Anemia 6/18/2013    Cancer ([facility] [address]     CLL    Diabetes ([facility] [address]     Epigastric pain 6/18/2013    Family history of colon cancer 6/18/2013    sister    GERD (gastroesophageal reflux disease)     Hypertension     Ill-defined condition 2009    blood tranfusion hx       Past Surgical History:  Past Surgical History:   Procedure Laterality Date    HX CATARACT REMOVAL      bilateral    HX CHOLECYSTECTOMY      HX HERNIA REPAIR  08/09/2018    Sophia Monday hiatal hernia- Patricia Diehl MD    HX KNEE ARTHROSCOPY      LEFT    HX OTHER SURGICAL      portacath/removed    HX TUBAL LIGATION      HX VASCULAR ACCESS      SERGE CATH LL CHEST       Family History:  Family History   Problem Relation Age of Onset    Heart Disease Mother     Arthritis-rheumatoid Mother        Social History:  Social History     Tobacco Use    Smoking status: Never    Smokeless tobacco: Never   Substance Use Topics    Alcohol use: No    Drug use: Yes     Types: Prescription, OTC       Allergies: Allergies   Allergen Reactions    Penicillins Rash     Unsure of reaction, has been 20yrs since given and cannot remember extent of allergy, but has been avoiding this drug class       CURRENT MEDICATIONS      Previous Medications    ASPIRIN 81 MG TABLET    Take  by mouth daily. ATORVASTATIN (LIPITOR) 10 MG TABLET    Take 10 mg by mouth nightly. CALCIUM CITRATE-VITAMIN D3 (CITRACAL + D) TABLET    Take 2 Tablets by mouth daily. CALCIUM POLYCARBOPHIL (FIBER-LAX) 625 MG TABLET    Take 1,250 mg by mouth daily. CHOLECALCIFEROL (VITAMIN D3) (1000 UNITS /25 MCG) TABLET    Take 1,000 Units by mouth nightly. CYANOCOBALAMIN (VITAMIN B-12) 500 MCG TABLET    Take 500 mcg by mouth every morning. DICYCLOMINE (BENTYL) 20 MG TABLET    Take 1 Tablet by mouth every six (6) hours. ESOMEPRAZOLE (NEXIUM) 40 MG CAPSULE    Take 40 mg by mouth every morning.     FEXOFENADINE (ALLEGRA) 180 MG TABLET    Take 180 mg by mouth nightly. METFORMIN (GLUCOPHAGE) 500 MG TABLET    Take 500 mg by mouth daily (with breakfast). ONDANSETRON HCL (ZOFRAN) 4 MG TABLET    Take 1 Tablet by mouth every eight (8) hours as needed for Nausea. RISEDRONATE (ACTONEL) 35 MG TABLET    Take 35 mg by mouth every Tuesday. SCREENINGS               No data recorded         PHYSICAL EXAM      ED Triage Vitals [01/06/23 1139]    ED Encounter Vitals Group       BP (!) 199/97       Pulse (Heart Rate) 62       Resp Rate 18       Temp 98.1 °F (36.7 °C)       Temp src        O2 Sat (%) 100 %       Weight 135 lb 12.9 oz       Height 5'         Physical Exam  Vitals and nursing note reviewed. Constitutional:       General: She is not in acute distress. Appearance: She is well-developed. HENT:      Head: Normocephalic. Cardiovascular:      Rate and Rhythm: Normal rate and regular rhythm. Pulses: Normal pulses. Heart sounds: Normal heart sounds. Pulmonary:      Effort: Pulmonary effort is normal.      Breath sounds: Normal breath sounds. Abdominal:      General: Bowel sounds are normal.      Palpations: Abdomen is soft. Tenderness: There is no abdominal tenderness. Musculoskeletal:         General: Tenderness present. Normal range of motion. Cervical back: Normal range of motion and neck supple. Comments: Right foot and calf tenderness, 2+ distal pulses   Skin:     General: Skin is warm and dry. Neurological:      General: No focal deficit present. Mental Status: She is alert and oriented to person, place, and time.    Psychiatric:         Mood and Affect: Mood normal.         Behavior: Behavior normal.        DIAGNOSTIC RESULTS   LABS:     Recent Results (from the past 12 hour(s))   URINALYSIS W/ REFLEX CULTURE    Collection Time: 01/06/23 11:47 AM    Specimen: Urine   Result Value Ref Range    Color YELLOW/STRAW      Appearance CLEAR CLEAR      Specific gravity 1.017      pH (UA) 6.5 5.0 - 8.0      Protein Negative NEG mg/dL    Glucose Negative NEG mg/dL    Ketone TRACE (A) NEG mg/dL    Bilirubin Negative NEG      Blood Negative NEG      Urobilinogen 1.0 0.2 - 1.0 EU/dL    Nitrites Negative NEG      Leukocyte Esterase MODERATE (A) NEG      WBC 5-10 0 - 4 /hpf    RBC 5-10 0 - 5 /hpf    Epithelial cells FEW FEW /lpf    Bacteria Negative NEG /hpf    UA:UC IF INDICATED CULTURE NOT INDICATED BY UA RESULT CNI          EKG: If performed, independent interpretation documented below in the MDM section     RADIOLOGY:  Non-plain film images such as CT, Ultrasound and MRI are read by the radiologist. Plain radiographic images are visualized and preliminarily interpreted by the ED Provider with the findings documented in the MDM section. Interpretation per the Radiologist below, if available at the time of this note:     XR FOOT RT MIN 3 V    Result Date: 1/6/2023  EXAM: XR FOOT RT MIN 3 V INDICATION: Right foot pain. COMPARISON: None. FINDINGS: Three views of the right foot demonstrate no fracture or other acute osseous or articular abnormality. The soft tissues are within normal limits. There is marked osteopenia. Incidental note is made of small plantar spur. Osteopenic right foot    DUPLEX LOWER EXT VENOUS BILAT    Result Date: 1/6/2023  · No evidence of acute deep vein thrombosis in the right common femoral, femoral, popliteal, posterior tibial, and peroneal veins. The veins were imaged in the transverse and longitudinal planes. The vessels showed normal color filling and compressibility. Doppler interrogation showed phasic and spontaneous flow. · No evidence of acute deep vein thrombosis in the left common femoral, femoral, popliteal, posterior tibial, and peroneal veins. The veins were imaged in the transverse and longitudinal planes. The vessels showed normal color filling and compressibility. Doppler interrogation showed phasic and spontaneous flow.  · Hx pain         PROCEDURES   Unless otherwise noted below, none  Procedures     CRITICAL CARE TIME   0    EMERGENCY DEPARTMENT COURSE and DIFFERENTIAL DIAGNOSIS/MDM   Vitals:    Vitals:    01/06/23 1139   BP: (!) 199/97   Pulse: 62   Resp: 18   Temp: 98.1 °F (36.7 °C)   SpO2: 100%   Weight: 61.6 kg (135 lb 12.9 oz)   Height: 5' (1.524 m)        Patient was given the following medications:  Medications - No data to display    Medical Decision Making  Differential includes DVT, Baker's cyst, musculoskeletal pain, arthritis, fracture, contusion, UTI. Family members would like the patient's lab work evaluated, because she has a history of renal disease and hyponatremia, and has not had any lab work performed in the last 11 months. In addition, her blood pressure has worsened. Even though she does not have a headache currently, I am going to get a head CT and treat her blood pressure. Problems Addressed:  Acute cystitis with hematuria: acute illness or injury  Bilateral leg pain: acute illness or injury  Elevated blood pressure reading: acute illness or injury    Amount and/or Complexity of Data Reviewed  Independent Historian: caregiver     Details: Daughter  Labs: ordered. Radiology: ordered and independent interpretation performed. ECG/medicine tests: ordered. Risk  Prescription drug management. FINAL IMPRESSION     1. Bilateral leg pain    2. Heel spur, right    3. Acute cystitis with hematuria    4. Elevated blood pressure reading          DISPOSITION/PLAN   Juani Cordell Martinez's  results have been reviewed with her. She has been counseled regarding her diagnosis, treatment, and plan. She verbally conveys understanding and agreement of the signs, symptoms, diagnosis, treatment and prognosis and additionally agrees to follow up as discussed. She also agrees with the care-plan and conveys that all of her questions have been answered.   I have also provided discharge instructions for her that include: educational information regarding their diagnosis and treatment, and list of reasons why they would want to return to the ED prior to their follow-up appointment, should her condition change. CLINICAL IMPRESSION    Discharge Note: The patient is stable for discharge home. The signs, symptoms, diagnosis, and discharge instructions have been discussed, understanding conveyed, and agreed upon. The patient is to follow up as recommended or return to ER should their symptoms worsen. PATIENT REFERRED TO:  Follow-up Information       Follow up With Specialties Details Why Contact Info    Anita Larson MD Internal Medicine Physician Call in 3 days Regarding your blood pressure [address]  [address] [address] [phone]      [facility] EMERG[facility] Emergency Medicine  If symptoms worsen [address] [place] [address]  [phone]              DISCHARGE MEDICATIONS:  Discharge Medication List as of 1/6/2023  3:22 PM        START taking these medications    Details   methocarbamoL (Robaxin-750) 750 mg tablet Take 1 Tablet by mouth three (3) times daily as needed for Muscle Spasm(s). , Normal, Disp-20 Tablet, R-0      ciprofloxacin HCl (Cipro) 500 mg tablet Take 1 Tablet by mouth two (2) times a day for 3 days. , Normal, Disp-6 Tablet, R-0           CONTINUE these medications which have NOT CHANGED    Details   calcium citrate-vitamin D3 (Citracal + D) tablet Take 2 Tablets by mouth daily. , Historical Med      calcium polycarbophiL (Fiber-Lax) 625 mg tablet Take 1,250 mg by mouth daily. , Historical Med      cholecalciferol (Vitamin D3) (1000 Units /25 mcg) tablet Take 1,000 Units by mouth nightly., Historical Med      ondansetron hcl (Zofran) 4 mg tablet Take 1 Tablet by mouth every eight (8) hours as needed for Nausea., Normal, Disp-20 Tablet, R-0      dicyclomine (BENTYL) 20 mg tablet Take 1 Tablet by mouth every six (6) hours. , Normal, Disp-20 Tablet, R-0      risedronate (ACTONEL) 35 mg tablet Take 35 mg by mouth every Tuesday., Historical Med      cyanocobalamin (VITAMIN B-12) 500 mcg tablet Take 500 mcg by mouth every morning., Historical Med      atorvastatin (LIPITOR) 10 mg tablet Take 10 mg by mouth nightly., Historical Med      fexofenadine (ALLEGRA) 180 mg tablet Take 180 mg by mouth nightly., Historical Med      metFORMIN (GLUCOPHAGE) 500 mg tablet Take 500 mg by mouth daily (with breakfast). , Historical Med      esomeprazole (NEXIUM) 40 mg capsule Take 40 mg by mouth every morning., Historical Med      aspirin 81 mg tablet Take  by mouth daily. , Historical Med               DISCONTINUED MEDICATIONS:  Current Discharge Medication List          I am the Primary Clinician of Record. Dalton Rodas MD (electronically signed)    (Please note that parts of this dictation were completed with voice recognition software. Quite often unanticipated grammatical, syntax, homophones, and other interpretive errors are inadvertently transcribed by the computer software. Please disregards these errors.  Please excuse any errors that have escaped final proofreading.)

## 2023-09-11 ENCOUNTER — TELEPHONE (OUTPATIENT)
Age: 83
End: 2023-09-11

## 2023-09-11 NOTE — TELEPHONE ENCOUNTER
Called patient to schedule new patient appointment from referral; patient not ready to make appointment yet and will call back in a few weeks.

## 2024-12-23 ENCOUNTER — APPOINTMENT (OUTPATIENT)
Facility: HOSPITAL | Age: 84
End: 2024-12-23
Payer: MEDICARE

## 2024-12-23 ENCOUNTER — HOSPITAL ENCOUNTER (EMERGENCY)
Facility: HOSPITAL | Age: 84
Discharge: HOME | End: 2024-12-23
Attending: EMERGENCY MEDICINE
Payer: MEDICARE

## 2024-12-23 VITALS
RESPIRATION RATE: 15 BRPM | HEART RATE: 64 BPM | OXYGEN SATURATION: 95 % | TEMPERATURE: 97.5 F | BODY MASS INDEX: 23.27 KG/M2 | SYSTOLIC BLOOD PRESSURE: 156 MMHG | HEIGHT: 61 IN | DIASTOLIC BLOOD PRESSURE: 70 MMHG | WEIGHT: 123.24 LBS

## 2024-12-23 DIAGNOSIS — D72.829 LEUKOCYTOSIS, UNSPECIFIED TYPE: Primary | ICD-10-CM

## 2024-12-23 LAB
ALBUMIN SERPL-MCNC: 3.4 G/DL (ref 3.5–5)
ALBUMIN/GLOB SERPL: 1.1 (ref 1.1–2.2)
ALP SERPL-CCNC: 72 U/L (ref 45–117)
ALT SERPL-CCNC: 16 U/L (ref 12–78)
ANION GAP SERPL CALC-SCNC: 5 MMOL/L (ref 2–12)
APPEARANCE UR: CLEAR
AST SERPL-CCNC: 18 U/L (ref 15–37)
BACTERIA URNS QL MICRO: NEGATIVE /HPF
BASOPHILS # BLD: 0 K/UL (ref 0–0.1)
BASOPHILS NFR BLD: 0 % (ref 0–1)
BILIRUB SERPL-MCNC: 0.3 MG/DL (ref 0.2–1)
BILIRUB UR QL: NEGATIVE
BUN SERPL-MCNC: 9 MG/DL (ref 6–20)
BUN/CREAT SERPL: 9 (ref 12–20)
CALCIUM SERPL-MCNC: 9.6 MG/DL (ref 8.5–10.1)
CHLORIDE SERPL-SCNC: 100 MMOL/L (ref 97–108)
CO2 SERPL-SCNC: 27 MMOL/L (ref 21–32)
COLOR UR: ABNORMAL
CREAT SERPL-MCNC: 0.96 MG/DL (ref 0.55–1.02)
DIFFERENTIAL METHOD BLD: ABNORMAL
EOSINOPHIL # BLD: 0.2 K/UL (ref 0–0.4)
EOSINOPHIL NFR BLD: 1 % (ref 0–7)
EPITH CASTS URNS QL MICRO: ABNORMAL /LPF
ERYTHROCYTE [DISTWIDTH] IN BLOOD BY AUTOMATED COUNT: 13.9 % (ref 11.5–14.5)
FLUAV RNA SPEC QL NAA+PROBE: NOT DETECTED
FLUBV RNA SPEC QL NAA+PROBE: NOT DETECTED
GLOBULIN SER CALC-MCNC: 3.2 G/DL (ref 2–4)
GLUCOSE BLD STRIP.AUTO-MCNC: 102 MG/DL (ref 65–117)
GLUCOSE SERPL-MCNC: 129 MG/DL (ref 65–100)
GLUCOSE UR STRIP.AUTO-MCNC: NEGATIVE MG/DL
HCT VFR BLD AUTO: 39.6 % (ref 35–47)
HGB BLD-MCNC: 13.5 G/DL (ref 11.5–16)
HGB UR QL STRIP: NEGATIVE
HYALINE CASTS URNS QL MICRO: ABNORMAL /LPF (ref 0–2)
IMM GRANULOCYTES # BLD AUTO: 0 K/UL (ref 0–0.04)
IMM GRANULOCYTES NFR BLD AUTO: 0 % (ref 0–0.5)
KETONES UR QL STRIP.AUTO: NEGATIVE MG/DL
LACTATE BLD-SCNC: 1.91 MMOL/L (ref 0.4–2)
LEUKOCYTE ESTERASE UR QL STRIP.AUTO: NEGATIVE
LIPASE SERPL-CCNC: 51 U/L (ref 13–75)
LYMPHOCYTES # BLD: 11.4 K/UL (ref 0.8–3.5)
LYMPHOCYTES NFR BLD: 56 % (ref 12–49)
MCH RBC QN AUTO: 30.5 PG (ref 26–34)
MCHC RBC AUTO-ENTMCNC: 34.1 G/DL (ref 30–36.5)
MCV RBC AUTO: 89.6 FL (ref 80–99)
MONOCYTES # BLD: 0.6 K/UL (ref 0–1)
MONOCYTES NFR BLD: 3 % (ref 5–13)
NEUTS SEG # BLD: 8.1 K/UL (ref 1.8–8)
NEUTS SEG NFR BLD: 40 % (ref 32–75)
NITRITE UR QL STRIP.AUTO: NEGATIVE
NRBC # BLD: 0 K/UL (ref 0–0.01)
NRBC BLD-RTO: 0 PER 100 WBC
PH UR STRIP: 7 (ref 5–8)
PLATELET # BLD AUTO: 187 K/UL (ref 150–400)
PMV BLD AUTO: 10 FL (ref 8.9–12.9)
POTASSIUM SERPL-SCNC: 4.2 MMOL/L (ref 3.5–5.1)
PROCALCITONIN SERPL-MCNC: <0.05 NG/ML
PROT SERPL-MCNC: 6.6 G/DL (ref 6.4–8.2)
PROT UR STRIP-MCNC: NEGATIVE MG/DL
RBC # BLD AUTO: 4.42 M/UL (ref 3.8–5.2)
RBC #/AREA URNS HPF: ABNORMAL /HPF (ref 0–5)
RBC MORPH BLD: ABNORMAL
SARS-COV-2 RNA RESP QL NAA+PROBE: NOT DETECTED
SERVICE CMNT-IMP: NORMAL
SODIUM SERPL-SCNC: 132 MMOL/L (ref 136–145)
SOURCE: NORMAL
SP GR UR REFRACTOMETRY: 1.01
TROPONIN I SERPL HS-MCNC: <4 NG/L (ref 0–51)
URINE CULTURE IF INDICATED: ABNORMAL
UROBILINOGEN UR QL STRIP.AUTO: 0.2 EU/DL (ref 0.2–1)
WBC # BLD AUTO: 20.3 K/UL (ref 3.6–11)
WBC URNS QL MICRO: ABNORMAL /HPF (ref 0–4)

## 2024-12-23 PROCEDURE — 96360 HYDRATION IV INFUSION INIT: CPT

## 2024-12-23 PROCEDURE — 84145 PROCALCITONIN (PCT): CPT

## 2024-12-23 PROCEDURE — 6360000004 HC RX CONTRAST MEDICATION: Performed by: EMERGENCY MEDICINE

## 2024-12-23 PROCEDURE — 99285 EMERGENCY DEPT VISIT HI MDM: CPT

## 2024-12-23 PROCEDURE — 83605 ASSAY OF LACTIC ACID: CPT

## 2024-12-23 PROCEDURE — 71260 CT THORAX DX C+: CPT

## 2024-12-23 PROCEDURE — 2500000003 HC RX 250 WO HCPCS: Performed by: EMERGENCY MEDICINE

## 2024-12-23 PROCEDURE — 87040 BLOOD CULTURE FOR BACTERIA: CPT

## 2024-12-23 PROCEDURE — 80053 COMPREHEN METABOLIC PANEL: CPT

## 2024-12-23 PROCEDURE — 96374 THER/PROPH/DIAG INJ IV PUSH: CPT

## 2024-12-23 PROCEDURE — 87636 SARSCOV2 & INF A&B AMP PRB: CPT

## 2024-12-23 PROCEDURE — 36415 COLL VENOUS BLD VENIPUNCTURE: CPT

## 2024-12-23 PROCEDURE — 2580000003 HC RX 258: Performed by: EMERGENCY MEDICINE

## 2024-12-23 PROCEDURE — 83690 ASSAY OF LIPASE: CPT

## 2024-12-23 PROCEDURE — 81001 URINALYSIS AUTO W/SCOPE: CPT

## 2024-12-23 PROCEDURE — 6360000002 HC RX W HCPCS: Performed by: EMERGENCY MEDICINE

## 2024-12-23 PROCEDURE — 82962 GLUCOSE BLOOD TEST: CPT

## 2024-12-23 PROCEDURE — 93005 ELECTROCARDIOGRAM TRACING: CPT | Performed by: INTERNAL MEDICINE

## 2024-12-23 PROCEDURE — 71045 X-RAY EXAM CHEST 1 VIEW: CPT

## 2024-12-23 PROCEDURE — 96361 HYDRATE IV INFUSION ADD-ON: CPT

## 2024-12-23 PROCEDURE — 85025 COMPLETE CBC W/AUTO DIFF WBC: CPT

## 2024-12-23 PROCEDURE — 84484 ASSAY OF TROPONIN QUANT: CPT

## 2024-12-23 RX ORDER — 0.9 % SODIUM CHLORIDE 0.9 %
30 INTRAVENOUS SOLUTION INTRAVENOUS ONCE
Status: COMPLETED | OUTPATIENT
Start: 2024-12-23 | End: 2024-12-23

## 2024-12-23 RX ORDER — IOPAMIDOL 755 MG/ML
100 INJECTION, SOLUTION INTRAVASCULAR
Status: COMPLETED | OUTPATIENT
Start: 2024-12-23 | End: 2024-12-23

## 2024-12-23 RX ADMIN — IOPAMIDOL 100 ML: 755 INJECTION, SOLUTION INTRAVENOUS at 19:46

## 2024-12-23 RX ADMIN — WATER 1000 MG: 1 INJECTION INTRAMUSCULAR; INTRAVENOUS; SUBCUTANEOUS at 20:58

## 2024-12-23 RX ADMIN — SODIUM CHLORIDE 1677 ML: 9 INJECTION, SOLUTION INTRAVENOUS at 18:56

## 2024-12-23 ASSESSMENT — LIFESTYLE VARIABLES
HOW MANY STANDARD DRINKS CONTAINING ALCOHOL DO YOU HAVE ON A TYPICAL DAY: PATIENT DOES NOT DRINK
HOW OFTEN DO YOU HAVE A DRINK CONTAINING ALCOHOL: NEVER

## 2024-12-23 ASSESSMENT — PAIN - FUNCTIONAL ASSESSMENT: PAIN_FUNCTIONAL_ASSESSMENT: NONE - DENIES PAIN

## 2024-12-23 NOTE — ED PROVIDER NOTES
given that her lymphocyte percentage is higher than normal, it is possible this is a recurrence of CLL.  He asked me to run this by hematology.  I spoke to Dr. Santos, hematology.  He said that since there is no source of infection, the patient is not anemic and her platelets are normal, she should follow-up with them.  She can go through her primary care doctor to get set up  an appointment with Dr. Santos    Risk  Prescription drug management.      [facility] ED SEPSIS NOTE:     6:31 PM EST The patient now meets criteria for: Severe Sepsis    Fluid resuscitation with: 30 mL/kg crystalloid bolus  Due to concern for rapidly advancing infection and deterioration of patient's condition, antibiotics are started STAT and cultures ordered.            FINAL IMPRESSION     1. Leukocytosis, unspecified type          DISPOSITION/PLAN   Ary Turner's  results have been reviewed with her.  She has been counseled regarding her diagnosis, treatment, and plan.  She verbally conveys understanding and agreement of the signs, symptoms, diagnosis, treatment and prognosis and additionally agrees to follow up as discussed.  She also agrees with the care-plan and conveys that all of her questions have been answered.  I have also provided discharge instructions for her that include: educational information regarding their diagnosis and treatment, and list of reasons why they would want to return to the ED prior to their follow-up appointment, should her condition change.     CLINICAL IMPRESSION    Discharge Note: The patient is stable for discharge home. The signs, symptoms, diagnosis, and discharge instructions have been discussed, understanding conveyed, and agreed upon. The patient is to follow up as recommended or return to ER should their symptoms worsen.      PATIENT REFERRED TO:  Andreea Johnson MD  [address]   [facility] [address] [phone]    Call in 1 day  To get an appointment with Dr. Oral Santos,

## 2024-12-26 LAB
EKG ATRIAL RATE: 73 BPM
EKG DIAGNOSIS: NORMAL
EKG P AXIS: 23 DEGREES
EKG P-R INTERVAL: 202 MS
EKG Q-T INTERVAL: 398 MS
EKG QRS DURATION: 82 MS
EKG QTC CALCULATION (BAZETT): 438 MS
EKG R AXIS: -18 DEGREES
EKG T AXIS: 44 DEGREES
EKG VENTRICULAR RATE: 73 BPM

## 2024-12-28 LAB
BACTERIA SPEC CULT: NORMAL
BACTERIA SPEC CULT: NORMAL
SERVICE CMNT-IMP: NORMAL
SERVICE CMNT-IMP: NORMAL

## 2025-07-25 ENCOUNTER — TRANSCRIBE ORDERS (OUTPATIENT)
Facility: HOSPITAL | Age: 85
End: 2025-07-25

## 2025-07-25 DIAGNOSIS — C91.10 LEUKEMIA, LYMPHOCYTIC, CHRONIC (HCC): Primary | ICD-10-CM

## 2025-08-05 ENCOUNTER — HOSPITAL ENCOUNTER (EMERGENCY)
Facility: HOSPITAL | Age: 85
Discharge: HOME | End: 2025-08-05
Attending: EMERGENCY MEDICINE
Payer: MEDICARE

## 2025-08-05 ENCOUNTER — APPOINTMENT (OUTPATIENT)
Facility: HOSPITAL | Age: 85
End: 2025-08-05
Payer: MEDICARE

## 2025-08-05 VITALS
HEIGHT: 60 IN | OXYGEN SATURATION: 95 % | DIASTOLIC BLOOD PRESSURE: 80 MMHG | TEMPERATURE: 97.7 F | RESPIRATION RATE: 14 BRPM | WEIGHT: 121.25 LBS | HEART RATE: 69 BPM | SYSTOLIC BLOOD PRESSURE: 162 MMHG | BODY MASS INDEX: 23.81 KG/M2

## 2025-08-05 DIAGNOSIS — R59.1 LYMPHADENOPATHY: ICD-10-CM

## 2025-08-05 DIAGNOSIS — R53.83 OTHER FATIGUE: ICD-10-CM

## 2025-08-05 DIAGNOSIS — D72.829 LEUKOCYTOSIS, UNSPECIFIED TYPE: Primary | ICD-10-CM

## 2025-08-05 LAB
ALBUMIN SERPL-MCNC: 3.9 G/DL (ref 3.5–5.2)
ALBUMIN/GLOB SERPL: 1.4 (ref 1.1–2.2)
ALP SERPL-CCNC: 85 U/L (ref 35–104)
ALT SERPL-CCNC: 12 U/L (ref 10–35)
ANION GAP SERPL CALC-SCNC: 12 MMOL/L (ref 2–14)
APPEARANCE UR: CLEAR
AST SERPL-CCNC: 20 U/L (ref 10–35)
BACTERIA URNS QL MICRO: NEGATIVE /HPF
BASOPHILS # BLD: 0 K/UL (ref 0–0.1)
BASOPHILS NFR BLD: 0 % (ref 0–1)
BILIRUB SERPL-MCNC: 0.5 MG/DL (ref 0–1.2)
BILIRUB UR QL: NEGATIVE
BUN SERPL-MCNC: 9 MG/DL (ref 8–23)
BUN/CREAT SERPL: 10 (ref 12–20)
CALCIUM SERPL-MCNC: 10.1 MG/DL (ref 8.8–10.2)
CHLORIDE SERPL-SCNC: 99 MMOL/L (ref 98–107)
CO2 SERPL-SCNC: 24 MMOL/L (ref 20–29)
COLOR UR: ABNORMAL
CREAT SERPL-MCNC: 0.94 MG/DL (ref 0.6–1)
DIFFERENTIAL METHOD BLD: ABNORMAL
EOSINOPHIL # BLD: 0.58 K/UL (ref 0–0.4)
EOSINOPHIL NFR BLD: 2 % (ref 0–7)
EPITH CASTS URNS QL MICRO: ABNORMAL /LPF
ERYTHROCYTE [DISTWIDTH] IN BLOOD BY AUTOMATED COUNT: 14 % (ref 11.5–14.5)
FLUAV RNA SPEC QL NAA+PROBE: NOT DETECTED
FLUBV RNA SPEC QL NAA+PROBE: NOT DETECTED
GLOBULIN SER CALC-MCNC: 2.7 G/DL (ref 2–4)
GLUCOSE BLD STRIP.AUTO-MCNC: 109 MG/DL (ref 65–117)
GLUCOSE SERPL-MCNC: 134 MG/DL (ref 65–100)
GLUCOSE UR STRIP.AUTO-MCNC: NEGATIVE MG/DL
HCT VFR BLD AUTO: 40.5 % (ref 35–47)
HGB BLD-MCNC: 13.5 G/DL (ref 11.5–16)
HGB UR QL STRIP: ABNORMAL
HYALINE CASTS URNS QL MICRO: ABNORMAL /LPF (ref 0–2)
IMM GRANULOCYTES # BLD AUTO: 0 K/UL (ref 0–0.04)
IMM GRANULOCYTES NFR BLD AUTO: 0 % (ref 0–0.5)
KETONES UR QL STRIP.AUTO: NEGATIVE MG/DL
LEUKOCYTE ESTERASE UR QL STRIP.AUTO: NEGATIVE
LYMPHOCYTES # BLD: 23.78 K/UL (ref 0.8–3.5)
LYMPHOCYTES NFR BLD: 82 % (ref 12–49)
MCH RBC QN AUTO: 30.1 PG (ref 26–34)
MCHC RBC AUTO-ENTMCNC: 33.3 G/DL (ref 30–36.5)
MCV RBC AUTO: 90.2 FL (ref 80–99)
MONOCYTES # BLD: 0.58 K/UL (ref 0–1)
MONOCYTES NFR BLD: 2 % (ref 5–13)
NEUTS SEG # BLD: 4.06 K/UL (ref 1.8–8)
NEUTS SEG NFR BLD: 14 % (ref 32–75)
NITRITE UR QL STRIP.AUTO: NEGATIVE
NRBC # BLD: 0 K/UL (ref 0–0.01)
NRBC BLD-RTO: 0 PER 100 WBC
PATH REV BLD -IMP: ABNORMAL
PH UR STRIP: 7 (ref 5–8)
PLATELET # BLD AUTO: 154 K/UL (ref 150–400)
PMV BLD AUTO: 10.9 FL (ref 8.9–12.9)
POTASSIUM SERPL-SCNC: 4.6 MMOL/L (ref 3.5–5.1)
PROT SERPL-MCNC: 6.7 G/DL (ref 6.4–8.3)
PROT UR STRIP-MCNC: NEGATIVE MG/DL
RBC # BLD AUTO: 4.49 M/UL (ref 3.8–5.2)
RBC #/AREA URNS HPF: ABNORMAL /HPF (ref 0–5)
RBC MORPH BLD: ABNORMAL
SARS-COV-2 RNA RESP QL NAA+PROBE: NOT DETECTED
SERVICE CMNT-IMP: NORMAL
SODIUM SERPL-SCNC: 135 MMOL/L (ref 136–145)
SOURCE: NORMAL
SP GR UR REFRACTOMETRY: 1
TROPONIN T SERPL HS-MCNC: 9.7 NG/L (ref 0–14)
URINE CULTURE IF INDICATED: ABNORMAL
UROBILINOGEN UR QL STRIP.AUTO: 1 EU/DL (ref 0.2–1)
WBC # BLD AUTO: 29 K/UL (ref 3.6–11)
WBC MORPH BLD: ABNORMAL
WBC URNS QL MICRO: ABNORMAL /HPF (ref 0–4)

## 2025-08-05 PROCEDURE — 82962 GLUCOSE BLOOD TEST: CPT

## 2025-08-05 PROCEDURE — 71260 CT THORAX DX C+: CPT

## 2025-08-05 PROCEDURE — 36415 COLL VENOUS BLD VENIPUNCTURE: CPT

## 2025-08-05 PROCEDURE — 6360000004 HC RX CONTRAST MEDICATION: Performed by: EMERGENCY MEDICINE

## 2025-08-05 PROCEDURE — 85025 COMPLETE CBC W/AUTO DIFF WBC: CPT

## 2025-08-05 PROCEDURE — 99285 EMERGENCY DEPT VISIT HI MDM: CPT

## 2025-08-05 PROCEDURE — 93005 ELECTROCARDIOGRAM TRACING: CPT | Performed by: EMERGENCY MEDICINE

## 2025-08-05 PROCEDURE — 70450 CT HEAD/BRAIN W/O DYE: CPT

## 2025-08-05 PROCEDURE — 81001 URINALYSIS AUTO W/SCOPE: CPT

## 2025-08-05 PROCEDURE — 80053 COMPREHEN METABOLIC PANEL: CPT

## 2025-08-05 PROCEDURE — 87086 URINE CULTURE/COLONY COUNT: CPT

## 2025-08-05 PROCEDURE — 84484 ASSAY OF TROPONIN QUANT: CPT

## 2025-08-05 PROCEDURE — 87636 SARSCOV2 & INF A&B AMP PRB: CPT

## 2025-08-05 RX ORDER — IOPAMIDOL 755 MG/ML
100 INJECTION, SOLUTION INTRAVASCULAR
Status: COMPLETED | OUTPATIENT
Start: 2025-08-05 | End: 2025-08-05

## 2025-08-05 RX ADMIN — IOPAMIDOL 100 ML: 755 INJECTION, SOLUTION INTRAVENOUS at 18:47

## 2025-08-05 ASSESSMENT — PAIN - FUNCTIONAL ASSESSMENT: PAIN_FUNCTIONAL_ASSESSMENT: NONE - DENIES PAIN

## 2025-08-06 LAB
BACTERIA SPEC CULT: NORMAL
PATH REV BLD -IMP: NORMAL
SERVICE CMNT-IMP: NORMAL

## 2025-08-08 LAB
EKG ATRIAL RATE: 64 BPM
EKG DIAGNOSIS: NORMAL
EKG P AXIS: 4 DEGREES
EKG P-R INTERVAL: 202 MS
EKG Q-T INTERVAL: 410 MS
EKG QRS DURATION: 70 MS
EKG QTC CALCULATION (BAZETT): 422 MS
EKG R AXIS: 44 DEGREES
EKG T AXIS: -3 DEGREES
EKG VENTRICULAR RATE: 64 BPM

## (undated) DEVICE — 3000CC GUARDIAN II: Brand: GUARDIAN

## (undated) DEVICE — SURGICAL PROCEDURE KIT GEN LAPAROSCOPY LF

## (undated) DEVICE — ARM DRAPE

## (undated) DEVICE — EVAC SMOKE SEECLEAR XCL -- SEE CLEAR

## (undated) DEVICE — DEVON™ KNEE AND BODY STRAP 60" X 3" (1.5 M X 7.6 CM): Brand: DEVON

## (undated) DEVICE — DILATOR AND CANNULA WITH RADIALLY EXPANDABLE SLEEVE: Brand: VERSASTEP PLUS

## (undated) DEVICE — STERILE POLYISOPRENE POWDER-FREE SURGICAL GLOVES WITH EMOLLIENT COATING: Brand: PROTEXIS

## (undated) DEVICE — BLADELESS OPTICAL TROCAR WITH FIXATION CANNULA: Brand: VERSAPORT

## (undated) DEVICE — VESSEL SEALER: Brand: ENDOWRIST

## (undated) DEVICE — LARGE NEEDLE DRIVER: Brand: ENDOWRIST

## (undated) DEVICE — VISUALIZATION SYSTEM: Brand: CLEARIFY

## (undated) DEVICE — SUTURE MCRYL SZ 4-0 L27IN ABSRB UD L19MM PS-2 1/2 CIR PRIM Y426H

## (undated) DEVICE — MAGNETIC IMPLANT S1000-00: Brand: SOPHONO™

## (undated) DEVICE — COLUMN DRAPE

## (undated) DEVICE — APPLICATOR BNDG 1MM ADH PREMIERPRO EXOFIN

## (undated) DEVICE — TOWEL SURG W17XL27IN STD BLU COT NONFENESTRATED PREWASHED

## (undated) DEVICE — (D)PREP SKN CHLRAPRP APPL 26ML -- CONVERT TO ITEM 371833

## (undated) DEVICE — FENESTRATED BIPOLAR FORCEPS: Brand: ENDOWRIST

## (undated) DEVICE — HANDLE LT SNAP ON ULT DURABLE LENS FOR TRUMPF ALC DISPOSABLE

## (undated) DEVICE — SEAL UNIV 5-8MM DISP BX/10 -- DA VINCI XI - SNGL USE

## (undated) DEVICE — SUTURE ETHIB EXCL BR GRN TAPR PT 2-0 30 X563H X563H

## (undated) DEVICE — BASIN EMSIS 16OZ GRAPHITE PLAS KID SHP MOLD GRAD FOR ORAL

## (undated) DEVICE — CUFF BLD PRSS AD 25-34CM 1 TB W/ M BAYNT CONN REUSE

## (undated) DEVICE — SYRINGE 50ML E/T

## (undated) DEVICE — INFECTION CONTROL KIT SYS

## (undated) DEVICE — SUT SLK 3-0 30IN SH BLK --

## (undated) DEVICE — SOLUTION IRRIG 1000ML H2O STRL BLT

## (undated) DEVICE — SPONGE HEMOSTAT CELLULS 4X8IN -- SURGICEL

## (undated) DEVICE — CADIERE FORCEPS: Brand: ENDOWRIST

## (undated) DEVICE — STERILE POLYISOPRENE POWDER-FREE SURGICAL GLOVES: Brand: PROTEXIS

## (undated) DEVICE — NEEDLE HYPO 25GA L1.5IN BVL ORIENTED ECLIPSE

## (undated) DEVICE — KENDALL SCD EXPRESS SLEEVES, KNEE LENGTH, MEDIUM: Brand: KENDALL SCD

## (undated) DEVICE — REM POLYHESIVE ADULT PATIENT RETURN ELECTRODE: Brand: VALLEYLAB

## (undated) DEVICE — BLADELESS OBTURATOR: Brand: WECK VISTA